# Patient Record
Sex: MALE | Race: WHITE
[De-identification: names, ages, dates, MRNs, and addresses within clinical notes are randomized per-mention and may not be internally consistent; named-entity substitution may affect disease eponyms.]

---

## 2020-01-31 ENCOUNTER — HOSPITAL ENCOUNTER (EMERGENCY)
Dept: HOSPITAL 8 - ED | Age: 30
Discharge: HOME | End: 2020-01-31
Payer: MEDICAID

## 2020-01-31 VITALS — HEIGHT: 68 IN | BODY MASS INDEX: 22.39 KG/M2 | WEIGHT: 147.71 LBS

## 2020-01-31 VITALS — DIASTOLIC BLOOD PRESSURE: 82 MMHG | SYSTOLIC BLOOD PRESSURE: 125 MMHG

## 2020-01-31 DIAGNOSIS — B34.9: Primary | ICD-10-CM

## 2020-01-31 PROCEDURE — 99283 EMERGENCY DEPT VISIT LOW MDM: CPT

## 2020-01-31 PROCEDURE — 71046 X-RAY EXAM CHEST 2 VIEWS: CPT

## 2020-01-31 NOTE — NUR
PATIENT ARRIVES WITH A COUGH THAT BEGAN ON SUNDAY.  HE STATES HE WORKS AT [employer] AND IT'S IMPEDING HIS WORK.

## 2020-01-31 NOTE — NUR
break RN: Patient given discharge instructions and they have confirmed that 
they understand the instructions.  Patient ambulatory with steady gait.

## 2024-05-23 ENCOUNTER — APPOINTMENT (OUTPATIENT)
Dept: RADIOLOGY | Facility: MEDICAL CENTER | Age: 34
DRG: 464 | End: 2024-05-23
Attending: EMERGENCY MEDICINE
Payer: OTHER MISCELLANEOUS

## 2024-05-23 ENCOUNTER — APPOINTMENT (OUTPATIENT)
Dept: RADIOLOGY | Facility: MEDICAL CENTER | Age: 34
DRG: 464 | End: 2024-05-23
Payer: OTHER MISCELLANEOUS

## 2024-05-23 ENCOUNTER — APPOINTMENT (OUTPATIENT)
Dept: RADIOLOGY | Facility: MEDICAL CENTER | Age: 34
DRG: 464 | End: 2024-05-23
Attending: ORTHOPAEDIC SURGERY
Payer: OTHER MISCELLANEOUS

## 2024-05-23 ENCOUNTER — HOSPITAL ENCOUNTER (INPATIENT)
Facility: MEDICAL CENTER | Age: 34
End: 2024-05-23
Attending: EMERGENCY MEDICINE | Admitting: SURGERY
Payer: OTHER MISCELLANEOUS

## 2024-05-23 DIAGNOSIS — S42.101A CLOSED FRACTURE OF RIGHT SCAPULA, UNSPECIFIED PART OF SCAPULA, INITIAL ENCOUNTER: ICD-10-CM

## 2024-05-23 DIAGNOSIS — F11.10 OPIOID ABUSE (HCC): ICD-10-CM

## 2024-05-23 DIAGNOSIS — S82.892A CLOSED FRACTURE OF LEFT ANKLE, INITIAL ENCOUNTER: ICD-10-CM

## 2024-05-23 DIAGNOSIS — S82.891A CLOSED FRACTURE OF RIGHT ANKLE, INITIAL ENCOUNTER: ICD-10-CM

## 2024-05-23 DIAGNOSIS — T14.8XXA MULTIPLE SKIN TEARS: ICD-10-CM

## 2024-05-23 DIAGNOSIS — S32.10XA CLOSED FRACTURE OF SACRUM, UNSPECIFIED PORTION OF SACRUM, INITIAL ENCOUNTER (HCC): ICD-10-CM
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
## 2024-05-23 DIAGNOSIS — T14.90XA TRAUMA: ICD-10-CM

## 2024-05-23 DIAGNOSIS — S62.309A MULTIPLE CLOSED FRACTURES OF SINGLE METACARPAL BONE, INITIAL ENCOUNTER: ICD-10-CM

## 2024-05-23 DIAGNOSIS — E87.6 HYPOKALEMIA: ICD-10-CM

## 2024-05-23 DIAGNOSIS — S92.902A CLOSED FRACTURE OF LEFT FOOT, INITIAL ENCOUNTER: ICD-10-CM

## 2024-05-23 DIAGNOSIS — S41.111A LACERATION OF RIGHT UPPER EXTREMITY, INITIAL ENCOUNTER: ICD-10-CM

## 2024-05-23 DIAGNOSIS — S81.812A LACERATION OF LEFT LOWER EXTREMITY, INITIAL ENCOUNTER: ICD-10-CM

## 2024-05-23 DIAGNOSIS — S22.32XA CLOSED FRACTURE OF ONE RIB OF LEFT SIDE, INITIAL ENCOUNTER: ICD-10-CM

## 2024-05-23 PROBLEM — S42.102A CLOSED FRACTURE OF LEFT SCAPULA: Status: ACTIVE | Noted: 2024-05-23

## 2024-05-23 PROBLEM — S92.901A: Status: ACTIVE | Noted: 2024-05-23

## 2024-05-23 PROBLEM — Z78.9 NO CONTRAINDICATION TO DEEP VEIN THROMBOSIS (DVT) PROPHYLAXIS: Status: ACTIVE | Noted: 2024-05-23

## 2024-05-23 PROBLEM — T07.XXXA MULTIPLE LACERATIONS: Status: ACTIVE | Noted: 2024-05-23

## 2024-05-23 PROBLEM — S22.060A COMPRESSION FRACTURE OF T8 VERTEBRA (HCC): Status: ACTIVE | Noted: 2024-05-23

## 2024-05-23 LAB
ABO + RH BLD: NORMAL
ABO + RH BLD: NORMAL
ABO GROUP BLD: NORMAL
ABO GROUP BLD: NORMAL
ALBUMIN SERPL BCP-MCNC: 4.3 G/DL (ref 3.2–4.9)
ALBUMIN SERPL BCP-MCNC: 4.3 G/DL (ref 3.2–4.9)
ALBUMIN/GLOB SERPL: 1.3 G/DL
ALBUMIN/GLOB SERPL: 1.3 G/DL
ALP SERPL-CCNC: 56 U/L (ref 30–99)
ALP SERPL-CCNC: 56 U/L (ref 30–99)
ALT SERPL-CCNC: 73 U/L (ref 2–50)
ALT SERPL-CCNC: 73 U/L (ref 2–50)
ANION GAP SERPL CALC-SCNC: 17 MMOL/L (ref 7–16)
ANION GAP SERPL CALC-SCNC: 17 MMOL/L (ref 7–16)
APTT PPP: 25.7 SEC (ref 24.7–36)
APTT PPP: 25.7 SEC (ref 24.7–36)
AST SERPL-CCNC: 84 U/L (ref 12–45)
AST SERPL-CCNC: 84 U/L (ref 12–45)
BILIRUB SERPL-MCNC: 0.5 MG/DL (ref 0.1–1.5)
BILIRUB SERPL-MCNC: 0.5 MG/DL (ref 0.1–1.5)
BLD GP AB SCN SERPL QL: NORMAL
BLD GP AB SCN SERPL QL: NORMAL
BUN SERPL-MCNC: 15 MG/DL (ref 8–22)
BUN SERPL-MCNC: 15 MG/DL (ref 8–22)
CALCIUM ALBUM COR SERPL-MCNC: 9 MG/DL (ref 8.5–10.5)
CALCIUM ALBUM COR SERPL-MCNC: 9 MG/DL (ref 8.5–10.5)
CALCIUM SERPL-MCNC: 9.2 MG/DL (ref 8.5–10.5)
CALCIUM SERPL-MCNC: 9.2 MG/DL (ref 8.5–10.5)
CHLORIDE SERPL-SCNC: 100 MMOL/L (ref 96–112)
CHLORIDE SERPL-SCNC: 100 MMOL/L (ref 96–112)
CO2 SERPL-SCNC: 20 MMOL/L (ref 20–33)
CO2 SERPL-SCNC: 20 MMOL/L (ref 20–33)
CREAT SERPL-MCNC: 1.08 MG/DL (ref 0.5–1.4)
CREAT SERPL-MCNC: 1.08 MG/DL (ref 0.5–1.4)
ERYTHROCYTE [DISTWIDTH] IN BLOOD BY AUTOMATED COUNT: 38.8 FL (ref 35.9–50)
ERYTHROCYTE [DISTWIDTH] IN BLOOD BY AUTOMATED COUNT: 38.8 FL (ref 35.9–50)
ETHANOL BLD-MCNC: <10.1 MG/DL
ETHANOL BLD-MCNC: <10.1 MG/DL
GFR SERPLBLD CREATININE-BSD FMLA CKD-EPI: 93 ML/MIN/1.73 M 2
GFR SERPLBLD CREATININE-BSD FMLA CKD-EPI: 93 ML/MIN/1.73 M 2
GLOBULIN SER CALC-MCNC: 3.4 G/DL (ref 1.9–3.5)
GLOBULIN SER CALC-MCNC: 3.4 G/DL (ref 1.9–3.5)
GLUCOSE BLD STRIP.AUTO-MCNC: 121 MG/DL (ref 65–99)
GLUCOSE BLD STRIP.AUTO-MCNC: 121 MG/DL (ref 65–99)
GLUCOSE SERPL-MCNC: 164 MG/DL (ref 65–99)
GLUCOSE SERPL-MCNC: 164 MG/DL (ref 65–99)
HCT VFR BLD AUTO: 48.4 % (ref 42–52)
HCT VFR BLD AUTO: 48.4 % (ref 42–52)
HGB BLD-MCNC: 16.5 G/DL (ref 14–18)
HGB BLD-MCNC: 16.5 G/DL (ref 14–18)
INR PPP: 0.98 (ref 0.87–1.13)
INR PPP: 0.98 (ref 0.87–1.13)
MCH RBC QN AUTO: 28.7 PG (ref 27–33)
MCH RBC QN AUTO: 28.7 PG (ref 27–33)
MCHC RBC AUTO-ENTMCNC: 34.1 G/DL (ref 32.3–36.5)
MCHC RBC AUTO-ENTMCNC: 34.1 G/DL (ref 32.3–36.5)
MCV RBC AUTO: 84.3 FL (ref 81.4–97.8)
MCV RBC AUTO: 84.3 FL (ref 81.4–97.8)
PLATELET # BLD AUTO: 344 K/UL (ref 164–446)
PLATELET # BLD AUTO: 344 K/UL (ref 164–446)
PMV BLD AUTO: 10 FL (ref 9–12.9)
PMV BLD AUTO: 10 FL (ref 9–12.9)
POTASSIUM SERPL-SCNC: 3 MMOL/L (ref 3.6–5.5)
POTASSIUM SERPL-SCNC: 3 MMOL/L (ref 3.6–5.5)
PROT SERPL-MCNC: 7.7 G/DL (ref 6–8.2)
PROT SERPL-MCNC: 7.7 G/DL (ref 6–8.2)
PROTHROMBIN TIME: 13.1 SEC (ref 12–14.6)
PROTHROMBIN TIME: 13.1 SEC (ref 12–14.6)
RBC # BLD AUTO: 5.74 M/UL (ref 4.7–6.1)
RBC # BLD AUTO: 5.74 M/UL (ref 4.7–6.1)
RH BLD: NORMAL
RH BLD: NORMAL
SODIUM SERPL-SCNC: 137 MMOL/L (ref 135–145)
SODIUM SERPL-SCNC: 137 MMOL/L (ref 135–145)
WBC # BLD AUTO: 14.9 K/UL (ref 4.8–10.8)
WBC # BLD AUTO: 14.9 K/UL (ref 4.8–10.8)

## 2024-05-23 PROCEDURE — 86850 RBC ANTIBODY SCREEN: CPT

## 2024-05-23 PROCEDURE — 99223 1ST HOSP IP/OBS HIGH 75: CPT | Performed by: SURGERY

## 2024-05-23 PROCEDURE — 0QSKXZZ REPOSITION LEFT FIBULA, EXTERNAL APPROACH: ICD-10-PCS | Performed by: EMERGENCY MEDICINE

## 2024-05-23 PROCEDURE — 73600 X-RAY EXAM OF ANKLE: CPT | Mod: LT

## 2024-05-23 PROCEDURE — 85027 COMPLETE CBC AUTOMATED: CPT

## 2024-05-23 PROCEDURE — 73620 X-RAY EXAM OF FOOT: CPT | Mod: LT

## 2024-05-23 PROCEDURE — 73700 CT LOWER EXTREMITY W/O DYE: CPT | Mod: LT

## 2024-05-23 PROCEDURE — 304217 HCHG IRRIGATION SYSTEM

## 2024-05-23 PROCEDURE — 700102 HCHG RX REV CODE 250 W/ 637 OVERRIDE(OP)

## 2024-05-23 PROCEDURE — 73130 X-RAY EXAM OF HAND: CPT | Mod: LT

## 2024-05-23 PROCEDURE — 85610 PROTHROMBIN TIME: CPT

## 2024-05-23 PROCEDURE — 73030 X-RAY EXAM OF SHOULDER: CPT | Mod: RT

## 2024-05-23 PROCEDURE — 770001 HCHG ROOM/CARE - MED/SURG/GYN PRIV*

## 2024-05-23 PROCEDURE — 302874 HCHG BANDAGE ACE 2 OR 3""

## 2024-05-23 PROCEDURE — 72128 CT CHEST SPINE W/O DYE: CPT

## 2024-05-23 PROCEDURE — 0QSHXZZ REPOSITION LEFT TIBIA, EXTERNAL APPROACH: ICD-10-PCS | Performed by: EMERGENCY MEDICINE

## 2024-05-23 PROCEDURE — 27810 TREATMENT OF ANKLE FRACTURE: CPT

## 2024-05-23 PROCEDURE — 73610 X-RAY EXAM OF ANKLE: CPT | Mod: RT

## 2024-05-23 PROCEDURE — 72125 CT NECK SPINE W/O DYE: CPT

## 2024-05-23 PROCEDURE — 29125 APPL SHORT ARM SPLINT STATIC: CPT

## 2024-05-23 PROCEDURE — 700111 HCHG RX REV CODE 636 W/ 250 OVERRIDE (IP): Mod: JZ | Performed by: PHYSICIAN ASSISTANT

## 2024-05-23 PROCEDURE — 73700 CT LOWER EXTREMITY W/O DYE: CPT | Mod: RT

## 2024-05-23 PROCEDURE — 70450 CT HEAD/BRAIN W/O DYE: CPT

## 2024-05-23 PROCEDURE — 305948 HCHG GREEN TRAUMA ACT PRE-NOTIFY NO CC

## 2024-05-23 PROCEDURE — 73590 X-RAY EXAM OF LOWER LEG: CPT | Mod: LT

## 2024-05-23 PROCEDURE — 73590 X-RAY EXAM OF LOWER LEG: CPT | Mod: RT

## 2024-05-23 PROCEDURE — 36415 COLL VENOUS BLD VENIPUNCTURE: CPT

## 2024-05-23 PROCEDURE — 82077 ASSAY SPEC XCP UR&BREATH IA: CPT

## 2024-05-23 PROCEDURE — 72131 CT LUMBAR SPINE W/O DYE: CPT

## 2024-05-23 PROCEDURE — 700105 HCHG RX REV CODE 258

## 2024-05-23 PROCEDURE — 71260 CT THORAX DX C+: CPT

## 2024-05-23 PROCEDURE — 96365 THER/PROPH/DIAG IV INF INIT: CPT

## 2024-05-23 PROCEDURE — 700105 HCHG RX REV CODE 258: Performed by: EMERGENCY MEDICINE

## 2024-05-23 PROCEDURE — 80053 COMPREHEN METABOLIC PANEL: CPT

## 2024-05-23 PROCEDURE — A9270 NON-COVERED ITEM OR SERVICE: HCPCS

## 2024-05-23 PROCEDURE — 90471 IMMUNIZATION ADMIN: CPT

## 2024-05-23 PROCEDURE — 90715 TDAP VACCINE 7 YRS/> IM: CPT | Performed by: EMERGENCY MEDICINE

## 2024-05-23 PROCEDURE — 72170 X-RAY EXAM OF PELVIS: CPT

## 2024-05-23 PROCEDURE — 700111 HCHG RX REV CODE 636 W/ 250 OVERRIDE (IP): Performed by: EMERGENCY MEDICINE

## 2024-05-23 PROCEDURE — 700101 HCHG RX REV CODE 250: Mod: UD | Performed by: EMERGENCY MEDICINE

## 2024-05-23 PROCEDURE — 304999 HCHG REPAIR-SIMPLE/INTERMED LEVEL 1

## 2024-05-23 PROCEDURE — 86900 BLOOD TYPING SEROLOGIC ABO: CPT

## 2024-05-23 PROCEDURE — 94799 UNLISTED PULMONARY SVC/PX: CPT

## 2024-05-23 PROCEDURE — 303747 HCHG EXTRA SUTURE

## 2024-05-23 PROCEDURE — 700101 HCHG RX REV CODE 250

## 2024-05-23 PROCEDURE — 700117 HCHG RX CONTRAST REV CODE 255: Performed by: EMERGENCY MEDICINE

## 2024-05-23 PROCEDURE — 3E0234Z INTRODUCTION OF SERUM, TOXOID AND VACCINE INTO MUSCLE, PERCUTANEOUS APPROACH: ICD-10-PCS | Performed by: EMERGENCY MEDICINE

## 2024-05-23 PROCEDURE — 86901 BLOOD TYPING SEROLOGIC RH(D): CPT

## 2024-05-23 PROCEDURE — 71045 X-RAY EXAM CHEST 1 VIEW: CPT

## 2024-05-23 PROCEDURE — 0HQDXZZ REPAIR RIGHT LOWER ARM SKIN, EXTERNAL APPROACH: ICD-10-PCS | Performed by: EMERGENCY MEDICINE

## 2024-05-23 PROCEDURE — 99285 EMERGENCY DEPT VISIT HI MDM: CPT

## 2024-05-23 PROCEDURE — 96375 TX/PRO/DX INJ NEW DRUG ADDON: CPT

## 2024-05-23 PROCEDURE — 82962 GLUCOSE BLOOD TEST: CPT

## 2024-05-23 PROCEDURE — 85730 THROMBOPLASTIN TIME PARTIAL: CPT

## 2024-05-23 RX ORDER — ACETAMINOPHEN 500 MG
1000 TABLET ORAL EVERY 6 HOURS PRN
Status: DISCONTINUED | OUTPATIENT
Start: 2024-05-28 | End: 2024-06-26 | Stop reason: HOSPADM

## 2024-05-23 RX ORDER — ONDANSETRON 2 MG/ML
4 INJECTION INTRAMUSCULAR; INTRAVENOUS EVERY 4 HOURS PRN
Status: DISCONTINUED | OUTPATIENT
Start: 2024-05-23 | End: 2024-06-19

## 2024-05-23 RX ORDER — METAXALONE 800 MG/1
800 TABLET ORAL 3 TIMES DAILY
Status: DISCONTINUED | OUTPATIENT
Start: 2024-05-23 | End: 2024-06-26 | Stop reason: HOSPADM

## 2024-05-23 RX ORDER — DOCUSATE SODIUM 100 MG/1
100 CAPSULE, LIQUID FILLED ORAL 2 TIMES DAILY
Status: DISCONTINUED | OUTPATIENT
Start: 2024-05-23 | End: 2024-06-26 | Stop reason: HOSPADM

## 2024-05-23 RX ORDER — BISACODYL 10 MG
10 SUPPOSITORY, RECTAL RECTAL
Status: DISCONTINUED | OUTPATIENT
Start: 2024-05-23 | End: 2024-06-26 | Stop reason: HOSPADM

## 2024-05-23 RX ORDER — DEXTROSE MONOHYDRATE 25 G/50ML
25 INJECTION, SOLUTION INTRAVENOUS
Status: DISCONTINUED | OUTPATIENT
Start: 2024-05-23 | End: 2024-05-24

## 2024-05-23 RX ORDER — AMOXICILLIN 250 MG
1 CAPSULE ORAL NIGHTLY
Status: DISCONTINUED | OUTPATIENT
Start: 2024-05-23 | End: 2024-06-26 | Stop reason: HOSPADM

## 2024-05-23 RX ORDER — LIDOCAINE 4 G/G
1 PATCH TOPICAL EVERY 24 HOURS
Status: DISCONTINUED | OUTPATIENT
Start: 2024-05-23 | End: 2024-05-26

## 2024-05-23 RX ORDER — BACITRACIN ZINC AND POLYMYXIN B SULFATE 500; 1000 [USP'U]/G; [USP'U]/G
OINTMENT TOPICAL 3 TIMES DAILY
Status: DISPENSED | OUTPATIENT
Start: 2024-05-23 | End: 2024-05-30

## 2024-05-23 RX ORDER — IBUPROFEN 200 MG
800-1000 TABLET ORAL
Status: ON HOLD | COMMUNITY
End: 2024-06-12

## 2024-05-23 RX ORDER — MIDAZOLAM HYDROCHLORIDE 1 MG/ML
INJECTION INTRAMUSCULAR; INTRAVENOUS
Status: COMPLETED | OUTPATIENT
Start: 2024-05-23 | End: 2024-05-23

## 2024-05-23 RX ORDER — ACETAMINOPHEN 500 MG
1000 TABLET ORAL EVERY 6 HOURS
Status: DISPENSED | OUTPATIENT
Start: 2024-05-23 | End: 2024-05-28

## 2024-05-23 RX ORDER — CELECOXIB 200 MG/1
200 CAPSULE ORAL 2 TIMES DAILY PRN
Status: DISCONTINUED | OUTPATIENT
Start: 2024-05-28 | End: 2024-05-31

## 2024-05-23 RX ORDER — AMOXICILLIN 250 MG
1 CAPSULE ORAL
Status: DISCONTINUED | OUTPATIENT
Start: 2024-05-23 | End: 2024-06-26 | Stop reason: HOSPADM

## 2024-05-23 RX ORDER — OXYCODONE HYDROCHLORIDE 5 MG/1
5 TABLET ORAL
Status: DISCONTINUED | OUTPATIENT
Start: 2024-05-23 | End: 2024-05-26

## 2024-05-23 RX ORDER — OXYCODONE HYDROCHLORIDE 10 MG/1
10 TABLET ORAL
Status: DISCONTINUED | OUTPATIENT
Start: 2024-05-23 | End: 2024-05-26

## 2024-05-23 RX ORDER — HYDROMORPHONE HYDROCHLORIDE 1 MG/ML
0.5 INJECTION, SOLUTION INTRAMUSCULAR; INTRAVENOUS; SUBCUTANEOUS ONCE
Status: COMPLETED | OUTPATIENT
Start: 2024-05-23 | End: 2024-05-23

## 2024-05-23 RX ORDER — FAMOTIDINE 20 MG/1
20 TABLET, FILM COATED ORAL 2 TIMES DAILY
Status: DISCONTINUED | OUTPATIENT
Start: 2024-05-23 | End: 2024-05-24

## 2024-05-23 RX ORDER — ONDANSETRON 4 MG/1
4 TABLET, ORALLY DISINTEGRATING ORAL EVERY 4 HOURS PRN
Status: DISCONTINUED | OUTPATIENT
Start: 2024-05-23 | End: 2024-06-26 | Stop reason: HOSPADM

## 2024-05-23 RX ORDER — POTASSIUM CHLORIDE 7.45 MG/ML
10 INJECTION INTRAVENOUS ONCE
Status: COMPLETED | OUTPATIENT
Start: 2024-05-23 | End: 2024-05-23

## 2024-05-23 RX ORDER — ENOXAPARIN SODIUM 100 MG/ML
40 INJECTION SUBCUTANEOUS EVERY 12 HOURS
Status: DISCONTINUED | OUTPATIENT
Start: 2024-05-23 | End: 2024-06-26 | Stop reason: HOSPADM

## 2024-05-23 RX ORDER — SODIUM CHLORIDE, SODIUM LACTATE, POTASSIUM CHLORIDE, CALCIUM CHLORIDE 600; 310; 30; 20 MG/100ML; MG/100ML; MG/100ML; MG/100ML
1000 INJECTION, SOLUTION INTRAVENOUS ONCE
Status: COMPLETED | OUTPATIENT
Start: 2024-05-23 | End: 2024-05-23

## 2024-05-23 RX ORDER — CELECOXIB 200 MG/1
200 CAPSULE ORAL 2 TIMES DAILY
Status: COMPLETED | OUTPATIENT
Start: 2024-05-23 | End: 2024-05-28

## 2024-05-23 RX ORDER — SODIUM CHLORIDE, SODIUM LACTATE, POTASSIUM CHLORIDE, CALCIUM CHLORIDE 600; 310; 30; 20 MG/100ML; MG/100ML; MG/100ML; MG/100ML
INJECTION, SOLUTION INTRAVENOUS CONTINUOUS
Status: DISCONTINUED | OUTPATIENT
Start: 2024-05-23 | End: 2024-05-28

## 2024-05-23 RX ORDER — PROPOFOL 10 MG/ML
INJECTION, EMULSION INTRAVENOUS
Status: COMPLETED | OUTPATIENT
Start: 2024-05-23 | End: 2024-05-23

## 2024-05-23 RX ORDER — POLYETHYLENE GLYCOL 3350 17 G/17G
1 POWDER, FOR SOLUTION ORAL 2 TIMES DAILY
Status: DISCONTINUED | OUTPATIENT
Start: 2024-05-23 | End: 2024-06-26 | Stop reason: HOSPADM

## 2024-05-23 RX ORDER — GABAPENTIN 100 MG/1
100 CAPSULE ORAL EVERY 8 HOURS
Status: DISCONTINUED | OUTPATIENT
Start: 2024-05-23 | End: 2024-05-26

## 2024-05-23 RX ORDER — HYDROMORPHONE HYDROCHLORIDE 1 MG/ML
0.5 INJECTION, SOLUTION INTRAMUSCULAR; INTRAVENOUS; SUBCUTANEOUS
Status: DISCONTINUED | OUTPATIENT
Start: 2024-05-23 | End: 2024-05-26

## 2024-05-23 RX ADMIN — HYDROMORPHONE HYDROCHLORIDE 0.5 MG: 1 INJECTION, SOLUTION INTRAMUSCULAR; INTRAVENOUS; SUBCUTANEOUS at 14:11

## 2024-05-23 RX ADMIN — PROPOFOL 60 MG: 10 INJECTION, EMULSION INTRAVENOUS at 11:19

## 2024-05-23 RX ADMIN — SODIUM CHLORIDE, POTASSIUM CHLORIDE, SODIUM LACTATE AND CALCIUM CHLORIDE 1000 ML: 600; 310; 30; 20 INJECTION, SOLUTION INTRAVENOUS at 14:12

## 2024-05-23 RX ADMIN — GABAPENTIN 100 MG: 100 CAPSULE ORAL at 21:23

## 2024-05-23 RX ADMIN — GABAPENTIN 100 MG: 100 CAPSULE ORAL at 17:36

## 2024-05-23 RX ADMIN — ACETAMINOPHEN 1000 MG: 500 TABLET, FILM COATED ORAL at 23:30

## 2024-05-23 RX ADMIN — CELECOXIB 200 MG: 200 CAPSULE ORAL at 17:36

## 2024-05-23 RX ADMIN — SODIUM CHLORIDE, POTASSIUM CHLORIDE, SODIUM LACTATE AND CALCIUM CHLORIDE: 600; 310; 30; 20 INJECTION, SOLUTION INTRAVENOUS at 15:29

## 2024-05-23 RX ADMIN — PROPOFOL 20 MG: 10 INJECTION, EMULSION INTRAVENOUS at 11:20

## 2024-05-23 RX ADMIN — IOHEXOL 100 ML: 350 INJECTION, SOLUTION INTRAVENOUS at 12:30

## 2024-05-23 RX ADMIN — SENNOSIDES AND DOCUSATE SODIUM 1 TABLET: 50; 8.6 TABLET ORAL at 21:23

## 2024-05-23 RX ADMIN — Medication 1 EACH: at 17:36

## 2024-05-23 RX ADMIN — LIDOCAINE HYDROCHLORIDE 20 ML: 10; .005 INJECTION, SOLUTION EPIDURAL; INFILTRATION; INTRACAUDAL; PERINEURAL at 13:00

## 2024-05-23 RX ADMIN — POTASSIUM CHLORIDE 10 MEQ: 7.46 INJECTION, SOLUTION INTRAVENOUS at 14:28

## 2024-05-23 RX ADMIN — LIDOCAINE 1 PATCH: 4 PATCH TOPICAL at 17:36

## 2024-05-23 RX ADMIN — FAMOTIDINE 20 MG: 20 TABLET, FILM COATED ORAL at 17:36

## 2024-05-23 RX ADMIN — ENOXAPARIN SODIUM 40 MG: 100 INJECTION SUBCUTANEOUS at 17:36

## 2024-05-23 RX ADMIN — CLOSTRIDIUM TETANI TOXOID ANTIGEN (FORMALDEHYDE INACTIVATED), CORYNEBACTERIUM DIPHTHERIAE TOXOID ANTIGEN (FORMALDEHYDE INACTIVATED), BORDETELLA PERTUSSIS TOXOID ANTIGEN (GLUTARALDEHYDE INACTIVATED), BORDETELLA PERTUSSIS FILAMENTOUS HEMAGGLUTININ ANTIGEN (FORMALDEHYDE INACTIVATED), BORDETELLA PERTUSSIS PERTACTIN ANTIGEN, AND BORDETELLA PERTUSSIS FIMBRIAE 2/3 ANTIGEN 0.5 ML: 5; 2; 2.5; 5; 3; 5 INJECTION, SUSPENSION INTRAMUSCULAR at 12:47

## 2024-05-23 RX ADMIN — ACETAMINOPHEN 1000 MG: 500 TABLET, FILM COATED ORAL at 17:36

## 2024-05-23 RX ADMIN — METAXALONE 800 MG: 800 TABLET ORAL at 17:36

## 2024-05-23 RX ADMIN — DOCUSATE SODIUM 100 MG: 100 CAPSULE, LIQUID FILLED ORAL at 17:36

## 2024-05-23 RX ADMIN — MIDAZOLAM HYDROCHLORIDE 2 MG: 1 INJECTION, SOLUTION INTRAMUSCULAR; INTRAVENOUS at 11:42

## 2024-05-23 RX ADMIN — OXYCODONE HYDROCHLORIDE 10 MG: 10 TABLET ORAL at 21:22

## 2024-05-23 RX ADMIN — PROPOFOL 40 MG: 10 INJECTION, EMULSION INTRAVENOUS at 11:22

## 2024-05-23 SDOH — ECONOMIC STABILITY: TRANSPORTATION INSECURITY
IN THE PAST 12 MONTHS, HAS LACK OF RELIABLE TRANSPORTATION KEPT YOU FROM MEDICAL APPOINTMENTS, MEETINGS, WORK OR FROM GETTING THINGS NEEDED FOR DAILY LIVING?: NO

## 2024-05-23 SDOH — ECONOMIC STABILITY: TRANSPORTATION INSECURITY
IN THE PAST 12 MONTHS, HAS THE LACK OF TRANSPORTATION KEPT YOU FROM MEDICAL APPOINTMENTS OR FROM GETTING MEDICATIONS?: NO

## 2024-05-23 ASSESSMENT — SOCIAL DETERMINANTS OF HEALTH (SDOH)
WITHIN THE PAST 12 MONTHS, THE FOOD YOU BOUGHT JUST DIDN'T LAST AND YOU DIDN'T HAVE MONEY TO GET MORE: NEVER TRUE
WITHIN THE LAST YEAR, HAVE TO BEEN RAPED OR FORCED TO HAVE ANY KIND OF SEXUAL ACTIVITY BY YOUR PARTNER OR EX-PARTNER?: NO
IN THE PAST 12 MONTHS, HAS THE ELECTRIC, GAS, OIL, OR WATER COMPANY THREATENED TO SHUT OFF SERVICE IN YOUR HOME?: NO
WITHIN THE PAST 12 MONTHS, YOU WORRIED THAT YOUR FOOD WOULD RUN OUT BEFORE YOU GOT THE MONEY TO BUY MORE: NEVER TRUE
WITHIN THE LAST YEAR, HAVE YOU BEEN HUMILIATED OR EMOTIONALLY ABUSED IN OTHER WAYS BY YOUR PARTNER OR EX-PARTNER?: NO
WITHIN THE LAST YEAR, HAVE YOU BEEN AFRAID OF YOUR PARTNER OR EX-PARTNER?: NO
WITHIN THE LAST YEAR, HAVE YOU BEEN KICKED, HIT, SLAPPED, OR OTHERWISE PHYSICALLY HURT BY YOUR PARTNER OR EX-PARTNER?: NO

## 2024-05-23 ASSESSMENT — LIFESTYLE VARIABLES
EVER FELT BAD OR GUILTY ABOUT YOUR DRINKING: NO
TOTAL SCORE: 0
ALCOHOL_USE: NO
REASON UNABLE TO ASSESS: DOES NOT DRINK
DOES PATIENT WANT TO STOP DRINKING: CANNOT ASSESS
HOW MANY TIMES IN THE PAST YEAR HAVE YOU HAD 5 OR MORE DRINKS IN A DAY: 0
EVER HAD A DRINK FIRST THING IN THE MORNING TO STEADY YOUR NERVES TO GET RID OF A HANGOVER: NO
TOTAL SCORE: 0
ON A TYPICAL DAY WHEN YOU DRINK ALCOHOL HOW MANY DRINKS DO YOU HAVE: 0
AVERAGE NUMBER OF DAYS PER WEEK YOU HAVE A DRINK CONTAINING ALCOHOL: 0
HAVE PEOPLE ANNOYED YOU BY CRITICIZING YOUR DRINKING: NO
CONSUMPTION TOTAL: NEGATIVE
HAVE YOU EVER FELT YOU SHOULD CUT DOWN ON YOUR DRINKING: NO
TOTAL SCORE: 0

## 2024-05-23 ASSESSMENT — COPD QUESTIONNAIRES
DURING THE PAST 4 WEEKS HOW MUCH DID YOU FEEL SHORT OF BREATH: NONE/LITTLE OF THE TIME
HAVE YOU SMOKED AT LEAST 100 CIGARETTES IN YOUR ENTIRE LIFE: YES
COPD SCREENING SCORE: 2
DO YOU EVER COUGH UP ANY MUCUS OR PHLEGM?: NO/ONLY WITH OCCASIONAL COLDS OR INFECTIONS

## 2024-05-23 ASSESSMENT — PATIENT HEALTH QUESTIONNAIRE - PHQ9
2. FEELING DOWN, DEPRESSED, IRRITABLE, OR HOPELESS: NOT AT ALL
SUM OF ALL RESPONSES TO PHQ9 QUESTIONS 1 AND 2: 0
1. LITTLE INTEREST OR PLEASURE IN DOING THINGS: NOT AT ALL

## 2024-05-23 ASSESSMENT — PAIN DESCRIPTION - PAIN TYPE: TYPE: ACUTE PAIN

## 2024-05-23 NOTE — ASSESSMENT & PLAN NOTE
Left forearm laceration repaired in ED with sutures.   Remove in approximately 10 days. (~ 6/3)  Left shin laceration under splint will likely need repair in OR during orthopedic fixation.

## 2024-05-23 NOTE — ED NOTES
Splint placed to LLE, pt rolled. R arm lac wrapped with melissa. Pt by jeana to CT on [place] with RN & tech.
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
## 2024-05-23 NOTE — ED NOTES
Med rec completed per patient, with family (sister) at bedside.    Allergies reviewed with patient. NKDA.    Patient denies using any prescription medications at this time.    Outpatient antibiotics within the last 30 days: NONE.    ANTICOAGULATION: NONE.    Patient's preferred pharmacy: none specified.

## 2024-05-23 NOTE — ED NOTES
Splint applied to injured extremity. CMS assessed before and after splint application. Patient educated on use and care of splint. Patient verbalized understanding.
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
## 2024-05-23 NOTE — ASSESSMENT & PLAN NOTE
Nondisplaced fracture of the posterior aspect of left 11th rib.  Aggressive pulmonary hygiene and multimodal pain management.

## 2024-05-23 NOTE — PROGRESS NOTES
1600  Pt arrived to unit via guryanira, slide to bed. A&O x 4, pain 8/10, VSS on RA, dressing to LUE and LLE in place, with all belongings at bedside. Pt oriented to unit, call light and belongings within reach, educated to call for assistance.
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
## 2024-05-23 NOTE — DISCHARGE PLANNING
Trauma Response    Referral: Trauma green Response    Intervention: SW responded to trauma ***.  Pt was BIB *** after ***.  Pt was *** upon arrival.  Pts name is *** (: ***).  SW obtained the following pt information: ***.  SW was *** to contact pts ***.  ***    Plan: ***

## 2024-05-23 NOTE — ASSESSMENT & PLAN NOTE
Displaced fracture of the scapular body inferior to the inferior glenoid.   Weight bearing status - Weightbearing as tolerated ALE.  J Carlos Pollard MD. Orthopedic Surgeon. [facility].

## 2024-05-23 NOTE — ASSESSMENT & PLAN NOTE
Mild anterior compression deformity of T8 which is of indeterminate age. Please correlate clinically for level of pain. This could be further assessed with MRI.   Non tender on exam.

## 2024-05-23 NOTE — ASSESSMENT & PLAN NOTE
Displaced comminuted fracture of the metadiaphysis of the right first metatarsal bone with intra-articular extension.  Comminuted fractures of the heads of the left second through fourth metatarsal bones.  Will require staged surgical fixation of his multiple left foot - TBD  -Waiting on soft tissue resolution   Weight bearing status - Nonweightbearing LLE.  J Carlos Pollard MD. Orthopedic Surgeon. [facility].

## 2024-05-23 NOTE — ASSESSMENT & PLAN NOTE
Bimalleolar left ankle fractures.   5/25 OR for Open treatment and internal fixation of left bimalleolar ankle fracture. Open treatment and internal fixation of left distal tibiofibular joint disruption.  Weight bearing status - Nonweightbearing LLE.  J Carlos Pollard MD. Orthopedic Surgeon. [facility].

## 2024-05-23 NOTE — RESPIRATORY CARE
Conscious Sedation Respiratory Update       O2 (LPM): 2 (05/23/24 1120)       Events/Summary/Plan: Sedation for Left Ankle Reduction (05/23/24 1120)

## 2024-05-24 ENCOUNTER — APPOINTMENT (OUTPATIENT)
Dept: RADIOLOGY | Facility: MEDICAL CENTER | Age: 34
DRG: 464 | End: 2024-05-24
Attending: ORTHOPAEDIC SURGERY
Payer: OTHER MISCELLANEOUS

## 2024-05-24 ENCOUNTER — APPOINTMENT (OUTPATIENT)
Dept: RADIOLOGY | Facility: MEDICAL CENTER | Age: 34
End: 2024-05-24
Payer: OTHER MISCELLANEOUS

## 2024-05-24 ENCOUNTER — ANESTHESIA EVENT (OUTPATIENT)
Dept: SURGERY | Facility: MEDICAL CENTER | Age: 34
End: 2024-05-24
Payer: OTHER MISCELLANEOUS

## 2024-05-24 ENCOUNTER — ANESTHESIA (OUTPATIENT)
Dept: SURGERY | Facility: MEDICAL CENTER | Age: 34
End: 2024-05-24
Payer: OTHER MISCELLANEOUS

## 2024-05-24 ENCOUNTER — HOSPITAL ENCOUNTER (OUTPATIENT)
Dept: RADIOLOGY | Facility: MEDICAL CENTER | Age: 34
End: 2024-05-24
Attending: ORTHOPAEDIC SURGERY
Payer: COMMERCIAL

## 2024-05-24 LAB
ANION GAP SERPL CALC-SCNC: 10 MMOL/L (ref 7–16)
ANION GAP SERPL CALC-SCNC: 10 MMOL/L (ref 7–16)
BASOPHILS # BLD AUTO: 0.2 % (ref 0–1.8)
BASOPHILS # BLD AUTO: 0.2 % (ref 0–1.8)
BASOPHILS # BLD: 0.01 K/UL (ref 0–0.12)
BASOPHILS # BLD: 0.01 K/UL (ref 0–0.12)
BUN SERPL-MCNC: 17 MG/DL (ref 8–22)
BUN SERPL-MCNC: 17 MG/DL (ref 8–22)
CALCIUM SERPL-MCNC: 8.3 MG/DL (ref 8.5–10.5)
CALCIUM SERPL-MCNC: 8.3 MG/DL (ref 8.5–10.5)
CHLORIDE SERPL-SCNC: 102 MMOL/L (ref 96–112)
CHLORIDE SERPL-SCNC: 102 MMOL/L (ref 96–112)
CO2 SERPL-SCNC: 22 MMOL/L (ref 20–33)
CO2 SERPL-SCNC: 22 MMOL/L (ref 20–33)
CREAT SERPL-MCNC: 0.94 MG/DL (ref 0.5–1.4)
CREAT SERPL-MCNC: 0.94 MG/DL (ref 0.5–1.4)
EOSINOPHIL # BLD AUTO: 0.05 K/UL (ref 0–0.51)
EOSINOPHIL # BLD AUTO: 0.05 K/UL (ref 0–0.51)
EOSINOPHIL NFR BLD: 0.9 % (ref 0–6.9)
EOSINOPHIL NFR BLD: 0.9 % (ref 0–6.9)
ERYTHROCYTE [DISTWIDTH] IN BLOOD BY AUTOMATED COUNT: 38.6 FL (ref 35.9–50)
ERYTHROCYTE [DISTWIDTH] IN BLOOD BY AUTOMATED COUNT: 38.6 FL (ref 35.9–50)
GFR SERPLBLD CREATININE-BSD FMLA CKD-EPI: 110 ML/MIN/1.73 M 2
GFR SERPLBLD CREATININE-BSD FMLA CKD-EPI: 110 ML/MIN/1.73 M 2
GLUCOSE BLD STRIP.AUTO-MCNC: 100 MG/DL (ref 65–99)
GLUCOSE BLD STRIP.AUTO-MCNC: 100 MG/DL (ref 65–99)
GLUCOSE BLD STRIP.AUTO-MCNC: 128 MG/DL (ref 65–99)
GLUCOSE BLD STRIP.AUTO-MCNC: 128 MG/DL (ref 65–99)
GLUCOSE BLD STRIP.AUTO-MCNC: 97 MG/DL (ref 65–99)
GLUCOSE BLD STRIP.AUTO-MCNC: 97 MG/DL (ref 65–99)
GLUCOSE SERPL-MCNC: 109 MG/DL (ref 65–99)
GLUCOSE SERPL-MCNC: 109 MG/DL (ref 65–99)
HCT VFR BLD AUTO: 34.3 % (ref 42–52)
HCT VFR BLD AUTO: 34.3 % (ref 42–52)
HGB BLD-MCNC: 12.2 G/DL (ref 14–18)
HGB BLD-MCNC: 12.2 G/DL (ref 14–18)
IMM GRANULOCYTES # BLD AUTO: 0.01 K/UL (ref 0–0.11)
IMM GRANULOCYTES # BLD AUTO: 0.01 K/UL (ref 0–0.11)
IMM GRANULOCYTES NFR BLD AUTO: 0.2 % (ref 0–0.9)
IMM GRANULOCYTES NFR BLD AUTO: 0.2 % (ref 0–0.9)
LYMPHOCYTES # BLD AUTO: 1.19 K/UL (ref 1–4.8)
LYMPHOCYTES # BLD AUTO: 1.19 K/UL (ref 1–4.8)
LYMPHOCYTES NFR BLD: 21.6 % (ref 22–41)
LYMPHOCYTES NFR BLD: 21.6 % (ref 22–41)
MCH RBC QN AUTO: 29.3 PG (ref 27–33)
MCH RBC QN AUTO: 29.3 PG (ref 27–33)
MCHC RBC AUTO-ENTMCNC: 35.6 G/DL (ref 32.3–36.5)
MCHC RBC AUTO-ENTMCNC: 35.6 G/DL (ref 32.3–36.5)
MCV RBC AUTO: 82.3 FL (ref 81.4–97.8)
MCV RBC AUTO: 82.3 FL (ref 81.4–97.8)
MONOCYTES # BLD AUTO: 0.46 K/UL (ref 0–0.85)
MONOCYTES # BLD AUTO: 0.46 K/UL (ref 0–0.85)
MONOCYTES NFR BLD AUTO: 8.3 % (ref 0–13.4)
MONOCYTES NFR BLD AUTO: 8.3 % (ref 0–13.4)
NEUTROPHILS # BLD AUTO: 3.8 K/UL (ref 1.82–7.42)
NEUTROPHILS # BLD AUTO: 3.8 K/UL (ref 1.82–7.42)
NEUTROPHILS NFR BLD: 68.8 % (ref 44–72)
NEUTROPHILS NFR BLD: 68.8 % (ref 44–72)
NRBC # BLD AUTO: 0 K/UL
NRBC # BLD AUTO: 0 K/UL
NRBC BLD-RTO: 0 /100 WBC (ref 0–0.2)
NRBC BLD-RTO: 0 /100 WBC (ref 0–0.2)
PLATELET # BLD AUTO: 156 K/UL (ref 164–446)
PLATELET # BLD AUTO: 156 K/UL (ref 164–446)
PMV BLD AUTO: 9.8 FL (ref 9–12.9)
PMV BLD AUTO: 9.8 FL (ref 9–12.9)
POTASSIUM SERPL-SCNC: 4.2 MMOL/L (ref 3.6–5.5)
POTASSIUM SERPL-SCNC: 4.2 MMOL/L (ref 3.6–5.5)
RBC # BLD AUTO: 4.17 M/UL (ref 4.7–6.1)
RBC # BLD AUTO: 4.17 M/UL (ref 4.7–6.1)
SODIUM SERPL-SCNC: 134 MMOL/L (ref 135–145)
SODIUM SERPL-SCNC: 134 MMOL/L (ref 135–145)
WBC # BLD AUTO: 5.5 K/UL (ref 4.8–10.8)
WBC # BLD AUTO: 5.5 K/UL (ref 4.8–10.8)

## 2024-05-24 PROCEDURE — 73600 X-RAY EXAM OF ANKLE: CPT | Mod: LT

## 2024-05-24 PROCEDURE — C1713 ANCHOR/SCREW BN/BN,TIS/BN: HCPCS | Performed by: ORTHOPAEDIC SURGERY

## 2024-05-24 PROCEDURE — 71045 X-RAY EXAM CHEST 1 VIEW: CPT

## 2024-05-24 PROCEDURE — 0QSH04Z REPOSITION LEFT TIBIA WITH INTERNAL FIXATION DEVICE, OPEN APPROACH: ICD-10-PCS | Performed by: ORTHOPAEDIC SURGERY

## 2024-05-24 PROCEDURE — 700111 HCHG RX REV CODE 636 W/ 250 OVERRIDE (IP): Performed by: ANESTHESIOLOGY

## 2024-05-24 PROCEDURE — 0JBP0ZZ EXCISION OF LEFT LOWER LEG SUBCUTANEOUS TISSUE AND FASCIA, OPEN APPROACH: ICD-10-PCS | Performed by: ORTHOPAEDIC SURGERY

## 2024-05-24 PROCEDURE — 99232 SBSQ HOSP IP/OBS MODERATE 35: CPT | Performed by: NURSE PRACTITIONER

## 2024-05-24 PROCEDURE — 160035 HCHG PACU - 1ST 60 MINS PHASE I: Performed by: ORTHOPAEDIC SURGERY

## 2024-05-24 PROCEDURE — 160002 HCHG RECOVERY MINUTES (STAT): Performed by: ORTHOPAEDIC SURGERY

## 2024-05-24 PROCEDURE — 36415 COLL VENOUS BLD VENIPUNCTURE: CPT

## 2024-05-24 PROCEDURE — 700111 HCHG RX REV CODE 636 W/ 250 OVERRIDE (IP): Mod: JZ | Performed by: PHYSICIAN ASSISTANT

## 2024-05-24 PROCEDURE — 0QSK04Z REPOSITION LEFT FIBULA WITH INTERNAL FIXATION DEVICE, OPEN APPROACH: ICD-10-PCS | Performed by: ORTHOPAEDIC SURGERY

## 2024-05-24 PROCEDURE — 160029 HCHG SURGERY MINUTES - 1ST 30 MINS LEVEL 4: Performed by: ORTHOPAEDIC SURGERY

## 2024-05-24 PROCEDURE — 160036 HCHG PACU - EA ADDL 30 MINS PHASE I: Performed by: ORTHOPAEDIC SURGERY

## 2024-05-24 PROCEDURE — 80048 BASIC METABOLIC PNL TOTAL CA: CPT

## 2024-05-24 PROCEDURE — 700101 HCHG RX REV CODE 250

## 2024-05-24 PROCEDURE — 700111 HCHG RX REV CODE 636 W/ 250 OVERRIDE (IP): Performed by: ORTHOPAEDIC SURGERY

## 2024-05-24 PROCEDURE — 700111 HCHG RX REV CODE 636 W/ 250 OVERRIDE (IP)

## 2024-05-24 PROCEDURE — 160009 HCHG ANES TIME/MIN: Performed by: ORTHOPAEDIC SURGERY

## 2024-05-24 PROCEDURE — 160048 HCHG OR STATISTICAL LEVEL 1-5: Performed by: ORTHOPAEDIC SURGERY

## 2024-05-24 PROCEDURE — 700105 HCHG RX REV CODE 258: Performed by: ORTHOPAEDIC SURGERY

## 2024-05-24 PROCEDURE — 502240 HCHG MISC OR SUPPLY RC 0272: Performed by: ORTHOPAEDIC SURGERY

## 2024-05-24 PROCEDURE — 85025 COMPLETE CBC W/AUTO DIFF WBC: CPT

## 2024-05-24 PROCEDURE — A9270 NON-COVERED ITEM OR SERVICE: HCPCS

## 2024-05-24 PROCEDURE — 94669 MECHANICAL CHEST WALL OSCILL: CPT

## 2024-05-24 PROCEDURE — 770001 HCHG ROOM/CARE - MED/SURG/GYN PRIV*

## 2024-05-24 PROCEDURE — 82962 GLUCOSE BLOOD TEST: CPT | Mod: 91

## 2024-05-24 PROCEDURE — 3E0T3BZ INTRODUCTION OF ANESTHETIC AGENT INTO PERIPHERAL NERVES AND PLEXI, PERCUTANEOUS APPROACH: ICD-10-PCS | Performed by: ANESTHESIOLOGY

## 2024-05-24 PROCEDURE — 160041 HCHG SURGERY MINUTES - EA ADDL 1 MIN LEVEL 4: Performed by: ORTHOPAEDIC SURGERY

## 2024-05-24 PROCEDURE — 700102 HCHG RX REV CODE 250 W/ 637 OVERRIDE(OP)

## 2024-05-24 PROCEDURE — 700105 HCHG RX REV CODE 258

## 2024-05-24 PROCEDURE — A9270 NON-COVERED ITEM OR SERVICE: HCPCS | Performed by: ANESTHESIOLOGY

## 2024-05-24 PROCEDURE — 51798 US URINE CAPACITY MEASURE: CPT

## 2024-05-24 PROCEDURE — 700102 HCHG RX REV CODE 250 W/ 637 OVERRIDE(OP): Performed by: ANESTHESIOLOGY

## 2024-05-24 PROCEDURE — 64445 NJX AA&/STRD SCIATIC NRV IMG: CPT | Performed by: ORTHOPAEDIC SURGERY

## 2024-05-24 PROCEDURE — 64447 NJX AA&/STRD FEMORAL NRV IMG: CPT | Performed by: ORTHOPAEDIC SURGERY

## 2024-05-24 DEVICE — SCREW BONE VARIAX T10 FULL THREAD L14 MM OD3.5 MM FOOT ANKLE LOCK STARDRIVE NONSTERILE: Type: IMPLANTABLE DEVICE | Site: FIBULA | Status: FUNCTIONAL

## 2024-05-24 DEVICE — PLATE BONE 1/3 TUBULAR L83 MM FOOT ANKLE 7 HOLE NONSTERILE: Type: IMPLANTABLE DEVICE | Site: FIBULA | Status: FUNCTIONAL

## 2024-05-24 DEVICE — SCREW BONE ASNIS III TITANIUM PARTIAL THREAD REVERSE CUT FLUTE 50 MM 4 MM CANNULATED HEAD: Type: IMPLANTABLE DEVICE | Site: FIBULA | Status: FUNCTIONAL

## 2024-05-24 DEVICE — IMPLANTABLE DEVICE: Type: IMPLANTABLE DEVICE | Site: FIBULA | Status: FUNCTIONAL

## 2024-05-24 DEVICE — SCREW BONE VARIAX T10 FULL THREAD L14 MM OD3.5 MM FOOT ANKLE STARDRIVE NONSTERILE: Type: IMPLANTABLE DEVICE | Site: FIBULA | Status: FUNCTIONAL

## 2024-05-24 DEVICE — SCREW BONE VARIAX T10 FULL THREAD L18 MM OD3.5 MM FOOT ANKLE LOCK STARDRIVE NONSTERILE: Type: IMPLANTABLE DEVICE | Site: FIBULA | Status: FUNCTIONAL

## 2024-05-24 DEVICE — SCREW BONE VARIAX T10 FULL THREAD L16 MM OD3.5 MM FOOT ANKLE LOCK STARDRIVE NONSTERILE: Type: IMPLANTABLE DEVICE | Site: FIBULA | Status: FUNCTIONAL

## 2024-05-24 RX ORDER — ALBUTEROL SULFATE 5 MG/ML
2.5 SOLUTION RESPIRATORY (INHALATION)
Status: DISCONTINUED | OUTPATIENT
Start: 2024-05-24 | End: 2024-05-24 | Stop reason: HOSPADM

## 2024-05-24 RX ORDER — OXYCODONE HCL 5 MG/5 ML
10 SOLUTION, ORAL ORAL
Status: COMPLETED | OUTPATIENT
Start: 2024-05-24 | End: 2024-05-24

## 2024-05-24 RX ORDER — BUPIVACAINE HYDROCHLORIDE 2.5 MG/ML
INJECTION, SOLUTION EPIDURAL; INFILTRATION; INTRACAUDAL
Status: COMPLETED | OUTPATIENT
Start: 2024-05-24 | End: 2024-05-24

## 2024-05-24 RX ORDER — CEFAZOLIN SODIUM 1 G/3ML
INJECTION, POWDER, FOR SOLUTION INTRAMUSCULAR; INTRAVENOUS PRN
Status: DISCONTINUED | OUTPATIENT
Start: 2024-05-24 | End: 2024-05-24 | Stop reason: SURG

## 2024-05-24 RX ORDER — HYDROMORPHONE HYDROCHLORIDE 1 MG/ML
0.4 INJECTION, SOLUTION INTRAMUSCULAR; INTRAVENOUS; SUBCUTANEOUS
Status: DISCONTINUED | OUTPATIENT
Start: 2024-05-24 | End: 2024-05-24 | Stop reason: HOSPADM

## 2024-05-24 RX ORDER — DIPHENHYDRAMINE HYDROCHLORIDE 50 MG/ML
12.5 INJECTION INTRAMUSCULAR; INTRAVENOUS
Status: DISCONTINUED | OUTPATIENT
Start: 2024-05-24 | End: 2024-05-24 | Stop reason: HOSPADM

## 2024-05-24 RX ORDER — ONDANSETRON 2 MG/ML
4 INJECTION INTRAMUSCULAR; INTRAVENOUS
Status: DISCONTINUED | OUTPATIENT
Start: 2024-05-24 | End: 2024-05-24 | Stop reason: HOSPADM

## 2024-05-24 RX ORDER — HYDROMORPHONE HYDROCHLORIDE 1 MG/ML
0.1 INJECTION, SOLUTION INTRAMUSCULAR; INTRAVENOUS; SUBCUTANEOUS
Status: DISCONTINUED | OUTPATIENT
Start: 2024-05-24 | End: 2024-05-24 | Stop reason: HOSPADM

## 2024-05-24 RX ORDER — HYDROMORPHONE HYDROCHLORIDE 1 MG/ML
0.2 INJECTION, SOLUTION INTRAMUSCULAR; INTRAVENOUS; SUBCUTANEOUS
Status: DISCONTINUED | OUTPATIENT
Start: 2024-05-24 | End: 2024-05-24 | Stop reason: HOSPADM

## 2024-05-24 RX ORDER — MIDAZOLAM HYDROCHLORIDE 1 MG/ML
1 INJECTION INTRAMUSCULAR; INTRAVENOUS
Status: DISCONTINUED | OUTPATIENT
Start: 2024-05-24 | End: 2024-05-24 | Stop reason: HOSPADM

## 2024-05-24 RX ORDER — ONDANSETRON 2 MG/ML
INJECTION INTRAMUSCULAR; INTRAVENOUS PRN
Status: DISCONTINUED | OUTPATIENT
Start: 2024-05-24 | End: 2024-05-24 | Stop reason: SURG

## 2024-05-24 RX ORDER — EPHEDRINE SULFATE 50 MG/ML
5 INJECTION, SOLUTION INTRAVENOUS
Status: DISCONTINUED | OUTPATIENT
Start: 2024-05-24 | End: 2024-05-24 | Stop reason: HOSPADM

## 2024-05-24 RX ORDER — HALOPERIDOL 5 MG/ML
1 INJECTION INTRAMUSCULAR
Status: DISCONTINUED | OUTPATIENT
Start: 2024-05-24 | End: 2024-05-24 | Stop reason: HOSPADM

## 2024-05-24 RX ORDER — LABETALOL HYDROCHLORIDE 5 MG/ML
5 INJECTION, SOLUTION INTRAVENOUS
Status: DISCONTINUED | OUTPATIENT
Start: 2024-05-24 | End: 2024-05-24 | Stop reason: HOSPADM

## 2024-05-24 RX ORDER — OXYCODONE HCL 5 MG/5 ML
5 SOLUTION, ORAL ORAL
Status: COMPLETED | OUTPATIENT
Start: 2024-05-24 | End: 2024-05-24

## 2024-05-24 RX ORDER — METOPROLOL TARTRATE 1 MG/ML
1 INJECTION, SOLUTION INTRAVENOUS
Status: DISCONTINUED | OUTPATIENT
Start: 2024-05-24 | End: 2024-05-24 | Stop reason: HOSPADM

## 2024-05-24 RX ORDER — MEPERIDINE HYDROCHLORIDE 25 MG/ML
12.5 INJECTION INTRAMUSCULAR; INTRAVENOUS; SUBCUTANEOUS
Status: DISCONTINUED | OUTPATIENT
Start: 2024-05-24 | End: 2024-05-24 | Stop reason: HOSPADM

## 2024-05-24 RX ORDER — DEXAMETHASONE SODIUM PHOSPHATE 10 MG/ML
INJECTION, SOLUTION INTRAMUSCULAR; INTRAVENOUS
Status: COMPLETED | OUTPATIENT
Start: 2024-05-24 | End: 2024-05-24

## 2024-05-24 RX ORDER — KETOROLAC TROMETHAMINE 15 MG/ML
INJECTION, SOLUTION INTRAMUSCULAR; INTRAVENOUS PRN
Status: DISCONTINUED | OUTPATIENT
Start: 2024-05-24 | End: 2024-05-24 | Stop reason: SURG

## 2024-05-24 RX ORDER — SODIUM CHLORIDE, SODIUM LACTATE, POTASSIUM CHLORIDE, CALCIUM CHLORIDE 600; 310; 30; 20 MG/100ML; MG/100ML; MG/100ML; MG/100ML
INJECTION, SOLUTION INTRAVENOUS CONTINUOUS
Status: DISCONTINUED | OUTPATIENT
Start: 2024-05-24 | End: 2024-05-24 | Stop reason: HOSPADM

## 2024-05-24 RX ORDER — ACETAMINOPHEN 500 MG
1000 TABLET ORAL EVERY 4 HOURS PRN
Status: DISCONTINUED | OUTPATIENT
Start: 2024-05-24 | End: 2024-05-24 | Stop reason: HOSPADM

## 2024-05-24 RX ORDER — MEPERIDINE HYDROCHLORIDE 25 MG/ML
INJECTION INTRAMUSCULAR; INTRAVENOUS; SUBCUTANEOUS PRN
Status: DISCONTINUED | OUTPATIENT
Start: 2024-05-24 | End: 2024-05-24 | Stop reason: SURG

## 2024-05-24 RX ORDER — DEXAMETHASONE SODIUM PHOSPHATE 4 MG/ML
INJECTION, SOLUTION INTRA-ARTICULAR; INTRALESIONAL; INTRAMUSCULAR; INTRAVENOUS; SOFT TISSUE PRN
Status: DISCONTINUED | OUTPATIENT
Start: 2024-05-24 | End: 2024-05-24 | Stop reason: SURG

## 2024-05-24 RX ORDER — MIDAZOLAM HYDROCHLORIDE 1 MG/ML
INJECTION INTRAMUSCULAR; INTRAVENOUS PRN
Status: DISCONTINUED | OUTPATIENT
Start: 2024-05-24 | End: 2024-05-24 | Stop reason: SURG

## 2024-05-24 RX ADMIN — OXYCODONE HYDROCHLORIDE 10 MG: 10 TABLET ORAL at 07:48

## 2024-05-24 RX ADMIN — FENTANYL CITRATE 50 MCG: 50 INJECTION, SOLUTION INTRAMUSCULAR; INTRAVENOUS at 16:34

## 2024-05-24 RX ADMIN — DEXAMETHASONE SODIUM PHOSPHATE 4 MG: 10 INJECTION, SOLUTION INTRAMUSCULAR; INTRAVENOUS at 14:34

## 2024-05-24 RX ADMIN — ACETAMINOPHEN 1000 MG: 500 TABLET, FILM COATED ORAL at 18:21

## 2024-05-24 RX ADMIN — HYDROMORPHONE HYDROCHLORIDE 0.5 MG: 1 INJECTION, SOLUTION INTRAMUSCULAR; INTRAVENOUS; SUBCUTANEOUS at 03:44

## 2024-05-24 RX ADMIN — DEXAMETHASONE SODIUM PHOSPHATE 4 MG: 4 INJECTION INTRA-ARTICULAR; INTRALESIONAL; INTRAMUSCULAR; INTRAVENOUS; SOFT TISSUE at 15:21

## 2024-05-24 RX ADMIN — POLYETHYLENE GLYCOL 3350 1 PACKET: 17 POWDER, FOR SOLUTION ORAL at 05:36

## 2024-05-24 RX ADMIN — CEFAZOLIN 2 G: 2 INJECTION, POWDER, FOR SOLUTION INTRAMUSCULAR; INTRAVENOUS at 22:16

## 2024-05-24 RX ADMIN — DEXAMETHASONE SODIUM PHOSPHATE 4 MG: 10 INJECTION, SOLUTION INTRAMUSCULAR; INTRAVENOUS at 14:25

## 2024-05-24 RX ADMIN — BUPIVACAINE HYDROCHLORIDE 15 ML: 2.5 INJECTION, SOLUTION EPIDURAL; INFILTRATION; INTRACAUDAL at 14:34

## 2024-05-24 RX ADMIN — KETOROLAC TROMETHAMINE 15 MG: 15 INJECTION, SOLUTION INTRAMUSCULAR; INTRAVENOUS at 15:44

## 2024-05-24 RX ADMIN — HYDROMORPHONE HYDROCHLORIDE 0.4 MG: 1 INJECTION, SOLUTION INTRAMUSCULAR; INTRAVENOUS; SUBCUTANEOUS at 16:44

## 2024-05-24 RX ADMIN — SENNOSIDES AND DOCUSATE SODIUM 1 TABLET: 50; 8.6 TABLET ORAL at 21:20

## 2024-05-24 RX ADMIN — Medication 1 EACH: at 05:36

## 2024-05-24 RX ADMIN — OXYCODONE HYDROCHLORIDE 10 MG: 10 TABLET ORAL at 21:20

## 2024-05-24 RX ADMIN — METAXALONE 800 MG: 800 TABLET ORAL at 11:20

## 2024-05-24 RX ADMIN — SODIUM CHLORIDE, POTASSIUM CHLORIDE, SODIUM LACTATE AND CALCIUM CHLORIDE: 600; 310; 30; 20 INJECTION, SOLUTION INTRAVENOUS at 18:20

## 2024-05-24 RX ADMIN — METAXALONE 800 MG: 800 TABLET ORAL at 18:21

## 2024-05-24 RX ADMIN — METAXALONE 800 MG: 800 TABLET ORAL at 05:36

## 2024-05-24 RX ADMIN — MEPERIDINE HYDROCHLORIDE 25 MG: 25 INJECTION INTRAMUSCULAR; INTRAVENOUS; SUBCUTANEOUS at 16:03

## 2024-05-24 RX ADMIN — OXYCODONE HYDROCHLORIDE 10 MG: 10 TABLET ORAL at 18:22

## 2024-05-24 RX ADMIN — BUPIVACAINE HYDROCHLORIDE 25 ML: 2.5 INJECTION, SOLUTION EPIDURAL; INFILTRATION; INTRACAUDAL at 14:25

## 2024-05-24 RX ADMIN — FENTANYL CITRATE 50 MCG: 50 INJECTION, SOLUTION INTRAMUSCULAR; INTRAVENOUS at 16:31

## 2024-05-24 RX ADMIN — ACETAMINOPHEN 1000 MG: 500 TABLET, FILM COATED ORAL at 11:20

## 2024-05-24 RX ADMIN — DOCUSATE SODIUM 100 MG: 100 CAPSULE, LIQUID FILLED ORAL at 05:36

## 2024-05-24 RX ADMIN — CEFAZOLIN 2 G: 1 INJECTION, POWDER, FOR SOLUTION INTRAMUSCULAR; INTRAVENOUS at 14:56

## 2024-05-24 RX ADMIN — FENTANYL CITRATE 100 MCG: 50 INJECTION, SOLUTION INTRAMUSCULAR; INTRAVENOUS at 15:16

## 2024-05-24 RX ADMIN — OXYCODONE HYDROCHLORIDE 10 MG: 5 SOLUTION ORAL at 16:31

## 2024-05-24 RX ADMIN — CELECOXIB 200 MG: 200 CAPSULE ORAL at 18:21

## 2024-05-24 RX ADMIN — PROPOFOL 100 MG: 10 INJECTION, EMULSION INTRAVENOUS at 14:44

## 2024-05-24 RX ADMIN — GABAPENTIN 100 MG: 100 CAPSULE ORAL at 05:36

## 2024-05-24 RX ADMIN — ONDANSETRON 4 MG: 2 INJECTION INTRAMUSCULAR; INTRAVENOUS at 15:22

## 2024-05-24 RX ADMIN — MIDAZOLAM HYDROCHLORIDE 1 MG: 2 INJECTION, SOLUTION INTRAMUSCULAR; INTRAVENOUS at 17:05

## 2024-05-24 RX ADMIN — HYDROMORPHONE HYDROCHLORIDE 0.4 MG: 1 INJECTION, SOLUTION INTRAMUSCULAR; INTRAVENOUS; SUBCUTANEOUS at 16:39

## 2024-05-24 RX ADMIN — FENTANYL CITRATE 50 MCG: 50 INJECTION, SOLUTION INTRAMUSCULAR; INTRAVENOUS at 14:52

## 2024-05-24 RX ADMIN — Medication 1 EACH: at 11:20

## 2024-05-24 RX ADMIN — GABAPENTIN 100 MG: 100 CAPSULE ORAL at 21:20

## 2024-05-24 RX ADMIN — FENTANYL CITRATE 100 MCG: 50 INJECTION, SOLUTION INTRAMUSCULAR; INTRAVENOUS at 15:03

## 2024-05-24 RX ADMIN — FAMOTIDINE 20 MG: 20 TABLET, FILM COATED ORAL at 05:36

## 2024-05-24 RX ADMIN — SENNOSIDES AND DOCUSATE SODIUM 1 TABLET: 50; 8.6 TABLET ORAL at 21:00

## 2024-05-24 RX ADMIN — HYDROMORPHONE HYDROCHLORIDE 0.4 MG: 1 INJECTION, SOLUTION INTRAMUSCULAR; INTRAVENOUS; SUBCUTANEOUS at 16:55

## 2024-05-24 RX ADMIN — CELECOXIB 200 MG: 200 CAPSULE ORAL at 07:49

## 2024-05-24 RX ADMIN — ACETAMINOPHEN 1000 MG: 500 TABLET, FILM COATED ORAL at 05:36

## 2024-05-24 RX ADMIN — HYDROMORPHONE HYDROCHLORIDE 0.4 MG: 1 INJECTION, SOLUTION INTRAMUSCULAR; INTRAVENOUS; SUBCUTANEOUS at 16:49

## 2024-05-24 RX ADMIN — DOCUSATE SODIUM 100 MG: 100 CAPSULE, LIQUID FILLED ORAL at 18:22

## 2024-05-24 RX ADMIN — FENTANYL CITRATE 50 MCG: 50 INJECTION, SOLUTION INTRAMUSCULAR; INTRAVENOUS at 15:41

## 2024-05-24 RX ADMIN — MIDAZOLAM HYDROCHLORIDE 2 MG: 1 INJECTION, SOLUTION INTRAMUSCULAR; INTRAVENOUS at 14:25

## 2024-05-24 RX ADMIN — SODIUM CHLORIDE, POTASSIUM CHLORIDE, SODIUM LACTATE AND CALCIUM CHLORIDE: 600; 310; 30; 20 INJECTION, SOLUTION INTRAVENOUS at 01:27

## 2024-05-24 RX ADMIN — HYDROMORPHONE HYDROCHLORIDE 0.2 MG: 1 INJECTION, SOLUTION INTRAMUSCULAR; INTRAVENOUS; SUBCUTANEOUS at 17:05

## 2024-05-24 RX ADMIN — FENTANYL CITRATE 100 MCG: 50 INJECTION, SOLUTION INTRAMUSCULAR; INTRAVENOUS at 15:33

## 2024-05-24 RX ADMIN — FENTANYL CITRATE 100 MCG: 50 INJECTION, SOLUTION INTRAMUSCULAR; INTRAVENOUS at 14:59

## 2024-05-24 RX ADMIN — ENOXAPARIN SODIUM 40 MG: 100 INJECTION SUBCUTANEOUS at 06:33

## 2024-05-24 RX ADMIN — PROPOFOL 50 MG: 10 INJECTION, EMULSION INTRAVENOUS at 14:46

## 2024-05-24 RX ADMIN — OXYCODONE HYDROCHLORIDE 10 MG: 10 TABLET ORAL at 01:25

## 2024-05-24 ASSESSMENT — PAIN DESCRIPTION - PAIN TYPE
TYPE: SURGICAL PAIN
TYPE: ACUTE PAIN
TYPE: ACUTE PAIN;SURGICAL PAIN
TYPE: ACUTE PAIN
TYPE: ACUTE PAIN;SURGICAL PAIN
TYPE: SURGICAL PAIN
TYPE: ACUTE PAIN
TYPE: SURGICAL PAIN

## 2024-05-24 ASSESSMENT — ENCOUNTER SYMPTOMS
MYALGIAS: 1
PSYCHIATRIC NEGATIVE: 1
NEUROLOGICAL NEGATIVE: 1
RESPIRATORY NEGATIVE: 1

## 2024-05-24 ASSESSMENT — COGNITIVE AND FUNCTIONAL STATUS - GENERAL
MOBILITY SCORE: 6
TURNING FROM BACK TO SIDE WHILE IN FLAT BAD: TOTAL
CLIMB 3 TO 5 STEPS WITH RAILING: TOTAL
SUGGESTED CMS G CODE MODIFIER MOBILITY: CN
DRESSING REGULAR LOWER BODY CLOTHING: TOTAL
STANDING UP FROM CHAIR USING ARMS: TOTAL
PERSONAL GROOMING: TOTAL
MOVING FROM LYING ON BACK TO SITTING ON SIDE OF FLAT BED: TOTAL
HELP NEEDED FOR BATHING: TOTAL
MOVING TO AND FROM BED TO CHAIR: TOTAL
WALKING IN HOSPITAL ROOM: TOTAL
SUGGESTED CMS G CODE MODIFIER DAILY ACTIVITY: CN
TOILETING: TOTAL
DRESSING REGULAR UPPER BODY CLOTHING: TOTAL
EATING MEALS: TOTAL
DAILY ACTIVITIY SCORE: 6

## 2024-05-24 ASSESSMENT — FIBROSIS 4 INDEX: FIB4 SCORE: 0.91

## 2024-05-24 ASSESSMENT — PAIN SCALES - GENERAL: PAIN_LEVEL: 0

## 2024-05-24 NOTE — OR NURSING
0721 Dr. Beckford, anesthesiologist is aware that patient K is 3.0 and is ok to go for surgery today at scheduled time.

## 2024-05-24 NOTE — PROGRESS NOTES
4 Eyes Skin Assessment Completed by JUAN F Pruitt and JUAN F Dumont.    Head WDL  Ears WDL  Nose WDL  Mouth WDL  Neck WDL  Breast/Chest WDL  Shoulder Blades LANA, pt refuses turns at this time d/t pain  Spine LANA, pt refuses turns at this time d/t pain  (R) Arm/Elbow/Hand Abrasion to shoulder, Laceration to forearm with sutures in place  (L) Arm/Elbow/Hand splint to LUE  Abdomen WDL  Groin WDL  Scrotum/Coccyx/Buttocks LANA, pt refuses turns at this time d/t pain  (R) Leg Abrasion, laceration  (L) Leg Scab, Bruising, and Abrasion  (R) Heel/Foot/Toe LANA, splint in place  (L) Heel/Foot/Toe scattered abrasions          Devices In Places Blood Pressure Cuff      Interventions In Place Pillows, Heels Loaded W/Pillows, and Pressure Redistribution Mattress    Possible Skin Injury Yes    Pictures Uploaded Into Epic Yes  Wound Consult Placed N/A  RN Wound Prevention Protocol Ordered No

## 2024-05-24 NOTE — ANESTHESIA PROCEDURE NOTES
Peripheral Block    Date/Time: 5/24/2024 2:20 PM    Performed by: Javier Beckford M.D.  Authorized by: Javier Beckford M.D.    Start Time:  5/24/2024 2:20 PM  End Time:  5/24/2024 2:35 PM  Reason for Block: at surgeon's request and post-op pain management ONLY    patient identified, IV checked, site marked, risks and benefits discussed, surgical consent, monitors and equipment checked, pre-op evaluation and timeout performed    Patient Position:  Supine  Prep: ChloraPrep    Monitoring:  Heart rate, continuous pulse ox and cardiac monitor  Block Region:  Lower Extremity  Lower Extremity - Block Type:  Selective FEMORAL nerve block at the Adductor Canal and SCIATIC nerve block, lateral approach    Laterality:  Left  Procedures: ultrasound guided  Image captured, interpreted and electronically stored.  Local Infiltration:  Lidocaine  Strength:  1 %  Dose:  3 ml  Block Type:  Single-shot  Needle Length:  100mm  Needle Gauge:  21 G  Needle Localization:  Ultrasound guidance  Ultrasound picture in chart  Injection Assessment:  Negative aspiration for heme, no paresthesia on injection, incremental injection and local visualized surrounding nerve on ultrasound   With adductor canal block, blood was aspirated thru the needle. Needle withdrawn and no blood aspirated and injection continued.

## 2024-05-24 NOTE — CARE PLAN
The patient is Stable - Low risk of patient condition declining or worsening    Shift Goals  Clinical Goals: Pain control, NPO  Patient Goals: Pain control, comfort  Family Goals: N/A    Progress made toward(s) clinical / shift goals:    Problem: Knowledge Deficit - Standard  Goal: Patient and family/care givers will demonstrate understanding of plan of care, disease process/condition, diagnostic tests and medications  Outcome: Progressing     Problem: Fall Risk  Goal: Patient will remain free from falls  Outcome: Progressing     Problem: Pain - Standard  Goal: Alleviation of pain or a reduction in pain to the patient’s comfort goal  Outcome: Progressing       Patient is not progressing towards the following goals:

## 2024-05-24 NOTE — ANESTHESIA PREPROCEDURE EVALUATION
Case: [id number] Date/Time: 05/24/24 1141    Procedures:       ORIF, ANKLE (Left)      ORIF, FOOT (Left)    Location: [place] [id number] / SURGERY [facility]    Surgeons: J Carlos Pollard M.D.            Relevant Problems   No relevant active problems       Physical Exam    Airway   Mallampati: II  TM distance: >3 FB  Neck ROM: full       Cardiovascular - normal exam  Rhythm: regular  Rate: normal  (-) murmur     Dental - normal exam           Pulmonary - normal exam  Breath sounds clear to auscultation     Abdominal    Neurological - normal exam                   Anesthesia Plan    ASA 2       Plan - general and peripheral nerve block     Peripheral nerve block will be post-op pain control  Airway plan will be LMA          Induction: intravenous    Postoperative Plan: Postoperative administration of opioids is intended.    Pertinent diagnostic labs and testing reviewed    Informed Consent:    Anesthetic plan and risks discussed with patient.    Use of blood products discussed with: patient whom consented to blood products.
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
## 2024-05-24 NOTE — ANESTHESIA PROCEDURE NOTES
Airway    Date/Time: 5/24/2024 2:46 PM    Performed by: Javier Beckford M.D.  Authorized by: Javier Beckford M.D.    Location:  OR  Urgency:  Elective  Indications for Airway Management:  Anesthesia      Spontaneous Ventilation: absent    Sedation Level:  Deep  Preoxygenated: Yes    Mask Difficulty Assessment:  1 - vent by mask  Final Airway Type:  Supraglottic airway  Final Supraglottic Airway:  Standard LMA    SGA Size:  5  Number of Attempts at Approach:  1

## 2024-05-24 NOTE — ASSESSMENT & PLAN NOTE
Acute closed nondisplaced transverse fracture of the distal metadiaphysis of the right fibula. Comminuted fracture of the sustentaculum nuha. Impaction fracture of the lateral aspect of the talar dome with intra-articular fracture fragments. Avulsion fracture of the volar plate of the distal right tibial epiphysis.  Non-operative management.  Weight bearing status - Nonweightbearing RLE. Fracture boot   J Carlos Pollard MD. Orthopedic Surgeon. [facility].

## 2024-05-24 NOTE — ANESTHESIA TIME REPORT
Anesthesia Start and Stop Event Times       Date Time Event    5/24/2024 1402 Ready for Procedure     1439 Anesthesia Start     1616 Anesthesia Stop          Responsible Staff  05/24/24      Name Role Begin End    Javier Beckford M.D. Anesth 1439 1616          Overtime Reason:  no overtime (within assigned shift)    Comments:

## 2024-05-24 NOTE — THERAPY
Physical Therapy Contact Note    Patient Name: Michele Ninety-One  Age:  33 y.o., Sex:  male  Medical Record #: [id number]  Today's Date: 5/24/2024    PT consult received and chart reviewed. Per ortho, pt is pending surgical fixation of BLE injuries. Will hold and follow post-op as able/appropriate.
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
## 2024-05-24 NOTE — ANESTHESIA POSTPROCEDURE EVALUATION
Patient: Hayden Manaznares II    Procedure Summary       Date: 05/24/24 Room / Location: [place] [id number] / SURGERY [facility]    Anesthesia Start: 1439 Anesthesia Stop: 1616    Procedures:       ORIF, ANKLE (Left: Ankle)      REPAIR, LACERATION (Left: Leg Lower) Diagnosis: (left bimalleolar ankle fracture)    Surgeons: J Carlos Pollard M.D. Responsible Provider: Javier Beckford M.D.    Anesthesia Type: general, peripheral nerve block ASA Status: 2            Final Anesthesia Type: general, peripheral nerve block  Last vitals  BP   Blood Pressure: 136/87    Temp   36.2 °C (97.2 °F)    Pulse   79   Resp   16    SpO2   96 %      Anesthesia Post Evaluation    Patient location during evaluation: PACU  Patient participation: complete - patient participated  Level of consciousness: awake and alert  Pain score: 0    Airway patency: patent  Anesthetic complications: no  Cardiovascular status: hemodynamically stable  Respiratory status: acceptable  Hydration status: euvolemic    PONV: none          No notable events documented.     Nurse Pain Score: 9 (NPRS)

## 2024-05-25 ENCOUNTER — APPOINTMENT (OUTPATIENT)
Dept: RADIOLOGY | Facility: MEDICAL CENTER | Age: 34
End: 2024-05-25
Payer: OTHER MISCELLANEOUS

## 2024-05-25 PROBLEM — S89.91XA RIGHT KNEE INJURY, INITIAL ENCOUNTER: Status: ACTIVE | Noted: 2024-05-25

## 2024-05-25 PROBLEM — S82.141A CLOSED FRACTURE OF RIGHT TIBIAL PLATEAU: Status: ACTIVE | Noted: 2024-05-25

## 2024-05-25 LAB
ANION GAP SERPL CALC-SCNC: 11 MMOL/L (ref 7–16)
ANION GAP SERPL CALC-SCNC: 11 MMOL/L (ref 7–16)
BASOPHILS # BLD AUTO: 0 % (ref 0–1.8)
BASOPHILS # BLD AUTO: 0 % (ref 0–1.8)
BASOPHILS # BLD: 0 K/UL (ref 0–0.12)
BASOPHILS # BLD: 0 K/UL (ref 0–0.12)
BUN SERPL-MCNC: 15 MG/DL (ref 8–22)
BUN SERPL-MCNC: 15 MG/DL (ref 8–22)
CALCIUM SERPL-MCNC: 8.4 MG/DL (ref 8.5–10.5)
CALCIUM SERPL-MCNC: 8.4 MG/DL (ref 8.5–10.5)
CHLORIDE SERPL-SCNC: 105 MMOL/L (ref 96–112)
CHLORIDE SERPL-SCNC: 105 MMOL/L (ref 96–112)
CO2 SERPL-SCNC: 21 MMOL/L (ref 20–33)
CO2 SERPL-SCNC: 21 MMOL/L (ref 20–33)
CREAT SERPL-MCNC: 1.02 MG/DL (ref 0.5–1.4)
CREAT SERPL-MCNC: 1.02 MG/DL (ref 0.5–1.4)
EOSINOPHIL # BLD AUTO: 0 K/UL (ref 0–0.51)
EOSINOPHIL # BLD AUTO: 0 K/UL (ref 0–0.51)
EOSINOPHIL NFR BLD: 0 % (ref 0–6.9)
EOSINOPHIL NFR BLD: 0 % (ref 0–6.9)
ERYTHROCYTE [DISTWIDTH] IN BLOOD BY AUTOMATED COUNT: 38 FL (ref 35.9–50)
ERYTHROCYTE [DISTWIDTH] IN BLOOD BY AUTOMATED COUNT: 38 FL (ref 35.9–50)
GFR SERPLBLD CREATININE-BSD FMLA CKD-EPI: 100 ML/MIN/1.73 M 2
GFR SERPLBLD CREATININE-BSD FMLA CKD-EPI: 100 ML/MIN/1.73 M 2
GLUCOSE SERPL-MCNC: 125 MG/DL (ref 65–99)
GLUCOSE SERPL-MCNC: 125 MG/DL (ref 65–99)
HCT VFR BLD AUTO: 30.7 % (ref 42–52)
HCT VFR BLD AUTO: 30.7 % (ref 42–52)
HGB BLD-MCNC: 10.9 G/DL (ref 14–18)
HGB BLD-MCNC: 10.9 G/DL (ref 14–18)
IMM GRANULOCYTES # BLD AUTO: 0.04 K/UL (ref 0–0.11)
IMM GRANULOCYTES # BLD AUTO: 0.04 K/UL (ref 0–0.11)
IMM GRANULOCYTES NFR BLD AUTO: 0.5 % (ref 0–0.9)
IMM GRANULOCYTES NFR BLD AUTO: 0.5 % (ref 0–0.9)
LYMPHOCYTES # BLD AUTO: 0.76 K/UL (ref 1–4.8)
LYMPHOCYTES # BLD AUTO: 0.76 K/UL (ref 1–4.8)
LYMPHOCYTES NFR BLD: 10 % (ref 22–41)
LYMPHOCYTES NFR BLD: 10 % (ref 22–41)
MCH RBC QN AUTO: 29.1 PG (ref 27–33)
MCH RBC QN AUTO: 29.1 PG (ref 27–33)
MCHC RBC AUTO-ENTMCNC: 35.5 G/DL (ref 32.3–36.5)
MCHC RBC AUTO-ENTMCNC: 35.5 G/DL (ref 32.3–36.5)
MCV RBC AUTO: 82.1 FL (ref 81.4–97.8)
MCV RBC AUTO: 82.1 FL (ref 81.4–97.8)
MONOCYTES # BLD AUTO: 0.61 K/UL (ref 0–0.85)
MONOCYTES # BLD AUTO: 0.61 K/UL (ref 0–0.85)
MONOCYTES NFR BLD AUTO: 8 % (ref 0–13.4)
MONOCYTES NFR BLD AUTO: 8 % (ref 0–13.4)
NEUTROPHILS # BLD AUTO: 6.22 K/UL (ref 1.82–7.42)
NEUTROPHILS # BLD AUTO: 6.22 K/UL (ref 1.82–7.42)
NEUTROPHILS NFR BLD: 81.5 % (ref 44–72)
NEUTROPHILS NFR BLD: 81.5 % (ref 44–72)
NRBC # BLD AUTO: 0 K/UL
NRBC # BLD AUTO: 0 K/UL
NRBC BLD-RTO: 0 /100 WBC (ref 0–0.2)
NRBC BLD-RTO: 0 /100 WBC (ref 0–0.2)
PLATELET # BLD AUTO: 135 K/UL (ref 164–446)
PLATELET # BLD AUTO: 135 K/UL (ref 164–446)
PMV BLD AUTO: 10.5 FL (ref 9–12.9)
PMV BLD AUTO: 10.5 FL (ref 9–12.9)
POTASSIUM SERPL-SCNC: 4.5 MMOL/L (ref 3.6–5.5)
POTASSIUM SERPL-SCNC: 4.5 MMOL/L (ref 3.6–5.5)
RBC # BLD AUTO: 3.74 M/UL (ref 4.7–6.1)
RBC # BLD AUTO: 3.74 M/UL (ref 4.7–6.1)
SODIUM SERPL-SCNC: 137 MMOL/L (ref 135–145)
SODIUM SERPL-SCNC: 137 MMOL/L (ref 135–145)
WBC # BLD AUTO: 7.6 K/UL (ref 4.8–10.8)
WBC # BLD AUTO: 7.6 K/UL (ref 4.8–10.8)

## 2024-05-25 PROCEDURE — 700111 HCHG RX REV CODE 636 W/ 250 OVERRIDE (IP): Mod: JZ | Performed by: PHYSICIAN ASSISTANT

## 2024-05-25 PROCEDURE — 700111 HCHG RX REV CODE 636 W/ 250 OVERRIDE (IP): Performed by: ORTHOPAEDIC SURGERY

## 2024-05-25 PROCEDURE — 71045 X-RAY EXAM CHEST 1 VIEW: CPT

## 2024-05-25 PROCEDURE — 36415 COLL VENOUS BLD VENIPUNCTURE: CPT

## 2024-05-25 PROCEDURE — A9270 NON-COVERED ITEM OR SERVICE: HCPCS

## 2024-05-25 PROCEDURE — 99233 SBSQ HOSP IP/OBS HIGH 50: CPT | Performed by: NURSE PRACTITIONER

## 2024-05-25 PROCEDURE — 80048 BASIC METABOLIC PNL TOTAL CA: CPT

## 2024-05-25 PROCEDURE — 700102 HCHG RX REV CODE 250 W/ 637 OVERRIDE(OP)

## 2024-05-25 PROCEDURE — 94669 MECHANICAL CHEST WALL OSCILL: CPT

## 2024-05-25 PROCEDURE — 85025 COMPLETE CBC W/AUTO DIFF WBC: CPT

## 2024-05-25 PROCEDURE — 700105 HCHG RX REV CODE 258: Performed by: ORTHOPAEDIC SURGERY

## 2024-05-25 PROCEDURE — 770001 HCHG ROOM/CARE - MED/SURG/GYN PRIV*

## 2024-05-25 PROCEDURE — 700111 HCHG RX REV CODE 636 W/ 250 OVERRIDE (IP)

## 2024-05-25 PROCEDURE — 700101 HCHG RX REV CODE 250

## 2024-05-25 RX ORDER — FAMOTIDINE 20 MG/1
20 TABLET, FILM COATED ORAL 2 TIMES DAILY
Status: DISCONTINUED | OUTPATIENT
Start: 2024-05-25 | End: 2024-05-29

## 2024-05-25 RX ADMIN — ACETAMINOPHEN 1000 MG: 500 TABLET, FILM COATED ORAL at 00:19

## 2024-05-25 RX ADMIN — Medication 1 EACH: at 12:24

## 2024-05-25 RX ADMIN — DOCUSATE SODIUM 100 MG: 100 CAPSULE, LIQUID FILLED ORAL at 04:20

## 2024-05-25 RX ADMIN — HYDROMORPHONE HYDROCHLORIDE 0.5 MG: 1 INJECTION, SOLUTION INTRAMUSCULAR; INTRAVENOUS; SUBCUTANEOUS at 22:59

## 2024-05-25 RX ADMIN — OXYCODONE HYDROCHLORIDE 10 MG: 10 TABLET ORAL at 18:39

## 2024-05-25 RX ADMIN — SENNOSIDES AND DOCUSATE SODIUM 1 TABLET: 50; 8.6 TABLET ORAL at 21:42

## 2024-05-25 RX ADMIN — CEFAZOLIN 2 G: 2 INJECTION, POWDER, FOR SOLUTION INTRAMUSCULAR; INTRAVENOUS at 04:52

## 2024-05-25 RX ADMIN — OXYCODONE 5 MG: 5 TABLET ORAL at 05:57

## 2024-05-25 RX ADMIN — CELECOXIB 200 MG: 200 CAPSULE ORAL at 04:18

## 2024-05-25 RX ADMIN — OXYCODONE HYDROCHLORIDE 10 MG: 10 TABLET ORAL at 10:32

## 2024-05-25 RX ADMIN — METAXALONE 800 MG: 800 TABLET ORAL at 16:17

## 2024-05-25 RX ADMIN — DOCUSATE SODIUM 100 MG: 100 CAPSULE, LIQUID FILLED ORAL at 16:17

## 2024-05-25 RX ADMIN — Medication 1 EACH: at 16:17

## 2024-05-25 RX ADMIN — METAXALONE 800 MG: 800 TABLET ORAL at 04:19

## 2024-05-25 RX ADMIN — OXYCODONE HYDROCHLORIDE 10 MG: 10 TABLET ORAL at 21:40

## 2024-05-25 RX ADMIN — OXYCODONE HYDROCHLORIDE 10 MG: 10 TABLET ORAL at 13:51

## 2024-05-25 RX ADMIN — ENOXAPARIN SODIUM 40 MG: 100 INJECTION SUBCUTANEOUS at 04:19

## 2024-05-25 RX ADMIN — LIDOCAINE 1 PATCH: 4 PATCH TOPICAL at 16:17

## 2024-05-25 RX ADMIN — HYDROMORPHONE HYDROCHLORIDE 0.5 MG: 1 INJECTION, SOLUTION INTRAMUSCULAR; INTRAVENOUS; SUBCUTANEOUS at 06:57

## 2024-05-25 RX ADMIN — HYDROMORPHONE HYDROCHLORIDE 0.5 MG: 1 INJECTION, SOLUTION INTRAMUSCULAR; INTRAVENOUS; SUBCUTANEOUS at 15:09

## 2024-05-25 RX ADMIN — LIDOCAINE HYDROCHLORIDE 15 ML: 20 SOLUTION OROPHARYNGEAL at 22:16

## 2024-05-25 RX ADMIN — GABAPENTIN 100 MG: 100 CAPSULE ORAL at 04:19

## 2024-05-25 RX ADMIN — METAXALONE 800 MG: 800 TABLET ORAL at 12:24

## 2024-05-25 RX ADMIN — FAMOTIDINE 20 MG: 20 TABLET, FILM COATED ORAL at 22:16

## 2024-05-25 RX ADMIN — HYDROMORPHONE HYDROCHLORIDE 0.5 MG: 1 INJECTION, SOLUTION INTRAMUSCULAR; INTRAVENOUS; SUBCUTANEOUS at 19:44

## 2024-05-25 RX ADMIN — ENOXAPARIN SODIUM 40 MG: 100 INJECTION SUBCUTANEOUS at 16:17

## 2024-05-25 RX ADMIN — GABAPENTIN 100 MG: 100 CAPSULE ORAL at 12:24

## 2024-05-25 RX ADMIN — POLYETHYLENE GLYCOL 3350 1 PACKET: 17 POWDER, FOR SOLUTION ORAL at 04:18

## 2024-05-25 RX ADMIN — ACETAMINOPHEN 1000 MG: 500 TABLET, FILM COATED ORAL at 12:24

## 2024-05-25 RX ADMIN — ACETAMINOPHEN 1000 MG: 500 TABLET, FILM COATED ORAL at 06:00

## 2024-05-25 RX ADMIN — OXYCODONE HYDROCHLORIDE 10 MG: 10 TABLET ORAL at 01:20

## 2024-05-25 RX ADMIN — Medication 1 EACH: at 04:20

## 2024-05-25 RX ADMIN — POLYETHYLENE GLYCOL 3350 1 PACKET: 17 POWDER, FOR SOLUTION ORAL at 16:17

## 2024-05-25 RX ADMIN — GABAPENTIN 100 MG: 100 CAPSULE ORAL at 21:37

## 2024-05-25 RX ADMIN — CELECOXIB 200 MG: 200 CAPSULE ORAL at 16:17

## 2024-05-25 ASSESSMENT — ENCOUNTER SYMPTOMS
NECK PAIN: 0
BACK PAIN: 0
MYALGIAS: 1
RESPIRATORY NEGATIVE: 1
PSYCHIATRIC NEGATIVE: 1
BLURRED VISION: 0
GASTROINTESTINAL NEGATIVE: 1
NEUROLOGICAL NEGATIVE: 1

## 2024-05-25 ASSESSMENT — PAIN DESCRIPTION - PAIN TYPE
TYPE: ACUTE PAIN;SURGICAL PAIN

## 2024-05-25 NOTE — ASSESSMENT & PLAN NOTE
Fracture of the intercondylar eminence of the tibia extending through the posterior and lateral aspect of the medial tibial plateau.  Non-operative management.  Weight bearing status - Nonweightbearing RLE. Hinged knee brace locked at 20 degrees to the right knee.  J Carlos Pollard MD. Orthopedic Surgeon. [facility].

## 2024-05-25 NOTE — CARE PLAN
The patient is Stable - Low risk of patient condition declining or worsening    Shift Goals  Clinical Goals: pain control, OR today  Patient Goals: pain control  Family Goals: N/A    Progress made toward(s) clinical / shift goals:    Problem: Fall Risk  Goal: Patient will remain free from falls  Outcome: Progressing  Note: Bed alarm on, call light and side table in reach, treaded socks on, no DME at bedside, hourly rounding in place.       Problem: Pain - Standard  Goal: Alleviation of pain or a reduction in pain to the patient’s comfort goal  Outcome: Progressing  Note: Pt states pain is relieved with current pain medication regimen. Pt medicated per MAR with + results. Pt provided ice packs and pillows for comfort.

## 2024-05-25 NOTE — CARE PLAN
Problem: Knowledge Deficit - Standard  Goal: Patient and family/care givers will demonstrate understanding of plan of care, disease process/condition, diagnostic tests and medications  Outcome: Progressing     Problem: Skin Integrity  Goal: Skin integrity is maintained or improved  Outcome: Progressing     Problem: Fall Risk  Goal: Patient will remain free from falls  Outcome: Progressing     Problem: Pain - Standard  Goal: Alleviation of pain or a reduction in pain to the patient’s comfort goal  Outcome: Progressing    The patient is Stable - Low risk of patient condition declining or worsening    Shift Goals  Clinical Goals: Pain management, safety  Patient Goals: Pain control, comfort  Family Goals: Update patient's status    Progress made toward(s) clinical / shift goals: Reduction of pain. Verbalized understanding towards the side effects of the medication provided. Placed bed in a lowest possible position, placed bed side table and call bell within reach, side rails up x2. Kept safe and comfortably.

## 2024-05-25 NOTE — PROGRESS NOTES
1810  Pt arrived to unit via bed. A&O x 4, pain 6/10, VSS on 2L O2, dressing to LLE in place, with all belongings at bedside. Pt oriented to unit, call light and belongings within reach, educated to call for assistance.
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
## 2024-05-25 NOTE — CARE PLAN
The patient is Stable - Low risk of patient condition declining or worsening    Shift Goals  Clinical Goals: pain control, mobility  Patient Goals: pain control  Family Goals: Updated patient's status    Progress made toward(s) clinical / shift goals:    Problem: Fall Risk  Goal: Patient will remain free from falls  Outcome: Progressing  Note: Bed alarm on, call light and side table in reach, treaded socks on, no DME at bedside, hourly rounding in place.       Problem: Pain - Standard  Goal: Alleviation of pain or a reduction in pain to the patient’s comfort goal  Outcome: Progressing  Note: Pt states pain is relieved with current pain medication regimen. Pt medicated per MAR with + results. Pt provided ice packs and pillows for comfort.

## 2024-05-25 NOTE — OR NURSING
1609: Pt arrived via bed with anesthesia and RN. Report received. See flowsheet for VS. Dressing CDI. LANA left pedal pulse. Orders reviewed and initiated.   1804: Report called to JUAN F Dumont. All questions answered. VSS. Dressing CDI. No patient distress. Alert and oriented x4. Pt transported to room in stable condition on 2L NC with nose ring at bedside. Family updated.

## 2024-05-25 NOTE — CARE PLAN
Problem: Hyperinflation  Goal: Prevent or improve atelectasis  Description: Target End Date:  3 to 4 days    1. Instruct incentive spirometry usage  2.  Perform hyperinflation therapy as indicated  Outcome: Progressing     PEP QID  IS: 2400

## 2024-05-26 LAB
ANION GAP SERPL CALC-SCNC: 12 MMOL/L (ref 7–16)
ANION GAP SERPL CALC-SCNC: 12 MMOL/L (ref 7–16)
BASOPHILS # BLD AUTO: 0.3 % (ref 0–1.8)
BASOPHILS # BLD AUTO: 0.3 % (ref 0–1.8)
BASOPHILS # BLD: 0.02 K/UL (ref 0–0.12)
BASOPHILS # BLD: 0.02 K/UL (ref 0–0.12)
BUN SERPL-MCNC: 14 MG/DL (ref 8–22)
BUN SERPL-MCNC: 14 MG/DL (ref 8–22)
CALCIUM SERPL-MCNC: 8 MG/DL (ref 8.5–10.5)
CALCIUM SERPL-MCNC: 8 MG/DL (ref 8.5–10.5)
CHLORIDE SERPL-SCNC: 107 MMOL/L (ref 96–112)
CHLORIDE SERPL-SCNC: 107 MMOL/L (ref 96–112)
CO2 SERPL-SCNC: 21 MMOL/L (ref 20–33)
CO2 SERPL-SCNC: 21 MMOL/L (ref 20–33)
CREAT SERPL-MCNC: 0.99 MG/DL (ref 0.5–1.4)
CREAT SERPL-MCNC: 0.99 MG/DL (ref 0.5–1.4)
EOSINOPHIL # BLD AUTO: 0.03 K/UL (ref 0–0.51)
EOSINOPHIL # BLD AUTO: 0.03 K/UL (ref 0–0.51)
EOSINOPHIL NFR BLD: 0.5 % (ref 0–6.9)
EOSINOPHIL NFR BLD: 0.5 % (ref 0–6.9)
ERYTHROCYTE [DISTWIDTH] IN BLOOD BY AUTOMATED COUNT: 40.3 FL (ref 35.9–50)
ERYTHROCYTE [DISTWIDTH] IN BLOOD BY AUTOMATED COUNT: 40.3 FL (ref 35.9–50)
GFR SERPLBLD CREATININE-BSD FMLA CKD-EPI: 103 ML/MIN/1.73 M 2
GFR SERPLBLD CREATININE-BSD FMLA CKD-EPI: 103 ML/MIN/1.73 M 2
GLUCOSE SERPL-MCNC: 83 MG/DL (ref 65–99)
GLUCOSE SERPL-MCNC: 83 MG/DL (ref 65–99)
HCT VFR BLD AUTO: 27.5 % (ref 42–52)
HCT VFR BLD AUTO: 27.5 % (ref 42–52)
HGB BLD-MCNC: 9.7 G/DL (ref 14–18)
HGB BLD-MCNC: 9.7 G/DL (ref 14–18)
IMM GRANULOCYTES # BLD AUTO: 0.02 K/UL (ref 0–0.11)
IMM GRANULOCYTES # BLD AUTO: 0.02 K/UL (ref 0–0.11)
IMM GRANULOCYTES NFR BLD AUTO: 0.3 % (ref 0–0.9)
IMM GRANULOCYTES NFR BLD AUTO: 0.3 % (ref 0–0.9)
LYMPHOCYTES # BLD AUTO: 1.54 K/UL (ref 1–4.8)
LYMPHOCYTES # BLD AUTO: 1.54 K/UL (ref 1–4.8)
LYMPHOCYTES NFR BLD: 23.2 % (ref 22–41)
LYMPHOCYTES NFR BLD: 23.2 % (ref 22–41)
MCH RBC QN AUTO: 29.4 PG (ref 27–33)
MCH RBC QN AUTO: 29.4 PG (ref 27–33)
MCHC RBC AUTO-ENTMCNC: 35.3 G/DL (ref 32.3–36.5)
MCHC RBC AUTO-ENTMCNC: 35.3 G/DL (ref 32.3–36.5)
MCV RBC AUTO: 83.3 FL (ref 81.4–97.8)
MCV RBC AUTO: 83.3 FL (ref 81.4–97.8)
MONOCYTES # BLD AUTO: 0.53 K/UL (ref 0–0.85)
MONOCYTES # BLD AUTO: 0.53 K/UL (ref 0–0.85)
MONOCYTES NFR BLD AUTO: 8 % (ref 0–13.4)
MONOCYTES NFR BLD AUTO: 8 % (ref 0–13.4)
NEUTROPHILS # BLD AUTO: 4.5 K/UL (ref 1.82–7.42)
NEUTROPHILS # BLD AUTO: 4.5 K/UL (ref 1.82–7.42)
NEUTROPHILS NFR BLD: 67.7 % (ref 44–72)
NEUTROPHILS NFR BLD: 67.7 % (ref 44–72)
NRBC # BLD AUTO: 0 K/UL
NRBC # BLD AUTO: 0 K/UL
NRBC BLD-RTO: 0 /100 WBC (ref 0–0.2)
NRBC BLD-RTO: 0 /100 WBC (ref 0–0.2)
PLATELET # BLD AUTO: 154 K/UL (ref 164–446)
PLATELET # BLD AUTO: 154 K/UL (ref 164–446)
PMV BLD AUTO: 10.8 FL (ref 9–12.9)
PMV BLD AUTO: 10.8 FL (ref 9–12.9)
POTASSIUM SERPL-SCNC: 4.3 MMOL/L (ref 3.6–5.5)
POTASSIUM SERPL-SCNC: 4.3 MMOL/L (ref 3.6–5.5)
RBC # BLD AUTO: 3.3 M/UL (ref 4.7–6.1)
RBC # BLD AUTO: 3.3 M/UL (ref 4.7–6.1)
SODIUM SERPL-SCNC: 140 MMOL/L (ref 135–145)
SODIUM SERPL-SCNC: 140 MMOL/L (ref 135–145)
WBC # BLD AUTO: 6.6 K/UL (ref 4.8–10.8)
WBC # BLD AUTO: 6.6 K/UL (ref 4.8–10.8)

## 2024-05-26 PROCEDURE — 700111 HCHG RX REV CODE 636 W/ 250 OVERRIDE (IP): Performed by: PHYSICIAN ASSISTANT

## 2024-05-26 PROCEDURE — A9270 NON-COVERED ITEM OR SERVICE: HCPCS

## 2024-05-26 PROCEDURE — 700105 HCHG RX REV CODE 258

## 2024-05-26 PROCEDURE — 700111 HCHG RX REV CODE 636 W/ 250 OVERRIDE (IP): Mod: JZ | Performed by: PHYSICIAN ASSISTANT

## 2024-05-26 PROCEDURE — 36415 COLL VENOUS BLD VENIPUNCTURE: CPT

## 2024-05-26 PROCEDURE — A9270 NON-COVERED ITEM OR SERVICE: HCPCS | Performed by: PHYSICIAN ASSISTANT

## 2024-05-26 PROCEDURE — 770001 HCHG ROOM/CARE - MED/SURG/GYN PRIV*

## 2024-05-26 PROCEDURE — 700102 HCHG RX REV CODE 250 W/ 637 OVERRIDE(OP): Performed by: PHYSICIAN ASSISTANT

## 2024-05-26 PROCEDURE — 99232 SBSQ HOSP IP/OBS MODERATE 35: CPT | Performed by: PHYSICIAN ASSISTANT

## 2024-05-26 PROCEDURE — 85025 COMPLETE CBC W/AUTO DIFF WBC: CPT

## 2024-05-26 PROCEDURE — 700102 HCHG RX REV CODE 250 W/ 637 OVERRIDE(OP)

## 2024-05-26 PROCEDURE — 80048 BASIC METABOLIC PNL TOTAL CA: CPT

## 2024-05-26 PROCEDURE — 700111 HCHG RX REV CODE 636 W/ 250 OVERRIDE (IP)

## 2024-05-26 PROCEDURE — 700101 HCHG RX REV CODE 250

## 2024-05-26 PROCEDURE — 94669 MECHANICAL CHEST WALL OSCILL: CPT

## 2024-05-26 RX ORDER — LIDOCAINE 4 G/G
1-3 PATCH TOPICAL EVERY 24 HOURS
Status: DISCONTINUED | OUTPATIENT
Start: 2024-05-26 | End: 2024-06-26 | Stop reason: HOSPADM

## 2024-05-26 RX ORDER — OXYCODONE HYDROCHLORIDE 10 MG/1
10 TABLET ORAL
Status: DISCONTINUED | OUTPATIENT
Start: 2024-05-26 | End: 2024-05-28

## 2024-05-26 RX ORDER — OXYCODONE HYDROCHLORIDE 15 MG/1
15 TABLET ORAL
Status: DISCONTINUED | OUTPATIENT
Start: 2024-05-26 | End: 2024-05-28

## 2024-05-26 RX ORDER — GABAPENTIN 100 MG/1
200 CAPSULE ORAL EVERY 8 HOURS
Status: DISCONTINUED | OUTPATIENT
Start: 2024-05-26 | End: 2024-05-31

## 2024-05-26 RX ORDER — HYDROMORPHONE HYDROCHLORIDE 1 MG/ML
0.25 INJECTION, SOLUTION INTRAMUSCULAR; INTRAVENOUS; SUBCUTANEOUS EVERY 4 HOURS PRN
Status: DISCONTINUED | OUTPATIENT
Start: 2024-05-26 | End: 2024-05-28

## 2024-05-26 RX ADMIN — OXYCODONE HYDROCHLORIDE 15 MG: 15 TABLET ORAL at 15:05

## 2024-05-26 RX ADMIN — OXYCODONE HYDROCHLORIDE 10 MG: 10 TABLET ORAL at 04:26

## 2024-05-26 RX ADMIN — ACETAMINOPHEN 1000 MG: 500 TABLET, FILM COATED ORAL at 04:27

## 2024-05-26 RX ADMIN — HYDROMORPHONE HYDROCHLORIDE 0.5 MG: 1 INJECTION, SOLUTION INTRAMUSCULAR; INTRAVENOUS; SUBCUTANEOUS at 05:34

## 2024-05-26 RX ADMIN — HYDROMORPHONE HYDROCHLORIDE 0.5 MG: 1 INJECTION, SOLUTION INTRAMUSCULAR; INTRAVENOUS; SUBCUTANEOUS at 08:36

## 2024-05-26 RX ADMIN — SODIUM CHLORIDE, POTASSIUM CHLORIDE, SODIUM LACTATE AND CALCIUM CHLORIDE: 600; 310; 30; 20 INJECTION, SOLUTION INTRAVENOUS at 05:36

## 2024-05-26 RX ADMIN — HYDROMORPHONE HYDROCHLORIDE 0.25 MG: 1 INJECTION, SOLUTION INTRAMUSCULAR; INTRAVENOUS; SUBCUTANEOUS at 20:44

## 2024-05-26 RX ADMIN — HYDROMORPHONE HYDROCHLORIDE 0.5 MG: 1 INJECTION, SOLUTION INTRAMUSCULAR; INTRAVENOUS; SUBCUTANEOUS at 02:16

## 2024-05-26 RX ADMIN — GABAPENTIN 100 MG: 100 CAPSULE ORAL at 04:27

## 2024-05-26 RX ADMIN — SODIUM CHLORIDE, POTASSIUM CHLORIDE, SODIUM LACTATE AND CALCIUM CHLORIDE: 600; 310; 30; 20 INJECTION, SOLUTION INTRAVENOUS at 16:15

## 2024-05-26 RX ADMIN — POLYETHYLENE GLYCOL 3350 1 PACKET: 17 POWDER, FOR SOLUTION ORAL at 16:17

## 2024-05-26 RX ADMIN — METAXALONE 800 MG: 800 TABLET ORAL at 04:27

## 2024-05-26 RX ADMIN — ACETAMINOPHEN 1000 MG: 500 TABLET, FILM COATED ORAL at 12:06

## 2024-05-26 RX ADMIN — CELECOXIB 200 MG: 200 CAPSULE ORAL at 16:18

## 2024-05-26 RX ADMIN — POLYETHYLENE GLYCOL 3350 1 PACKET: 17 POWDER, FOR SOLUTION ORAL at 04:27

## 2024-05-26 RX ADMIN — CELECOXIB 200 MG: 200 CAPSULE ORAL at 04:26

## 2024-05-26 RX ADMIN — DOCUSATE SODIUM 100 MG: 100 CAPSULE, LIQUID FILLED ORAL at 04:27

## 2024-05-26 RX ADMIN — BISACODYL 10 MG: 10 SUPPOSITORY RECTAL at 14:09

## 2024-05-26 RX ADMIN — FAMOTIDINE 20 MG: 20 TABLET, FILM COATED ORAL at 04:27

## 2024-05-26 RX ADMIN — GABAPENTIN 200 MG: 100 CAPSULE ORAL at 20:45

## 2024-05-26 RX ADMIN — ACETAMINOPHEN 1000 MG: 500 TABLET, FILM COATED ORAL at 00:00

## 2024-05-26 RX ADMIN — Medication 1 EACH: at 16:17

## 2024-05-26 RX ADMIN — ACETAMINOPHEN 1000 MG: 500 TABLET, FILM COATED ORAL at 16:18

## 2024-05-26 RX ADMIN — OXYCODONE HYDROCHLORIDE 10 MG: 10 TABLET ORAL at 01:07

## 2024-05-26 RX ADMIN — OXYCODONE HYDROCHLORIDE 10 MG: 10 TABLET ORAL at 07:42

## 2024-05-26 RX ADMIN — GABAPENTIN 200 MG: 100 CAPSULE ORAL at 14:09

## 2024-05-26 RX ADMIN — HYDROMORPHONE HYDROCHLORIDE 0.25 MG: 1 INJECTION, SOLUTION INTRAMUSCULAR; INTRAVENOUS; SUBCUTANEOUS at 16:17

## 2024-05-26 RX ADMIN — FAMOTIDINE 20 MG: 20 TABLET, FILM COATED ORAL at 16:18

## 2024-05-26 RX ADMIN — MAGNESIUM HYDROXIDE 30 ML: 1200 LIQUID ORAL at 08:35

## 2024-05-26 RX ADMIN — OXYCODONE HYDROCHLORIDE 15 MG: 15 TABLET ORAL at 19:32

## 2024-05-26 RX ADMIN — MAGNESIUM HYDROXIDE 30 ML: 1200 LIQUID ORAL at 16:17

## 2024-05-26 RX ADMIN — OXYCODONE HYDROCHLORIDE 15 MG: 15 TABLET ORAL at 22:39

## 2024-05-26 RX ADMIN — OXYCODONE HYDROCHLORIDE 15 MG: 15 TABLET ORAL at 12:06

## 2024-05-26 RX ADMIN — METAXALONE 800 MG: 800 TABLET ORAL at 16:25

## 2024-05-26 RX ADMIN — METAXALONE 800 MG: 800 TABLET ORAL at 12:06

## 2024-05-26 RX ADMIN — ENOXAPARIN SODIUM 40 MG: 100 INJECTION SUBCUTANEOUS at 16:17

## 2024-05-26 RX ADMIN — Medication 1 EACH: at 04:27

## 2024-05-26 RX ADMIN — ENOXAPARIN SODIUM 40 MG: 100 INJECTION SUBCUTANEOUS at 04:27

## 2024-05-26 RX ADMIN — DOCUSATE SODIUM 100 MG: 100 CAPSULE, LIQUID FILLED ORAL at 16:17

## 2024-05-26 RX ADMIN — Medication 1 EACH: at 12:19

## 2024-05-26 ASSESSMENT — ENCOUNTER SYMPTOMS
PSYCHIATRIC NEGATIVE: 1
RESPIRATORY NEGATIVE: 1
NEUROLOGICAL NEGATIVE: 1
MYALGIAS: 1

## 2024-05-26 ASSESSMENT — PAIN DESCRIPTION - PAIN TYPE
TYPE: ACUTE PAIN;SURGICAL PAIN
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
## 2024-05-26 NOTE — PROGRESS NOTES
"    Orthopedic PA Progress Note    Interval changes:  Patient doing well postop  LLE splint and dressings are CDI  NWB BLE  - hinged knee brace locked at 20 degrees to right knee, right foot in boot, left foot in splint  - PFWB LUE  Pending left foot surgery date TBD    ROS - Patient denies any new issues.  Denies any numbness or tingling. Pain well controlled.    /75   Pulse (!) 103   Temp 37.1 °C (98.8 °F) (Temporal)   Resp 17   Ht 1.753 m (5' 9.02\")   Wt 77.1 kg (170 lb)   SpO2 95%     Patient seen and examined  No acute distress  Breathing non labored  RRR  LLE: Splint and dressings CDI. Flexes and extends all toes. Sensation intact . Cap refill <2s.  RLE: knee brace and boot in place. Patient clearly fires tibialis anterior, EHL, and gastrocnemius/soleus. Sensation is intact to light touch throughout superficial peroneal, deep peroneal, tibial, saphenous, and sural nerve distributions. Strong and palpable 2+ dorsalis pedis and posterior tibial pulses with capillary refill less than 2 seconds.  Recent Labs     05/23/24  1058 05/24/24  1023 05/25/24  0658   WBC 14.9* 5.5 7.6   RBC 5.74 4.17* 3.74*   HEMOGLOBIN 16.5 12.2* 10.9*   HEMATOCRIT 48.4 34.3* 30.7*   MCV 84.3 82.3 82.1   MCH 28.7 29.3 29.1   MCHC 34.1 35.6 35.5   RDW 38.8 38.6 38.0   PLATELETCT 344 156* 135*   MPV 10.0 9.8 10.5       Active Hospital Problems    Diagnosis     Closed fracture of right tibial plateau [S82.141A]      Priority: High    Fracture of rib of left side [S22.32XA]      Priority: Medium    Multiple fractures of left foot, closed, initial encounter [S92.902A]      Priority: Medium    Closed left ankle fracture [S82.892A]      Priority: Medium    Sacral fracture, closed (HCC) [S32.10XA]      Priority: Medium    Multiple lacerations [T07.XXXA]      Priority: Medium    Multiple closed fractures of metacarpal bones [S62.309A]      Priority: Medium    Closed fracture of right scapula [S42.101A]      Priority: Medium    Closed " fracture of right ankle [S82.891A]      Priority: Medium    Trauma [T14.90XA]      Priority: Low    No contraindication to deep vein thrombosis (DVT) prophylaxis [Z78.9]      Priority: Low    Compression fracture of T8 vertebra (HCC) [S22.060A]      Priority: Low       Assessment/Plan:  Patient doing well postop  LLE splint and dressings are CDI  NWB BLE  - hinged knee brace locked at 20 degrees to right knee, right foot in boot, left foot in splint  - PFWB LUE  Pending left foot surgery date TBD    POD#1 S/p  1.  Open treatment and internal fixation of left bimalleolar ankle fracture.  2.  Open treatment and internal fixation of left distal tibiofibular joint   disruption.  3.  Excisional debridement of left leg laceration measuring 5x1.5 cm including   skin and subcutaneous tissue.  4.  Layered repair of intermediate complexity laceration measuring 5 cm, left   leg  Wt bearing status - NWB BLE  - hinged knee brace locked at 20 degrees to right knee, right foot in boot, left foot in splint  Wound care/Drains - Dressings to be left in place  Future Procedures - TBD  Lovenox: Start 5/24  Sutures/Staples out- 14-21 days post operatively. Removal will completed by ortho GENIA's unless transferred.  DVT Prophylaxis outpatient: ASA 81 mg PO BID x4 weeks  PT/OT-initiated  Antibiotics:  Perioperative completed  DVT Prophylaxis- TEDS/SCDs/Foot pumps  Campos-not needed per ortho  Case Coordination for Discharge Planning - Disposition per therapy recs.

## 2024-05-26 NOTE — PROGRESS NOTES
Assumed care of patient. A&Ox4, denies Sob but endorses 10/10 pain uncontrolled with current pain medication regimen. MD aware and awaiting orders. All needs met at this time; will continue to monitor.

## 2024-05-26 NOTE — CARE PLAN
The patient is Stable - Low risk of patient condition declining or worsening    Shift Goals  Clinical Goals: Pain management, Bowel management, safety  Patient Goals: Pain control, bowel protocol, comfort  Family Goals: Not present    Progress made toward(s) clinical / shift goals:  yes  Problem: Skin Integrity  Goal: Skin integrity is maintained or improved  Outcome: Progressing     Problem: Fall Risk  Goal: Patient will remain free from falls  Outcome: Progressing     Problem: Pain - Standard  Goal: Alleviation of pain or a reduction in pain to the patient’s comfort goal  Outcome: Progressing       Patient is not progressing towards the following goals:

## 2024-05-26 NOTE — PROGRESS NOTES
Got report from previous nurse. Agreed previous nurse's assessment.  Administered morning meds per MAR.
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
## 2024-05-27 LAB
ANION GAP SERPL CALC-SCNC: 13 MMOL/L (ref 7–16)
ANION GAP SERPL CALC-SCNC: 13 MMOL/L (ref 7–16)
BASOPHILS # BLD AUTO: 0.3 % (ref 0–1.8)
BASOPHILS # BLD AUTO: 0.3 % (ref 0–1.8)
BASOPHILS # BLD: 0.02 K/UL (ref 0–0.12)
BASOPHILS # BLD: 0.02 K/UL (ref 0–0.12)
BUN SERPL-MCNC: 14 MG/DL (ref 8–22)
BUN SERPL-MCNC: 14 MG/DL (ref 8–22)
CALCIUM SERPL-MCNC: 8.3 MG/DL (ref 8.5–10.5)
CALCIUM SERPL-MCNC: 8.3 MG/DL (ref 8.5–10.5)
CHLORIDE SERPL-SCNC: 107 MMOL/L (ref 96–112)
CHLORIDE SERPL-SCNC: 107 MMOL/L (ref 96–112)
CO2 SERPL-SCNC: 19 MMOL/L (ref 20–33)
CO2 SERPL-SCNC: 19 MMOL/L (ref 20–33)
CREAT SERPL-MCNC: 1.01 MG/DL (ref 0.5–1.4)
CREAT SERPL-MCNC: 1.01 MG/DL (ref 0.5–1.4)
EOSINOPHIL # BLD AUTO: 0.15 K/UL (ref 0–0.51)
EOSINOPHIL # BLD AUTO: 0.15 K/UL (ref 0–0.51)
EOSINOPHIL NFR BLD: 2.4 % (ref 0–6.9)
EOSINOPHIL NFR BLD: 2.4 % (ref 0–6.9)
ERYTHROCYTE [DISTWIDTH] IN BLOOD BY AUTOMATED COUNT: 37.3 FL (ref 35.9–50)
ERYTHROCYTE [DISTWIDTH] IN BLOOD BY AUTOMATED COUNT: 37.3 FL (ref 35.9–50)
GFR SERPLBLD CREATININE-BSD FMLA CKD-EPI: 101 ML/MIN/1.73 M 2
GFR SERPLBLD CREATININE-BSD FMLA CKD-EPI: 101 ML/MIN/1.73 M 2
GLUCOSE SERPL-MCNC: 86 MG/DL (ref 65–99)
GLUCOSE SERPL-MCNC: 86 MG/DL (ref 65–99)
HCT VFR BLD AUTO: 29.2 % (ref 42–52)
HCT VFR BLD AUTO: 29.2 % (ref 42–52)
HGB BLD-MCNC: 10.7 G/DL (ref 14–18)
HGB BLD-MCNC: 10.7 G/DL (ref 14–18)
HGB RETIC QN AUTO: 31.2 PG/CELL (ref 29–35)
HGB RETIC QN AUTO: 31.2 PG/CELL (ref 29–35)
IMM GRANULOCYTES # BLD AUTO: 0.06 K/UL (ref 0–0.11)
IMM GRANULOCYTES # BLD AUTO: 0.06 K/UL (ref 0–0.11)
IMM GRANULOCYTES NFR BLD AUTO: 1 % (ref 0–0.9)
IMM GRANULOCYTES NFR BLD AUTO: 1 % (ref 0–0.9)
IMM RETICS NFR: 27.4 % (ref 2.6–16.1)
IMM RETICS NFR: 27.4 % (ref 2.6–16.1)
IRON SATN MFR SERPL: 24 % (ref 15–55)
IRON SATN MFR SERPL: 24 % (ref 15–55)
IRON SERPL-MCNC: 52 UG/DL (ref 50–180)
IRON SERPL-MCNC: 52 UG/DL (ref 50–180)
LYMPHOCYTES # BLD AUTO: 1.55 K/UL (ref 1–4.8)
LYMPHOCYTES # BLD AUTO: 1.55 K/UL (ref 1–4.8)
LYMPHOCYTES NFR BLD: 24.6 % (ref 22–41)
LYMPHOCYTES NFR BLD: 24.6 % (ref 22–41)
MCH RBC QN AUTO: 29.6 PG (ref 27–33)
MCH RBC QN AUTO: 29.6 PG (ref 27–33)
MCHC RBC AUTO-ENTMCNC: 36.6 G/DL (ref 32.3–36.5)
MCHC RBC AUTO-ENTMCNC: 36.6 G/DL (ref 32.3–36.5)
MCV RBC AUTO: 80.9 FL (ref 81.4–97.8)
MCV RBC AUTO: 80.9 FL (ref 81.4–97.8)
MONOCYTES # BLD AUTO: 0.55 K/UL (ref 0–0.85)
MONOCYTES # BLD AUTO: 0.55 K/UL (ref 0–0.85)
MONOCYTES NFR BLD AUTO: 8.7 % (ref 0–13.4)
MONOCYTES NFR BLD AUTO: 8.7 % (ref 0–13.4)
NEUTROPHILS # BLD AUTO: 3.97 K/UL (ref 1.82–7.42)
NEUTROPHILS # BLD AUTO: 3.97 K/UL (ref 1.82–7.42)
NEUTROPHILS NFR BLD: 63 % (ref 44–72)
NEUTROPHILS NFR BLD: 63 % (ref 44–72)
NRBC # BLD AUTO: 0 K/UL
NRBC # BLD AUTO: 0 K/UL
NRBC BLD-RTO: 0 /100 WBC (ref 0–0.2)
NRBC BLD-RTO: 0 /100 WBC (ref 0–0.2)
PLATELET # BLD AUTO: 181 K/UL (ref 164–446)
PLATELET # BLD AUTO: 181 K/UL (ref 164–446)
PMV BLD AUTO: 10.5 FL (ref 9–12.9)
PMV BLD AUTO: 10.5 FL (ref 9–12.9)
POTASSIUM SERPL-SCNC: 4.2 MMOL/L (ref 3.6–5.5)
POTASSIUM SERPL-SCNC: 4.2 MMOL/L (ref 3.6–5.5)
RBC # BLD AUTO: 3.61 M/UL (ref 4.7–6.1)
RBC # BLD AUTO: 3.61 M/UL (ref 4.7–6.1)
RETICS # AUTO: 0.1 M/UL (ref 0.04–0.12)
RETICS # AUTO: 0.1 M/UL (ref 0.04–0.12)
RETICS/RBC NFR: 2.7 % (ref 0.8–2.6)
RETICS/RBC NFR: 2.7 % (ref 0.8–2.6)
SODIUM SERPL-SCNC: 139 MMOL/L (ref 135–145)
SODIUM SERPL-SCNC: 139 MMOL/L (ref 135–145)
TIBC SERPL-MCNC: 214 UG/DL (ref 250–450)
TIBC SERPL-MCNC: 214 UG/DL (ref 250–450)
UIBC SERPL-MCNC: 162 UG/DL (ref 110–370)
UIBC SERPL-MCNC: 162 UG/DL (ref 110–370)
WBC # BLD AUTO: 6.3 K/UL (ref 4.8–10.8)
WBC # BLD AUTO: 6.3 K/UL (ref 4.8–10.8)

## 2024-05-27 PROCEDURE — 99232 SBSQ HOSP IP/OBS MODERATE 35: CPT | Performed by: PHYSICIAN ASSISTANT

## 2024-05-27 PROCEDURE — 97535 SELF CARE MNGMENT TRAINING: CPT

## 2024-05-27 PROCEDURE — 770001 HCHG ROOM/CARE - MED/SURG/GYN PRIV*

## 2024-05-27 PROCEDURE — 97163 PT EVAL HIGH COMPLEX 45 MIN: CPT

## 2024-05-27 PROCEDURE — 97166 OT EVAL MOD COMPLEX 45 MIN: CPT

## 2024-05-27 PROCEDURE — 36415 COLL VENOUS BLD VENIPUNCTURE: CPT

## 2024-05-27 PROCEDURE — 51798 US URINE CAPACITY MEASURE: CPT

## 2024-05-27 PROCEDURE — A9270 NON-COVERED ITEM OR SERVICE: HCPCS

## 2024-05-27 PROCEDURE — 80048 BASIC METABOLIC PNL TOTAL CA: CPT

## 2024-05-27 PROCEDURE — 700111 HCHG RX REV CODE 636 W/ 250 OVERRIDE (IP): Mod: JZ | Performed by: PHYSICIAN ASSISTANT

## 2024-05-27 PROCEDURE — 700102 HCHG RX REV CODE 250 W/ 637 OVERRIDE(OP)

## 2024-05-27 PROCEDURE — 85046 RETICYTE/HGB CONCENTRATE: CPT

## 2024-05-27 PROCEDURE — A9270 NON-COVERED ITEM OR SERVICE: HCPCS | Performed by: PHYSICIAN ASSISTANT

## 2024-05-27 PROCEDURE — 700101 HCHG RX REV CODE 250

## 2024-05-27 PROCEDURE — 700105 HCHG RX REV CODE 258: Performed by: PHYSICIAN ASSISTANT

## 2024-05-27 PROCEDURE — 83540 ASSAY OF IRON: CPT

## 2024-05-27 PROCEDURE — 700102 HCHG RX REV CODE 250 W/ 637 OVERRIDE(OP): Performed by: PHYSICIAN ASSISTANT

## 2024-05-27 PROCEDURE — 85025 COMPLETE CBC W/AUTO DIFF WBC: CPT

## 2024-05-27 PROCEDURE — 700105 HCHG RX REV CODE 258

## 2024-05-27 PROCEDURE — 83550 IRON BINDING TEST: CPT

## 2024-05-27 RX ADMIN — HYDROMORPHONE HYDROCHLORIDE 0.25 MG: 1 INJECTION, SOLUTION INTRAMUSCULAR; INTRAVENOUS; SUBCUTANEOUS at 14:19

## 2024-05-27 RX ADMIN — ACETAMINOPHEN 1000 MG: 500 TABLET, FILM COATED ORAL at 13:09

## 2024-05-27 RX ADMIN — SENNOSIDES AND DOCUSATE SODIUM 1 TABLET: 50; 8.6 TABLET ORAL at 22:04

## 2024-05-27 RX ADMIN — ENOXAPARIN SODIUM 40 MG: 100 INJECTION SUBCUTANEOUS at 17:32

## 2024-05-27 RX ADMIN — Medication 1 EACH: at 17:33

## 2024-05-27 RX ADMIN — HYDROMORPHONE HYDROCHLORIDE 0.25 MG: 1 INJECTION, SOLUTION INTRAMUSCULAR; INTRAVENOUS; SUBCUTANEOUS at 00:51

## 2024-05-27 RX ADMIN — HYDROMORPHONE HYDROCHLORIDE 0.25 MG: 1 INJECTION, SOLUTION INTRAMUSCULAR; INTRAVENOUS; SUBCUTANEOUS at 05:03

## 2024-05-27 RX ADMIN — CELECOXIB 200 MG: 200 CAPSULE ORAL at 05:12

## 2024-05-27 RX ADMIN — OXYCODONE HYDROCHLORIDE 15 MG: 15 TABLET ORAL at 13:09

## 2024-05-27 RX ADMIN — HYDROMORPHONE HYDROCHLORIDE 0.25 MG: 1 INJECTION, SOLUTION INTRAMUSCULAR; INTRAVENOUS; SUBCUTANEOUS at 23:14

## 2024-05-27 RX ADMIN — METAXALONE 800 MG: 800 TABLET ORAL at 05:12

## 2024-05-27 RX ADMIN — Medication 1 EACH: at 05:12

## 2024-05-27 RX ADMIN — SODIUM CHLORIDE, POTASSIUM CHLORIDE, SODIUM LACTATE AND CALCIUM CHLORIDE: 600; 310; 30; 20 INJECTION, SOLUTION INTRAVENOUS at 02:37

## 2024-05-27 RX ADMIN — CELECOXIB 200 MG: 200 CAPSULE ORAL at 17:32

## 2024-05-27 RX ADMIN — GABAPENTIN 200 MG: 100 CAPSULE ORAL at 13:09

## 2024-05-27 RX ADMIN — OXYCODONE HYDROCHLORIDE 15 MG: 15 TABLET ORAL at 22:04

## 2024-05-27 RX ADMIN — OXYCODONE HYDROCHLORIDE 15 MG: 15 TABLET ORAL at 17:32

## 2024-05-27 RX ADMIN — OXYCODONE HYDROCHLORIDE 15 MG: 15 TABLET ORAL at 02:31

## 2024-05-27 RX ADMIN — METAXALONE 800 MG: 800 TABLET ORAL at 13:09

## 2024-05-27 RX ADMIN — GABAPENTIN 200 MG: 100 CAPSULE ORAL at 22:04

## 2024-05-27 RX ADMIN — ACETAMINOPHEN 1000 MG: 500 TABLET, FILM COATED ORAL at 23:14

## 2024-05-27 RX ADMIN — FAMOTIDINE 20 MG: 20 TABLET, FILM COATED ORAL at 17:32

## 2024-05-27 RX ADMIN — ENOXAPARIN SODIUM 40 MG: 100 INJECTION SUBCUTANEOUS at 05:11

## 2024-05-27 RX ADMIN — HYDROMORPHONE HYDROCHLORIDE 0.25 MG: 1 INJECTION, SOLUTION INTRAMUSCULAR; INTRAVENOUS; SUBCUTANEOUS at 08:29

## 2024-05-27 RX ADMIN — GABAPENTIN 200 MG: 100 CAPSULE ORAL at 05:12

## 2024-05-27 RX ADMIN — FAMOTIDINE 20 MG: 20 TABLET, FILM COATED ORAL at 05:12

## 2024-05-27 RX ADMIN — METAXALONE 800 MG: 800 TABLET ORAL at 17:32

## 2024-05-27 RX ADMIN — ACETAMINOPHEN 1000 MG: 500 TABLET, FILM COATED ORAL at 05:12

## 2024-05-27 RX ADMIN — ACETAMINOPHEN 1000 MG: 500 TABLET, FILM COATED ORAL at 17:31

## 2024-05-27 RX ADMIN — SODIUM CHLORIDE 250 MG: 9 INJECTION, SOLUTION INTRAVENOUS at 17:33

## 2024-05-27 RX ADMIN — Medication 1 EACH: at 13:09

## 2024-05-27 ASSESSMENT — COGNITIVE AND FUNCTIONAL STATUS - GENERAL
MOVING FROM LYING ON BACK TO SITTING ON SIDE OF FLAT BED: A LITTLE
TOILETING: A LOT
PERSONAL GROOMING: A LITTLE
SUGGESTED CMS G CODE MODIFIER DAILY ACTIVITY: CK
MOBILITY SCORE: 12
HELP NEEDED FOR BATHING: A LOT
EATING MEALS: A LITTLE
DRESSING REGULAR UPPER BODY CLOTHING: A LITTLE
MOVING TO AND FROM BED TO CHAIR: A LITTLE
DRESSING REGULAR LOWER BODY CLOTHING: A LOT
TURNING FROM BACK TO SIDE WHILE IN FLAT BAD: A LITTLE
SUGGESTED CMS G CODE MODIFIER MOBILITY: CL
CLIMB 3 TO 5 STEPS WITH RAILING: TOTAL
STANDING UP FROM CHAIR USING ARMS: TOTAL
WALKING IN HOSPITAL ROOM: TOTAL
DAILY ACTIVITIY SCORE: 15

## 2024-05-27 ASSESSMENT — ENCOUNTER SYMPTOMS
PSYCHIATRIC NEGATIVE: 1
VOMITING: 0
CONSTIPATION: 0
NEUROLOGICAL NEGATIVE: 1
MYALGIAS: 1
ABDOMINAL PAIN: 0
RESPIRATORY NEGATIVE: 1
ROS GI COMMENTS: BM 5/26
NAUSEA: 0

## 2024-05-27 ASSESSMENT — PAIN DESCRIPTION - PAIN TYPE
TYPE: ACUTE PAIN;SURGICAL PAIN
TYPE: SURGICAL PAIN;ACUTE PAIN
TYPE: ACUTE PAIN;SURGICAL PAIN
TYPE: ACUTE PAIN;SURGICAL PAIN
TYPE: SURGICAL PAIN;ACUTE PAIN
TYPE: ACUTE PAIN;SURGICAL PAIN
TYPE: ACUTE PAIN;SURGICAL PAIN

## 2024-05-27 ASSESSMENT — ACTIVITIES OF DAILY LIVING (ADL): TOILETING: INDEPENDENT

## 2024-05-27 ASSESSMENT — GAIT ASSESSMENTS: GAIT LEVEL OF ASSIST: UNABLE TO PARTICIPATE
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
## 2024-05-27 NOTE — CARE PLAN
Problem: Knowledge Deficit - Standard  Goal: Patient and family/care givers will demonstrate understanding of plan of care, disease process/condition, diagnostic tests and medications  Outcome: Progressing   Pt educated on mobility, POC and pending poss surgery. Pt verbalizes understanding and agrees with plan.  Problem: Fall Risk  Goal: Patient will remain free from falls  Outcome: Progressing   Fall precautions in place. Pt educated to call prior to getting out of bed. Call light and personal belongings within reach.   Problem: Pain - Standard  Goal: Alleviation of pain or a reduction in pain to the patient’s comfort goal  Outcome: Progressing   The patient is Stable - Low risk of patient condition declining or worsening    Shift Goals  Clinical Goals: pain control. safety  Patient Goals: pain control, comfort  Family Goals: NA    Progress made toward(s) clinical / shift goals:  Pt free from falls. Pt verbalizes understanding of plan of care.    Patient is not progressing towards the following goals:

## 2024-05-27 NOTE — CARE PLAN
The patient is Stable - Low risk of patient condition declining or worsening    Shift Goals  Clinical Goals: pain control. safety  Patient Goals: pain control, comfort  Family Goals: NA    Progress made toward(s) clinical / shift goals:  yes  Problem: Skin Integrity  Goal: Skin integrity is maintained or improved  Outcome: Progressing     Problem: Fall Risk  Goal: Patient will remain free from falls  Outcome: Progressing     Problem: Pain - Standard  Goal: Alleviation of pain or a reduction in pain to the patient’s comfort goal  Outcome: Progressing       Patient is not progressing towards the following goals:

## 2024-05-27 NOTE — PROGRESS NOTES
Patient A&OX4. Patient stated pain. Meds administered per MAR as well as pian med.  Campos in place.  Water and call light within reach.
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
## 2024-05-27 NOTE — PROGRESS NOTES
Verbal order received by Komal HARRISON, to remove delaney catheter. NAP removed with Taisha ROGEL. Patient tolerated well.
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
## 2024-05-28 PROBLEM — Z75.8 DISCHARGE PLANNING ISSUES: Status: ACTIVE | Noted: 2024-05-28

## 2024-05-28 LAB
ANION GAP SERPL CALC-SCNC: 13 MMOL/L (ref 7–16)
ANION GAP SERPL CALC-SCNC: 13 MMOL/L (ref 7–16)
BASOPHILS # BLD AUTO: 0.3 % (ref 0–1.8)
BASOPHILS # BLD AUTO: 0.3 % (ref 0–1.8)
BASOPHILS # BLD: 0.02 K/UL (ref 0–0.12)
BASOPHILS # BLD: 0.02 K/UL (ref 0–0.12)
BUN SERPL-MCNC: 16 MG/DL (ref 8–22)
BUN SERPL-MCNC: 16 MG/DL (ref 8–22)
CALCIUM SERPL-MCNC: 8.6 MG/DL (ref 8.5–10.5)
CALCIUM SERPL-MCNC: 8.6 MG/DL (ref 8.5–10.5)
CHLORIDE SERPL-SCNC: 108 MMOL/L (ref 96–112)
CHLORIDE SERPL-SCNC: 108 MMOL/L (ref 96–112)
CO2 SERPL-SCNC: 19 MMOL/L (ref 20–33)
CO2 SERPL-SCNC: 19 MMOL/L (ref 20–33)
CREAT SERPL-MCNC: 1.02 MG/DL (ref 0.5–1.4)
CREAT SERPL-MCNC: 1.02 MG/DL (ref 0.5–1.4)
EOSINOPHIL # BLD AUTO: 0.16 K/UL (ref 0–0.51)
EOSINOPHIL # BLD AUTO: 0.16 K/UL (ref 0–0.51)
EOSINOPHIL NFR BLD: 2.6 % (ref 0–6.9)
EOSINOPHIL NFR BLD: 2.6 % (ref 0–6.9)
ERYTHROCYTE [DISTWIDTH] IN BLOOD BY AUTOMATED COUNT: 37.4 FL (ref 35.9–50)
ERYTHROCYTE [DISTWIDTH] IN BLOOD BY AUTOMATED COUNT: 37.4 FL (ref 35.9–50)
GFR SERPLBLD CREATININE-BSD FMLA CKD-EPI: 100 ML/MIN/1.73 M 2
GFR SERPLBLD CREATININE-BSD FMLA CKD-EPI: 100 ML/MIN/1.73 M 2
GLUCOSE SERPL-MCNC: 102 MG/DL (ref 65–99)
GLUCOSE SERPL-MCNC: 102 MG/DL (ref 65–99)
HCT VFR BLD AUTO: 27.9 % (ref 42–52)
HCT VFR BLD AUTO: 27.9 % (ref 42–52)
HGB BLD-MCNC: 9.9 G/DL (ref 14–18)
HGB BLD-MCNC: 9.9 G/DL (ref 14–18)
IMM GRANULOCYTES # BLD AUTO: 0.08 K/UL (ref 0–0.11)
IMM GRANULOCYTES # BLD AUTO: 0.08 K/UL (ref 0–0.11)
IMM GRANULOCYTES NFR BLD AUTO: 1.3 % (ref 0–0.9)
IMM GRANULOCYTES NFR BLD AUTO: 1.3 % (ref 0–0.9)
LYMPHOCYTES # BLD AUTO: 1.35 K/UL (ref 1–4.8)
LYMPHOCYTES # BLD AUTO: 1.35 K/UL (ref 1–4.8)
LYMPHOCYTES NFR BLD: 22.3 % (ref 22–41)
LYMPHOCYTES NFR BLD: 22.3 % (ref 22–41)
MCH RBC QN AUTO: 28.9 PG (ref 27–33)
MCH RBC QN AUTO: 28.9 PG (ref 27–33)
MCHC RBC AUTO-ENTMCNC: 35.5 G/DL (ref 32.3–36.5)
MCHC RBC AUTO-ENTMCNC: 35.5 G/DL (ref 32.3–36.5)
MCV RBC AUTO: 81.6 FL (ref 81.4–97.8)
MCV RBC AUTO: 81.6 FL (ref 81.4–97.8)
MONOCYTES # BLD AUTO: 0.52 K/UL (ref 0–0.85)
MONOCYTES # BLD AUTO: 0.52 K/UL (ref 0–0.85)
MONOCYTES NFR BLD AUTO: 8.6 % (ref 0–13.4)
MONOCYTES NFR BLD AUTO: 8.6 % (ref 0–13.4)
NEUTROPHILS # BLD AUTO: 3.92 K/UL (ref 1.82–7.42)
NEUTROPHILS # BLD AUTO: 3.92 K/UL (ref 1.82–7.42)
NEUTROPHILS NFR BLD: 64.9 % (ref 44–72)
NEUTROPHILS NFR BLD: 64.9 % (ref 44–72)
NRBC # BLD AUTO: 0.02 K/UL
NRBC # BLD AUTO: 0.02 K/UL
NRBC BLD-RTO: 0.3 /100 WBC (ref 0–0.2)
NRBC BLD-RTO: 0.3 /100 WBC (ref 0–0.2)
PLATELET # BLD AUTO: 213 K/UL (ref 164–446)
PLATELET # BLD AUTO: 213 K/UL (ref 164–446)
PMV BLD AUTO: 10.2 FL (ref 9–12.9)
PMV BLD AUTO: 10.2 FL (ref 9–12.9)
POTASSIUM SERPL-SCNC: 3.9 MMOL/L (ref 3.6–5.5)
POTASSIUM SERPL-SCNC: 3.9 MMOL/L (ref 3.6–5.5)
RBC # BLD AUTO: 3.42 M/UL (ref 4.7–6.1)
RBC # BLD AUTO: 3.42 M/UL (ref 4.7–6.1)
SODIUM SERPL-SCNC: 140 MMOL/L (ref 135–145)
SODIUM SERPL-SCNC: 140 MMOL/L (ref 135–145)
WBC # BLD AUTO: 6.1 K/UL (ref 4.8–10.8)
WBC # BLD AUTO: 6.1 K/UL (ref 4.8–10.8)

## 2024-05-28 PROCEDURE — 700111 HCHG RX REV CODE 636 W/ 250 OVERRIDE (IP): Performed by: PHYSICIAN ASSISTANT

## 2024-05-28 PROCEDURE — 36415 COLL VENOUS BLD VENIPUNCTURE: CPT

## 2024-05-28 PROCEDURE — 700102 HCHG RX REV CODE 250 W/ 637 OVERRIDE(OP)

## 2024-05-28 PROCEDURE — A9270 NON-COVERED ITEM OR SERVICE: HCPCS

## 2024-05-28 PROCEDURE — 700102 HCHG RX REV CODE 250 W/ 637 OVERRIDE(OP): Performed by: PHYSICIAN ASSISTANT

## 2024-05-28 PROCEDURE — 700111 HCHG RX REV CODE 636 W/ 250 OVERRIDE (IP): Mod: JZ | Performed by: PHYSICIAN ASSISTANT

## 2024-05-28 PROCEDURE — 700101 HCHG RX REV CODE 250

## 2024-05-28 PROCEDURE — 770001 HCHG ROOM/CARE - MED/SURG/GYN PRIV*

## 2024-05-28 PROCEDURE — A9270 NON-COVERED ITEM OR SERVICE: HCPCS | Performed by: PHYSICIAN ASSISTANT

## 2024-05-28 PROCEDURE — 700105 HCHG RX REV CODE 258: Performed by: PHYSICIAN ASSISTANT

## 2024-05-28 PROCEDURE — 85025 COMPLETE CBC W/AUTO DIFF WBC: CPT

## 2024-05-28 PROCEDURE — 99999 PR NO CHARGE: CPT | Performed by: SURGERY

## 2024-05-28 PROCEDURE — 80048 BASIC METABOLIC PNL TOTAL CA: CPT

## 2024-05-28 PROCEDURE — 99232 SBSQ HOSP IP/OBS MODERATE 35: CPT

## 2024-05-28 PROCEDURE — 97530 THERAPEUTIC ACTIVITIES: CPT | Mod: CQ

## 2024-05-28 RX ORDER — HYDROMORPHONE HYDROCHLORIDE 1 MG/ML
0.25 INJECTION, SOLUTION INTRAMUSCULAR; INTRAVENOUS; SUBCUTANEOUS EVERY 6 HOURS PRN
Status: DISCONTINUED | OUTPATIENT
Start: 2024-05-28 | End: 2024-06-08

## 2024-05-28 RX ORDER — OXYCODONE HYDROCHLORIDE 15 MG/1
15 TABLET ORAL
Status: DISCONTINUED | OUTPATIENT
Start: 2024-05-28 | End: 2024-06-08

## 2024-05-28 RX ORDER — OXYCODONE HYDROCHLORIDE 10 MG/1
10 TABLET ORAL
Status: DISCONTINUED | OUTPATIENT
Start: 2024-05-28 | End: 2024-06-08

## 2024-05-28 RX ADMIN — GABAPENTIN 200 MG: 100 CAPSULE ORAL at 14:13

## 2024-05-28 RX ADMIN — METAXALONE 800 MG: 800 TABLET ORAL at 05:40

## 2024-05-28 RX ADMIN — ACETAMINOPHEN 1000 MG: 500 TABLET, FILM COATED ORAL at 04:28

## 2024-05-28 RX ADMIN — HYDROMORPHONE HYDROCHLORIDE 0.25 MG: 1 INJECTION, SOLUTION INTRAMUSCULAR; INTRAVENOUS; SUBCUTANEOUS at 05:40

## 2024-05-28 RX ADMIN — OXYCODONE HYDROCHLORIDE 15 MG: 15 TABLET ORAL at 04:28

## 2024-05-28 RX ADMIN — FAMOTIDINE 20 MG: 20 TABLET, FILM COATED ORAL at 04:28

## 2024-05-28 RX ADMIN — DOCUSATE SODIUM 100 MG: 100 CAPSULE, LIQUID FILLED ORAL at 16:47

## 2024-05-28 RX ADMIN — METAXALONE 800 MG: 800 TABLET ORAL at 14:13

## 2024-05-28 RX ADMIN — METAXALONE 800 MG: 800 TABLET ORAL at 16:47

## 2024-05-28 RX ADMIN — Medication 1 EACH: at 14:16

## 2024-05-28 RX ADMIN — POLYETHYLENE GLYCOL 3350 1 PACKET: 17 POWDER, FOR SOLUTION ORAL at 16:47

## 2024-05-28 RX ADMIN — SENNOSIDES AND DOCUSATE SODIUM 1 TABLET: 50; 8.6 TABLET ORAL at 21:44

## 2024-05-28 RX ADMIN — GABAPENTIN 200 MG: 100 CAPSULE ORAL at 21:43

## 2024-05-28 RX ADMIN — CELECOXIB 200 MG: 200 CAPSULE ORAL at 04:28

## 2024-05-28 RX ADMIN — MAGNESIUM HYDROXIDE 30 ML: 1200 LIQUID ORAL at 16:47

## 2024-05-28 RX ADMIN — ENOXAPARIN SODIUM 40 MG: 100 INJECTION SUBCUTANEOUS at 04:27

## 2024-05-28 RX ADMIN — SODIUM CHLORIDE 250 MG: 9 INJECTION, SOLUTION INTRAVENOUS at 05:45

## 2024-05-28 RX ADMIN — GABAPENTIN 200 MG: 100 CAPSULE ORAL at 04:28

## 2024-05-28 RX ADMIN — OXYCODONE HYDROCHLORIDE 15 MG: 15 TABLET ORAL at 16:51

## 2024-05-28 RX ADMIN — Medication 1 EACH: at 16:47

## 2024-05-28 RX ADMIN — OXYCODONE HYDROCHLORIDE 15 MG: 15 TABLET ORAL at 08:36

## 2024-05-28 RX ADMIN — Medication 1 EACH: at 04:28

## 2024-05-28 RX ADMIN — OXYCODONE HYDROCHLORIDE 15 MG: 15 TABLET ORAL at 21:43

## 2024-05-28 RX ADMIN — FAMOTIDINE 20 MG: 20 TABLET, FILM COATED ORAL at 16:47

## 2024-05-28 RX ADMIN — ENOXAPARIN SODIUM 40 MG: 100 INJECTION SUBCUTANEOUS at 16:47

## 2024-05-28 ASSESSMENT — ENCOUNTER SYMPTOMS
ROS GI COMMENTS: BM 5/27
VOMITING: 0
MYALGIAS: 1
NAUSEA: 0
ABDOMINAL PAIN: 0

## 2024-05-28 ASSESSMENT — GAIT ASSESSMENTS: GAIT LEVEL OF ASSIST: UNABLE TO PARTICIPATE

## 2024-05-28 ASSESSMENT — COGNITIVE AND FUNCTIONAL STATUS - GENERAL
CLIMB 3 TO 5 STEPS WITH RAILING: TOTAL
WALKING IN HOSPITAL ROOM: TOTAL
SUGGESTED CMS G CODE MODIFIER MOBILITY: CL
MOBILITY SCORE: 12
STANDING UP FROM CHAIR USING ARMS: TOTAL
MOVING TO AND FROM BED TO CHAIR: A LITTLE
MOVING FROM LYING ON BACK TO SITTING ON SIDE OF FLAT BED: A LITTLE
TURNING FROM BACK TO SIDE WHILE IN FLAT BAD: A LITTLE

## 2024-05-28 ASSESSMENT — PAIN DESCRIPTION - PAIN TYPE
TYPE: SURGICAL PAIN;ACUTE PAIN
TYPE: ACUTE PAIN;SURGICAL PAIN

## 2024-05-28 NOTE — CARE PLAN
The patient is Stable - Low risk of patient condition declining or worsening    Shift Goals  Clinical Goals: pain control, safety  Patient Goals: pain control, rest  Family Goals: not present    Problem: Fall Risk  Goal: Patient will remain free from falls  Outcome: Progressing     Problem: Pain - Standard  Goal: Alleviation of pain or a reduction in pain to the patient’s comfort goal  Outcome: Progressing     Progress made toward(s) clinical / shift goals:  Pt was given pain meds per MAR and rested to help alleviate pain. Pt calls appropriately for assistance and fall risk sign in place to decrease risk of falls.

## 2024-05-28 NOTE — THERAPY
"Physical Therapy   Daily Treatment     Patient Name: Hayden Manzanares II  Age:  33 y.o., Sex:  male  Medical Record #: [id number]  Today's Date: 5/28/2024     Precautions  Precautions: Fall Risk;Non Weight Bearing Right Lower Extremity;Non Weight Bearing Left Lower Extremity;Platform Weight Bearing Left Upper Extremity;Weight Bearing As Tolerated Right Upper Extremity;Immobilizer Right Lower Extremity  Comments: R HKB at 20 deg, R CAM boot    Assessment    Pt greeted and seen for PT treatment. Pt did not need assist for bed mobility from flat HOB. Pt was then able to scoot laterally into a WC w/ SBA, Lupis for WC set up. Pt was able to self propel in hallway 250ft w/ SPV. Pt reported that family is able to provide assist at their home. Pt currently limited by orthopedic weight bearing precautions which negatively impacts functional mobility. Pt will continue to benefit from skilled PT to address deficits.       Plan    Treatment Plan Status: Continue Current Treatment Plan  Type of Treatment: Bed Mobility, Equipment, Family / Caregiver Training, Neuro Re-Education / Balance, Self Care / Home Evaluation, Therapeutic Activities, Therapeutic Exercise  Treatment Frequency: 4 Times per Week  Treatment Duration: Until Therapy Goals Met    DC Equipment Recommendations:  (18\" wheelchair with elevating leg rests and removable arm rests, 36\" slide board)  Discharge Recommendations:  (Currently would recommend placement for further rehab, may progress to home with parents if they are able to provide level of assist pt needs)       05/28/24 1204   Cognition    Comments pleasant and cooperative   Balance   Sitting Balance (Static) Fair   Sitting Balance (Dynamic) Fair   Weight Shift Sitting Fair   Skilled Intervention Verbal Cuing;Compensatory Strategies;Sequencing;Tactile Cuing   Comments lateral transfer to chair   Bed Mobility    Supine to Sit Supervised   Scooting Supervised   Skilled Intervention Verbal Cuing   Comments flat HOB, " pt able to sit up and scoot self to EOB w/ R UE   Gait Analysis   Gait Level Of Assist Unable to Participate   Functional Mobility   Sit to Stand Unable to Participate   Bed, Chair, Wheelchair Transfer Standby Assist   Transfer Method Sit Pivot   Mobility bed>WC   Wheelchair Assist Minimal Assist   Distance Wheelchair (Feet or Distance) 250  (w/ SPV)   Skilled Intervention Verbal Cuing;Tactile Cuing;Sequencing;Compensatory Strategies   Comments Pt needed assist for WC set up prior to transfer. Maintained WB with lateral scooting transfer into chair. Able to self propel 250 w/ FWW and B UE, SPV.   Short Term Goals    Short Term Goal # 1 Pt will perform bed mobility from a flat bed with supervision to progress to home in 6 visits.   Goal Outcome # 1 Goal met   Short Term Goal # 2 Pt will transfer via slide board to the wheelchair with supervision to progress independence in 6 visits.   Goal Outcome # 2 Progressing as expected   Short Term Goal # 3 Pt will propel manual wheelchair 100ft with supervision for short household distances in 6 visits.   Goal Outcome # 3 Goal met   Supervising Physical Therapist (PTA Treatments Only)   Supervising Physical Therapist Macy Bee

## 2024-05-28 NOTE — PROGRESS NOTES
Bedside report received, Assume care.     A&O x 4, Able to make needs known.  Continuous [place] monitoring in place.      Bed in low position and locked, pt resting comfortably now, call light with in reach, all needs met at this time. Interventions will be executed per plan of care.
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
## 2024-05-28 NOTE — ASSESSMENT & PLAN NOTE
Date of admission: 5/23/2024.  5/28 Rehab referral 5/28 SNF referral. Spoke with patient. He prefers to go home with his parents.  Cleared for discharge: No.  Rehab consults while waiting for soft tissue swelling to resolve.   Discharge delayed: No.  Discharge date: tbd.

## 2024-05-29 PROBLEM — Z00.8 EVALUATION BY PSYCHIATRIC SERVICE REQUIRED: Status: ACTIVE | Noted: 2024-05-29

## 2024-05-29 LAB
ANION GAP SERPL CALC-SCNC: 12 MMOL/L (ref 7–16)
ANION GAP SERPL CALC-SCNC: 12 MMOL/L (ref 7–16)
BASOPHILS # BLD AUTO: 0.5 % (ref 0–1.8)
BASOPHILS # BLD AUTO: 0.5 % (ref 0–1.8)
BASOPHILS # BLD: 0.05 K/UL (ref 0–0.12)
BASOPHILS # BLD: 0.05 K/UL (ref 0–0.12)
BUN SERPL-MCNC: 14 MG/DL (ref 8–22)
BUN SERPL-MCNC: 14 MG/DL (ref 8–22)
CALCIUM SERPL-MCNC: 8.6 MG/DL (ref 8.5–10.5)
CALCIUM SERPL-MCNC: 8.6 MG/DL (ref 8.5–10.5)
CHLORIDE SERPL-SCNC: 107 MMOL/L (ref 96–112)
CHLORIDE SERPL-SCNC: 107 MMOL/L (ref 96–112)
CO2 SERPL-SCNC: 18 MMOL/L (ref 20–33)
CO2 SERPL-SCNC: 18 MMOL/L (ref 20–33)
CREAT SERPL-MCNC: 0.9 MG/DL (ref 0.5–1.4)
CREAT SERPL-MCNC: 0.9 MG/DL (ref 0.5–1.4)
EOSINOPHIL # BLD AUTO: 0.27 K/UL (ref 0–0.51)
EOSINOPHIL # BLD AUTO: 0.27 K/UL (ref 0–0.51)
EOSINOPHIL NFR BLD: 2.8 % (ref 0–6.9)
EOSINOPHIL NFR BLD: 2.8 % (ref 0–6.9)
ERYTHROCYTE [DISTWIDTH] IN BLOOD BY AUTOMATED COUNT: 41.7 FL (ref 35.9–50)
ERYTHROCYTE [DISTWIDTH] IN BLOOD BY AUTOMATED COUNT: 41.7 FL (ref 35.9–50)
GFR SERPLBLD CREATININE-BSD FMLA CKD-EPI: 116 ML/MIN/1.73 M 2
GFR SERPLBLD CREATININE-BSD FMLA CKD-EPI: 116 ML/MIN/1.73 M 2
GLUCOSE SERPL-MCNC: 96 MG/DL (ref 65–99)
GLUCOSE SERPL-MCNC: 96 MG/DL (ref 65–99)
HCT VFR BLD AUTO: 31.7 % (ref 42–52)
HCT VFR BLD AUTO: 31.7 % (ref 42–52)
HGB BLD-MCNC: 10.5 G/DL (ref 14–18)
HGB BLD-MCNC: 10.5 G/DL (ref 14–18)
IMM GRANULOCYTES # BLD AUTO: 0.34 K/UL (ref 0–0.11)
IMM GRANULOCYTES # BLD AUTO: 0.34 K/UL (ref 0–0.11)
IMM GRANULOCYTES NFR BLD AUTO: 3.5 % (ref 0–0.9)
IMM GRANULOCYTES NFR BLD AUTO: 3.5 % (ref 0–0.9)
LYMPHOCYTES # BLD AUTO: 2.06 K/UL (ref 1–4.8)
LYMPHOCYTES # BLD AUTO: 2.06 K/UL (ref 1–4.8)
LYMPHOCYTES NFR BLD: 21.3 % (ref 22–41)
LYMPHOCYTES NFR BLD: 21.3 % (ref 22–41)
MCH RBC QN AUTO: 29 PG (ref 27–33)
MCH RBC QN AUTO: 29 PG (ref 27–33)
MCHC RBC AUTO-ENTMCNC: 33.1 G/DL (ref 32.3–36.5)
MCHC RBC AUTO-ENTMCNC: 33.1 G/DL (ref 32.3–36.5)
MCV RBC AUTO: 87.6 FL (ref 81.4–97.8)
MCV RBC AUTO: 87.6 FL (ref 81.4–97.8)
MONOCYTES # BLD AUTO: 1.07 K/UL (ref 0–0.85)
MONOCYTES # BLD AUTO: 1.07 K/UL (ref 0–0.85)
MONOCYTES NFR BLD AUTO: 11.1 % (ref 0–13.4)
MONOCYTES NFR BLD AUTO: 11.1 % (ref 0–13.4)
NEUTROPHILS # BLD AUTO: 5.86 K/UL (ref 1.82–7.42)
NEUTROPHILS # BLD AUTO: 5.86 K/UL (ref 1.82–7.42)
NEUTROPHILS NFR BLD: 60.8 % (ref 44–72)
NEUTROPHILS NFR BLD: 60.8 % (ref 44–72)
NRBC # BLD AUTO: 0 K/UL
NRBC # BLD AUTO: 0 K/UL
NRBC BLD-RTO: 0 /100 WBC (ref 0–0.2)
NRBC BLD-RTO: 0 /100 WBC (ref 0–0.2)
PLATELET # BLD AUTO: 188 K/UL (ref 164–446)
PLATELET # BLD AUTO: 188 K/UL (ref 164–446)
PMV BLD AUTO: 10.6 FL (ref 9–12.9)
PMV BLD AUTO: 10.6 FL (ref 9–12.9)
POTASSIUM SERPL-SCNC: 4.1 MMOL/L (ref 3.6–5.5)
POTASSIUM SERPL-SCNC: 4.1 MMOL/L (ref 3.6–5.5)
RBC # BLD AUTO: 3.62 M/UL (ref 4.7–6.1)
RBC # BLD AUTO: 3.62 M/UL (ref 4.7–6.1)
SODIUM SERPL-SCNC: 137 MMOL/L (ref 135–145)
SODIUM SERPL-SCNC: 137 MMOL/L (ref 135–145)
WBC # BLD AUTO: 9.7 K/UL (ref 4.8–10.8)
WBC # BLD AUTO: 9.7 K/UL (ref 4.8–10.8)

## 2024-05-29 PROCEDURE — A9270 NON-COVERED ITEM OR SERVICE: HCPCS

## 2024-05-29 PROCEDURE — 80048 BASIC METABOLIC PNL TOTAL CA: CPT

## 2024-05-29 PROCEDURE — A9270 NON-COVERED ITEM OR SERVICE: HCPCS | Performed by: PHYSICIAN ASSISTANT

## 2024-05-29 PROCEDURE — 99232 SBSQ HOSP IP/OBS MODERATE 35: CPT | Performed by: NURSE PRACTITIONER

## 2024-05-29 PROCEDURE — 36415 COLL VENOUS BLD VENIPUNCTURE: CPT

## 2024-05-29 PROCEDURE — 700111 HCHG RX REV CODE 636 W/ 250 OVERRIDE (IP): Mod: JZ | Performed by: PHYSICIAN ASSISTANT

## 2024-05-29 PROCEDURE — 700102 HCHG RX REV CODE 250 W/ 637 OVERRIDE(OP)

## 2024-05-29 PROCEDURE — 85025 COMPLETE CBC W/AUTO DIFF WBC: CPT

## 2024-05-29 PROCEDURE — 770001 HCHG ROOM/CARE - MED/SURG/GYN PRIV*

## 2024-05-29 PROCEDURE — 700102 HCHG RX REV CODE 250 W/ 637 OVERRIDE(OP): Performed by: PHYSICIAN ASSISTANT

## 2024-05-29 PROCEDURE — 700101 HCHG RX REV CODE 250

## 2024-05-29 RX ADMIN — GABAPENTIN 200 MG: 100 CAPSULE ORAL at 13:08

## 2024-05-29 RX ADMIN — METAXALONE 800 MG: 800 TABLET ORAL at 13:08

## 2024-05-29 RX ADMIN — METAXALONE 800 MG: 800 TABLET ORAL at 05:28

## 2024-05-29 RX ADMIN — OXYCODONE HYDROCHLORIDE 15 MG: 15 TABLET ORAL at 21:14

## 2024-05-29 RX ADMIN — Medication 1 EACH: at 16:20

## 2024-05-29 RX ADMIN — OXYCODONE HYDROCHLORIDE 15 MG: 15 TABLET ORAL at 13:09

## 2024-05-29 RX ADMIN — DOCUSATE SODIUM 100 MG: 100 CAPSULE, LIQUID FILLED ORAL at 16:19

## 2024-05-29 RX ADMIN — Medication 1 EACH: at 05:28

## 2024-05-29 RX ADMIN — GABAPENTIN 200 MG: 100 CAPSULE ORAL at 05:28

## 2024-05-29 RX ADMIN — GABAPENTIN 200 MG: 100 CAPSULE ORAL at 21:14

## 2024-05-29 RX ADMIN — OXYCODONE HYDROCHLORIDE 15 MG: 15 TABLET ORAL at 10:10

## 2024-05-29 RX ADMIN — Medication 1 EACH: at 13:09

## 2024-05-29 RX ADMIN — SENNOSIDES AND DOCUSATE SODIUM 1 TABLET: 50; 8.6 TABLET ORAL at 21:14

## 2024-05-29 RX ADMIN — FAMOTIDINE 20 MG: 20 TABLET, FILM COATED ORAL at 05:28

## 2024-05-29 RX ADMIN — OXYCODONE HYDROCHLORIDE 15 MG: 15 TABLET ORAL at 03:26

## 2024-05-29 RX ADMIN — METAXALONE 800 MG: 800 TABLET ORAL at 16:19

## 2024-05-29 RX ADMIN — OXYCODONE HYDROCHLORIDE 15 MG: 15 TABLET ORAL at 16:19

## 2024-05-29 RX ADMIN — DOCUSATE SODIUM 100 MG: 100 CAPSULE, LIQUID FILLED ORAL at 05:28

## 2024-05-29 RX ADMIN — OXYCODONE HYDROCHLORIDE 15 MG: 15 TABLET ORAL at 06:33

## 2024-05-29 RX ADMIN — ENOXAPARIN SODIUM 40 MG: 100 INJECTION SUBCUTANEOUS at 16:20

## 2024-05-29 RX ADMIN — ENOXAPARIN SODIUM 40 MG: 100 INJECTION SUBCUTANEOUS at 05:28

## 2024-05-29 ASSESSMENT — ENCOUNTER SYMPTOMS
RESPIRATORY NEGATIVE: 1
MYALGIAS: 1
PSYCHIATRIC NEGATIVE: 1
NEUROLOGICAL NEGATIVE: 1

## 2024-05-29 ASSESSMENT — PAIN DESCRIPTION - PAIN TYPE
TYPE: ACUTE PAIN;SURGICAL PAIN

## 2024-05-29 NOTE — CARE PLAN
The patient is Stable - Low risk of patient condition declining or worsening    Shift Goals  Clinical Goals: pain control, safety, plan of care  Patient Goals: pain control, rest  Family Goals: not present    Problem: Fall Risk  Goal: Patient will remain free from falls  Outcome: Progressing     Problem: Pain - Standard  Goal: Alleviation of pain or a reduction in pain to the patient’s comfort goal  Outcome: Progressing     Progress made toward(s) clinical / shift goals:  Pt received pain meds per MAR and rested to help alleviate pain. Pt calls appropriately for assistance and fall risk sign in place to decrease risk of falls.
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
## 2024-05-29 NOTE — CARE PLAN
The patient is Watcher - Medium risk of patient condition declining or worsening    Shift Goals  Clinical Goals: pain control  Patient Goals: pain control, rest  Family Goals: not present    Progress made toward(s) clinical / shift goals:    Problem: Knowledge Deficit - Standard  Goal: Patient and family/care givers will demonstrate understanding of plan of care, disease process/condition, diagnostic tests and medications  Outcome: Progressing   POC discussed-pt verbalized understanding.  Problem: Pain - Standard  Goal: Alleviation of pain or a reduction in pain to the patient’s comfort goal  Outcome: Progressing  Oxy given PRN with +results. Educated pt on importance of pain control- pt verbalized understanding.      Patient is not progressing towards the following goals:

## 2024-05-30 ENCOUNTER — APPOINTMENT (OUTPATIENT)
Dept: RADIOLOGY | Facility: MEDICAL CENTER | Age: 34
DRG: 464 | End: 2024-05-30
Attending: ORTHOPAEDIC SURGERY
Payer: OTHER MISCELLANEOUS

## 2024-05-30 ENCOUNTER — ANESTHESIA (OUTPATIENT)
Dept: SURGERY | Facility: MEDICAL CENTER | Age: 34
End: 2024-05-30
Payer: OTHER MISCELLANEOUS

## 2024-05-30 ENCOUNTER — ANESTHESIA EVENT (OUTPATIENT)
Dept: SURGERY | Facility: MEDICAL CENTER | Age: 34
End: 2024-05-30
Payer: OTHER MISCELLANEOUS

## 2024-05-30 PROCEDURE — A9270 NON-COVERED ITEM OR SERVICE: HCPCS | Performed by: PHYSICIAN ASSISTANT

## 2024-05-30 PROCEDURE — 160002 HCHG RECOVERY MINUTES (STAT): Performed by: ORTHOPAEDIC SURGERY

## 2024-05-30 PROCEDURE — 160026 HCHG SURGERY MINUTES - 1ST 30 MINS LEVEL 1: Performed by: ORTHOPAEDIC SURGERY

## 2024-05-30 PROCEDURE — 700111 HCHG RX REV CODE 636 W/ 250 OVERRIDE (IP): Performed by: ANESTHESIOLOGY

## 2024-05-30 PROCEDURE — 700111 HCHG RX REV CODE 636 W/ 250 OVERRIDE (IP): Mod: JZ | Performed by: PHYSICIAN ASSISTANT

## 2024-05-30 PROCEDURE — 700102 HCHG RX REV CODE 250 W/ 637 OVERRIDE(OP)

## 2024-05-30 PROCEDURE — 160048 HCHG OR STATISTICAL LEVEL 1-5: Performed by: ORTHOPAEDIC SURGERY

## 2024-05-30 PROCEDURE — A9270 NON-COVERED ITEM OR SERVICE: HCPCS

## 2024-05-30 PROCEDURE — 160035 HCHG PACU - 1ST 60 MINS PHASE I: Performed by: ORTHOPAEDIC SURGERY

## 2024-05-30 PROCEDURE — 160037 HCHG SURGERY MINUTES - EA ADDL 1 MIN LEVEL 1: Performed by: ORTHOPAEDIC SURGERY

## 2024-05-30 PROCEDURE — 160036 HCHG PACU - EA ADDL 30 MINS PHASE I: Performed by: ORTHOPAEDIC SURGERY

## 2024-05-30 PROCEDURE — 700101 HCHG RX REV CODE 250

## 2024-05-30 PROCEDURE — 73120 X-RAY EXAM OF HAND: CPT | Mod: LT

## 2024-05-30 PROCEDURE — 0PSQXZZ REPOSITION LEFT METACARPAL, EXTERNAL APPROACH: ICD-10-PCS | Performed by: ORTHOPAEDIC SURGERY

## 2024-05-30 PROCEDURE — 160009 HCHG ANES TIME/MIN: Performed by: ORTHOPAEDIC SURGERY

## 2024-05-30 PROCEDURE — 770001 HCHG ROOM/CARE - MED/SURG/GYN PRIV*

## 2024-05-30 PROCEDURE — 700102 HCHG RX REV CODE 250 W/ 637 OVERRIDE(OP): Performed by: PHYSICIAN ASSISTANT

## 2024-05-30 PROCEDURE — 99232 SBSQ HOSP IP/OBS MODERATE 35: CPT | Performed by: NURSE PRACTITIONER

## 2024-05-30 RX ORDER — HALOPERIDOL 5 MG/ML
1 INJECTION INTRAMUSCULAR
Status: DISCONTINUED | OUTPATIENT
Start: 2024-05-30 | End: 2024-05-30 | Stop reason: HOSPADM

## 2024-05-30 RX ORDER — CEFAZOLIN SODIUM 1 G/3ML
INJECTION, POWDER, FOR SOLUTION INTRAMUSCULAR; INTRAVENOUS PRN
Status: DISCONTINUED | OUTPATIENT
Start: 2024-05-30 | End: 2024-05-30 | Stop reason: HOSPADM

## 2024-05-30 RX ORDER — DEXAMETHASONE SODIUM PHOSPHATE 4 MG/ML
INJECTION, SOLUTION INTRA-ARTICULAR; INTRALESIONAL; INTRAMUSCULAR; INTRAVENOUS; SOFT TISSUE PRN
Status: DISCONTINUED | OUTPATIENT
Start: 2024-05-30 | End: 2024-05-30 | Stop reason: SURG

## 2024-05-30 RX ORDER — HYDROMORPHONE HYDROCHLORIDE 1 MG/ML
0.2 INJECTION, SOLUTION INTRAMUSCULAR; INTRAVENOUS; SUBCUTANEOUS
Status: DISCONTINUED | OUTPATIENT
Start: 2024-05-30 | End: 2024-05-30 | Stop reason: HOSPADM

## 2024-05-30 RX ORDER — ONDANSETRON 2 MG/ML
INJECTION INTRAMUSCULAR; INTRAVENOUS PRN
Status: DISCONTINUED | OUTPATIENT
Start: 2024-05-30 | End: 2024-05-30 | Stop reason: SURG

## 2024-05-30 RX ORDER — DIPHENHYDRAMINE HYDROCHLORIDE 50 MG/ML
12.5 INJECTION INTRAMUSCULAR; INTRAVENOUS
Status: DISCONTINUED | OUTPATIENT
Start: 2024-05-30 | End: 2024-05-30 | Stop reason: HOSPADM

## 2024-05-30 RX ORDER — ONDANSETRON 2 MG/ML
4 INJECTION INTRAMUSCULAR; INTRAVENOUS
Status: DISCONTINUED | OUTPATIENT
Start: 2024-05-30 | End: 2024-05-30 | Stop reason: HOSPADM

## 2024-05-30 RX ORDER — HYDROMORPHONE HYDROCHLORIDE 1 MG/ML
0.1 INJECTION, SOLUTION INTRAMUSCULAR; INTRAVENOUS; SUBCUTANEOUS
Status: DISCONTINUED | OUTPATIENT
Start: 2024-05-30 | End: 2024-05-30 | Stop reason: HOSPADM

## 2024-05-30 RX ORDER — SODIUM CHLORIDE, SODIUM LACTATE, POTASSIUM CHLORIDE, CALCIUM CHLORIDE 600; 310; 30; 20 MG/100ML; MG/100ML; MG/100ML; MG/100ML
INJECTION, SOLUTION INTRAVENOUS CONTINUOUS
Status: DISCONTINUED | OUTPATIENT
Start: 2024-05-30 | End: 2024-05-30 | Stop reason: HOSPADM

## 2024-05-30 RX ORDER — HYDROMORPHONE HYDROCHLORIDE 1 MG/ML
0.4 INJECTION, SOLUTION INTRAMUSCULAR; INTRAVENOUS; SUBCUTANEOUS
Status: DISCONTINUED | OUTPATIENT
Start: 2024-05-30 | End: 2024-05-30 | Stop reason: HOSPADM

## 2024-05-30 RX ORDER — SODIUM CHLORIDE, SODIUM LACTATE, POTASSIUM CHLORIDE, CALCIUM CHLORIDE 600; 310; 30; 20 MG/100ML; MG/100ML; MG/100ML; MG/100ML
INJECTION, SOLUTION INTRAVENOUS
Status: DISCONTINUED | OUTPATIENT
Start: 2024-05-30 | End: 2024-05-30 | Stop reason: SURG

## 2024-05-30 RX ADMIN — SENNOSIDES AND DOCUSATE SODIUM 1 TABLET: 50; 8.6 TABLET ORAL at 21:20

## 2024-05-30 RX ADMIN — OXYCODONE HYDROCHLORIDE 15 MG: 15 TABLET ORAL at 12:22

## 2024-05-30 RX ADMIN — PROPOFOL 200 MG: 10 INJECTION, EMULSION INTRAVENOUS at 13:39

## 2024-05-30 RX ADMIN — METAXALONE 800 MG: 800 TABLET ORAL at 16:54

## 2024-05-30 RX ADMIN — METAXALONE 800 MG: 800 TABLET ORAL at 04:19

## 2024-05-30 RX ADMIN — ONDANSETRON 4 MG: 2 INJECTION INTRAMUSCULAR; INTRAVENOUS at 14:02

## 2024-05-30 RX ADMIN — OXYCODONE HYDROCHLORIDE 15 MG: 15 TABLET ORAL at 04:21

## 2024-05-30 RX ADMIN — ENOXAPARIN SODIUM 40 MG: 100 INJECTION SUBCUTANEOUS at 16:54

## 2024-05-30 RX ADMIN — CEFAZOLIN 2 G: 1 INJECTION, POWDER, FOR SOLUTION INTRAMUSCULAR; INTRAVENOUS at 13:32

## 2024-05-30 RX ADMIN — GABAPENTIN 200 MG: 100 CAPSULE ORAL at 04:19

## 2024-05-30 RX ADMIN — Medication 1 EACH: at 04:20

## 2024-05-30 RX ADMIN — ENOXAPARIN SODIUM 40 MG: 100 INJECTION SUBCUTANEOUS at 04:19

## 2024-05-30 RX ADMIN — DOCUSATE SODIUM 100 MG: 100 CAPSULE, LIQUID FILLED ORAL at 16:54

## 2024-05-30 RX ADMIN — SODIUM CHLORIDE, POTASSIUM CHLORIDE, SODIUM LACTATE AND CALCIUM CHLORIDE: 600; 310; 30; 20 INJECTION, SOLUTION INTRAVENOUS at 13:39

## 2024-05-30 RX ADMIN — HYDROMORPHONE HYDROCHLORIDE 0.4 MG: 1 INJECTION, SOLUTION INTRAMUSCULAR; INTRAVENOUS; SUBCUTANEOUS at 14:47

## 2024-05-30 RX ADMIN — HYDROMORPHONE HYDROCHLORIDE 0.4 MG: 1 INJECTION, SOLUTION INTRAMUSCULAR; INTRAVENOUS; SUBCUTANEOUS at 14:38

## 2024-05-30 RX ADMIN — HYDROMORPHONE HYDROCHLORIDE 0.2 MG: 1 INJECTION, SOLUTION INTRAMUSCULAR; INTRAVENOUS; SUBCUTANEOUS at 15:01

## 2024-05-30 RX ADMIN — FENTANYL CITRATE 150 MCG: 50 INJECTION, SOLUTION INTRAMUSCULAR; INTRAVENOUS at 13:53

## 2024-05-30 RX ADMIN — DOCUSATE SODIUM 100 MG: 100 CAPSULE, LIQUID FILLED ORAL at 04:19

## 2024-05-30 RX ADMIN — DEXAMETHASONE SODIUM PHOSPHATE 4 MG: 4 INJECTION INTRA-ARTICULAR; INTRALESIONAL; INTRAMUSCULAR; INTRAVENOUS; SOFT TISSUE at 14:02

## 2024-05-30 RX ADMIN — OXYCODONE HYDROCHLORIDE 15 MG: 15 TABLET ORAL at 16:54

## 2024-05-30 RX ADMIN — GABAPENTIN 200 MG: 100 CAPSULE ORAL at 21:20

## 2024-05-30 ASSESSMENT — PAIN DESCRIPTION - PAIN TYPE
TYPE: ACUTE PAIN;SURGICAL PAIN
TYPE: SURGICAL PAIN
TYPE: ACUTE PAIN;SURGICAL PAIN
TYPE: SURGICAL PAIN
TYPE: ACUTE PAIN;SURGICAL PAIN
TYPE: SURGICAL PAIN
TYPE: SURGICAL PAIN
TYPE: ACUTE PAIN;SURGICAL PAIN
TYPE: SURGICAL PAIN

## 2024-05-30 ASSESSMENT — ENCOUNTER SYMPTOMS
NEUROLOGICAL NEGATIVE: 1
ROS GI COMMENTS: BM 5/29
PSYCHIATRIC NEGATIVE: 1
RESPIRATORY NEGATIVE: 1
MYALGIAS: 1

## 2024-05-30 ASSESSMENT — PAIN SCALES - GENERAL: PAIN_LEVEL: 1

## 2024-05-30 NOTE — ANESTHESIA PROCEDURE NOTES
Airway    Date/Time: 5/30/2024 1:49 PM    Performed by: Michele Hussein M.D.  Authorized by: Michele Hussein M.D.    Location:  OR  Urgency:  Elective  Indications for Airway Management:  Anesthesia      Spontaneous Ventilation: absent    Sedation Level:  Deep  Preoxygenated: Yes    Final Airway Type:  Supraglottic airway  Final Supraglottic Airway:  Standard LMA    SGA Size:  4  Number of Attempts at Approach:  1
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
## 2024-05-30 NOTE — CARE PLAN
The patient is Watcher - Medium risk of patient condition declining or worsening    Shift Goals  Clinical Goals: surgery  Patient Goals: rest, surgery  Family Goals: not present    Progress made toward(s) clinical / shift goals:    Problem: Knowledge Deficit - Standard  Goal: Patient and family/care givers will demonstrate understanding of plan of care, disease process/condition, diagnostic tests and medications  Outcome: Progressing   POC discussed-pt verbalized understanding.  Problem: Pain - Standard  Goal: Alleviation of pain or a reduction in pain to the patient’s comfort goal  Outcome: Progressing  Oxy given PRN with +results. Educated pt on importance of pain control- pt verbalized understanding.      Patient is not progressing towards the following goals:

## 2024-05-30 NOTE — THERAPY
Physical Therapy Contact Note    Patient Name: Hayden Manzanares II  Age:  33 y.o., Sex:  male  Medical Record #: [id number]  Today's Date: 5/30/2024    Pt in OR, will re-attempt as appropriate.
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
## 2024-05-30 NOTE — ANESTHESIA PREPROCEDURE EVALUATION
Case: [id number] Date/Time: 05/30/24 1145    Procedure: ORIF, FOOT (Left)    Location: [facility] OR 09 / SURGERY [facility]    Surgeons: J Carlos Pollard M.D.            Relevant Problems   No relevant active problems       Physical Exam    Airway   Mallampati: II  TM distance: >3 FB  Neck ROM: full       Cardiovascular - normal exam  Rhythm: regular  Rate: normal  (-) murmur     Dental - normal exam           Pulmonary - normal exam  Breath sounds clear to auscultation     Abdominal    Neurological - normal exam                   Anesthesia Plan    ASA 1       Plan - general       Airway plan will be LMA          Induction: intravenous and inhalational    Postoperative Plan: Postoperative administration of opioids is intended.    Pertinent diagnostic labs and testing reviewed    Informed Consent:    Anesthetic plan and risks discussed with patient.    Use of blood products discussed with: patient whom consented to blood products.

## 2024-05-30 NOTE — ANESTHESIA TIME REPORT
Anesthesia Start and Stop Event Times       Date Time Event    5/30/2024 1122 Ready for Procedure     1332 Anesthesia Start     1421 Anesthesia Stop          Responsible Staff  05/30/24      Name Role Begin End    Michele Hussein M.D. Anesth 1332 1421          Overtime Reason:  no overtime (within assigned shift)    Comments:

## 2024-05-30 NOTE — OR SURGEON
Immediate Post OP Note    PreOp Diagnosis: Right 1st-4th metatarsal fractures, Left 4th/5th metacarpal neck fractures      PostOp Diagnosis: same      Procedure(s):  Closed Reduction left 4th and 5th metacarpal fractures - Wound Class: Clean    Surgeon(s):  J Carlos Pollard M.D.    Anesthesiologist/Type of Anesthesia:  Anesthesiologist: Michele Hussein M.D./General    Surgical Staff:  Circulator: Tanisha Davila R.N.  Scrub Person: Andre Mathews  Radiology Technologist: Liyah Thomson    Specimens removed if any:  * No specimens in log *    Estimated Blood Loss: n/a    Findings: extensive hemorrhagic fractures blisters right foot    Complications: none known    PLAN:  --readmit postop  --NWB BLE  --NWB L hand, okay to WB through left forearm with platform walker  --will await resolution of right foot blisters prior to definitive surgical fixation          5/30/2024 2:14 PM J Carlos Pollard M.D.

## 2024-05-31 ENCOUNTER — APPOINTMENT (OUTPATIENT)
Dept: RADIOLOGY | Facility: MEDICAL CENTER | Age: 34
DRG: 464 | End: 2024-05-31
Attending: SURGERY
Payer: OTHER MISCELLANEOUS

## 2024-05-31 VITALS
OXYGEN SATURATION: 94 % | HEART RATE: 85 BPM | TEMPERATURE: 98.6 F | SYSTOLIC BLOOD PRESSURE: 114 MMHG | RESPIRATION RATE: 16 BRPM | DIASTOLIC BLOOD PRESSURE: 75 MMHG | BODY MASS INDEX: 25.18 KG/M2 | HEIGHT: 69 IN | WEIGHT: 170 LBS

## 2024-05-31 LAB
ANION GAP SERPL CALC-SCNC: 17 MMOL/L (ref 7–16)
ANION GAP SERPL CALC-SCNC: 17 MMOL/L (ref 7–16)
BASOPHILS # BLD AUTO: 0.4 % (ref 0–1.8)
BASOPHILS # BLD AUTO: 0.4 % (ref 0–1.8)
BASOPHILS # BLD: 0.04 K/UL (ref 0–0.12)
BASOPHILS # BLD: 0.04 K/UL (ref 0–0.12)
BUN SERPL-MCNC: 18 MG/DL (ref 8–22)
BUN SERPL-MCNC: 18 MG/DL (ref 8–22)
CALCIUM SERPL-MCNC: 8.9 MG/DL (ref 8.5–10.5)
CALCIUM SERPL-MCNC: 8.9 MG/DL (ref 8.5–10.5)
CHLORIDE SERPL-SCNC: 102 MMOL/L (ref 96–112)
CHLORIDE SERPL-SCNC: 102 MMOL/L (ref 96–112)
CO2 SERPL-SCNC: 20 MMOL/L (ref 20–33)
CO2 SERPL-SCNC: 20 MMOL/L (ref 20–33)
CREAT SERPL-MCNC: 0.82 MG/DL (ref 0.5–1.4)
CREAT SERPL-MCNC: 0.82 MG/DL (ref 0.5–1.4)
EOSINOPHIL # BLD AUTO: 0.11 K/UL (ref 0–0.51)
EOSINOPHIL # BLD AUTO: 0.11 K/UL (ref 0–0.51)
EOSINOPHIL NFR BLD: 1.1 % (ref 0–6.9)
EOSINOPHIL NFR BLD: 1.1 % (ref 0–6.9)
ERYTHROCYTE [DISTWIDTH] IN BLOOD BY AUTOMATED COUNT: 39.8 FL (ref 35.9–50)
ERYTHROCYTE [DISTWIDTH] IN BLOOD BY AUTOMATED COUNT: 39.8 FL (ref 35.9–50)
GFR SERPLBLD CREATININE-BSD FMLA CKD-EPI: 119 ML/MIN/1.73 M 2
GFR SERPLBLD CREATININE-BSD FMLA CKD-EPI: 119 ML/MIN/1.73 M 2
GLUCOSE SERPL-MCNC: 149 MG/DL (ref 65–99)
GLUCOSE SERPL-MCNC: 149 MG/DL (ref 65–99)
HCT VFR BLD AUTO: 29.1 % (ref 42–52)
HCT VFR BLD AUTO: 29.1 % (ref 42–52)
HGB BLD-MCNC: 10.3 G/DL (ref 14–18)
HGB BLD-MCNC: 10.3 G/DL (ref 14–18)
IMM GRANULOCYTES # BLD AUTO: 0.28 K/UL (ref 0–0.11)
IMM GRANULOCYTES # BLD AUTO: 0.28 K/UL (ref 0–0.11)
IMM GRANULOCYTES NFR BLD AUTO: 2.7 % (ref 0–0.9)
IMM GRANULOCYTES NFR BLD AUTO: 2.7 % (ref 0–0.9)
LYMPHOCYTES # BLD AUTO: 1.64 K/UL (ref 1–4.8)
LYMPHOCYTES # BLD AUTO: 1.64 K/UL (ref 1–4.8)
LYMPHOCYTES NFR BLD: 16 % (ref 22–41)
LYMPHOCYTES NFR BLD: 16 % (ref 22–41)
MCH RBC QN AUTO: 29.3 PG (ref 27–33)
MCH RBC QN AUTO: 29.3 PG (ref 27–33)
MCHC RBC AUTO-ENTMCNC: 35.4 G/DL (ref 32.3–36.5)
MCHC RBC AUTO-ENTMCNC: 35.4 G/DL (ref 32.3–36.5)
MCV RBC AUTO: 82.9 FL (ref 81.4–97.8)
MCV RBC AUTO: 82.9 FL (ref 81.4–97.8)
MONOCYTES # BLD AUTO: 0.84 K/UL (ref 0–0.85)
MONOCYTES # BLD AUTO: 0.84 K/UL (ref 0–0.85)
MONOCYTES NFR BLD AUTO: 8.2 % (ref 0–13.4)
MONOCYTES NFR BLD AUTO: 8.2 % (ref 0–13.4)
NEUTROPHILS # BLD AUTO: 7.31 K/UL (ref 1.82–7.42)
NEUTROPHILS # BLD AUTO: 7.31 K/UL (ref 1.82–7.42)
NEUTROPHILS NFR BLD: 71.6 % (ref 44–72)
NEUTROPHILS NFR BLD: 71.6 % (ref 44–72)
NRBC # BLD AUTO: 0 K/UL
NRBC # BLD AUTO: 0 K/UL
NRBC BLD-RTO: 0 /100 WBC (ref 0–0.2)
NRBC BLD-RTO: 0 /100 WBC (ref 0–0.2)
PLATELET # BLD AUTO: 297 K/UL (ref 164–446)
PLATELET # BLD AUTO: 297 K/UL (ref 164–446)
PMV BLD AUTO: 10.1 FL (ref 9–12.9)
PMV BLD AUTO: 10.1 FL (ref 9–12.9)
POTASSIUM SERPL-SCNC: 3.6 MMOL/L (ref 3.6–5.5)
POTASSIUM SERPL-SCNC: 3.6 MMOL/L (ref 3.6–5.5)
RBC # BLD AUTO: 3.51 M/UL (ref 4.7–6.1)
RBC # BLD AUTO: 3.51 M/UL (ref 4.7–6.1)
SODIUM SERPL-SCNC: 139 MMOL/L (ref 135–145)
SODIUM SERPL-SCNC: 139 MMOL/L (ref 135–145)
WBC # BLD AUTO: 10.2 K/UL (ref 4.8–10.8)
WBC # BLD AUTO: 10.2 K/UL (ref 4.8–10.8)

## 2024-05-31 PROCEDURE — 36415 COLL VENOUS BLD VENIPUNCTURE: CPT

## 2024-05-31 PROCEDURE — 80048 BASIC METABOLIC PNL TOTAL CA: CPT

## 2024-05-31 PROCEDURE — A9270 NON-COVERED ITEM OR SERVICE: HCPCS

## 2024-05-31 PROCEDURE — 700102 HCHG RX REV CODE 250 W/ 637 OVERRIDE(OP)

## 2024-05-31 PROCEDURE — 85025 COMPLETE CBC W/AUTO DIFF WBC: CPT

## 2024-05-31 PROCEDURE — 700111 HCHG RX REV CODE 636 W/ 250 OVERRIDE (IP): Mod: JZ | Performed by: PHYSICIAN ASSISTANT

## 2024-05-31 PROCEDURE — 770001 HCHG ROOM/CARE - MED/SURG/GYN PRIV*

## 2024-05-31 PROCEDURE — 700102 HCHG RX REV CODE 250 W/ 637 OVERRIDE(OP): Performed by: NURSE PRACTITIONER

## 2024-05-31 PROCEDURE — 700111 HCHG RX REV CODE 636 W/ 250 OVERRIDE (IP)

## 2024-05-31 PROCEDURE — 99255 IP/OBS CONSLTJ NEW/EST HI 80: CPT | Performed by: PHYSICAL MEDICINE & REHABILITATION

## 2024-05-31 PROCEDURE — A9270 NON-COVERED ITEM OR SERVICE: HCPCS | Performed by: NURSE PRACTITIONER

## 2024-05-31 PROCEDURE — 700102 HCHG RX REV CODE 250 W/ 637 OVERRIDE(OP): Performed by: PHYSICIAN ASSISTANT

## 2024-05-31 PROCEDURE — A9270 NON-COVERED ITEM OR SERVICE: HCPCS | Performed by: PHYSICIAN ASSISTANT

## 2024-05-31 PROCEDURE — 99232 SBSQ HOSP IP/OBS MODERATE 35: CPT | Performed by: NURSE PRACTITIONER

## 2024-05-31 RX ORDER — CELECOXIB 100 MG/1
100 CAPSULE ORAL 2 TIMES DAILY
Status: DISCONTINUED | OUTPATIENT
Start: 2024-05-31 | End: 2024-06-07

## 2024-05-31 RX ORDER — GABAPENTIN 300 MG/1
300 CAPSULE ORAL EVERY 8 HOURS
Status: DISCONTINUED | OUTPATIENT
Start: 2024-05-31 | End: 2024-06-17

## 2024-05-31 RX ADMIN — DOCUSATE SODIUM 100 MG: 100 CAPSULE, LIQUID FILLED ORAL at 17:20

## 2024-05-31 RX ADMIN — CELECOXIB 100 MG: 100 CAPSULE ORAL at 11:07

## 2024-05-31 RX ADMIN — OXYCODONE HYDROCHLORIDE 15 MG: 15 TABLET ORAL at 12:17

## 2024-05-31 RX ADMIN — OXYCODONE HYDROCHLORIDE 15 MG: 15 TABLET ORAL at 17:20

## 2024-05-31 RX ADMIN — DOCUSATE SODIUM 100 MG: 100 CAPSULE, LIQUID FILLED ORAL at 04:50

## 2024-05-31 RX ADMIN — ENOXAPARIN SODIUM 40 MG: 100 INJECTION SUBCUTANEOUS at 17:20

## 2024-05-31 RX ADMIN — METAXALONE 800 MG: 800 TABLET ORAL at 21:23

## 2024-05-31 RX ADMIN — METAXALONE 800 MG: 800 TABLET ORAL at 04:50

## 2024-05-31 RX ADMIN — ENOXAPARIN SODIUM 40 MG: 100 INJECTION SUBCUTANEOUS at 04:50

## 2024-05-31 RX ADMIN — GABAPENTIN 300 MG: 300 CAPSULE ORAL at 21:24

## 2024-05-31 RX ADMIN — GABAPENTIN 200 MG: 100 CAPSULE ORAL at 04:50

## 2024-05-31 RX ADMIN — CELECOXIB 100 MG: 100 CAPSULE ORAL at 17:20

## 2024-05-31 RX ADMIN — OXYCODONE HYDROCHLORIDE 15 MG: 15 TABLET ORAL at 07:41

## 2024-05-31 ASSESSMENT — COGNITIVE AND FUNCTIONAL STATUS - GENERAL
STANDING UP FROM CHAIR USING ARMS: TOTAL
EATING MEALS: TOTAL
CLIMB 3 TO 5 STEPS WITH RAILING: TOTAL
SUGGESTED CMS G CODE MODIFIER DAILY ACTIVITY: CM
TOILETING: TOTAL
STANDING UP FROM CHAIR USING ARMS: TOTAL
DRESSING REGULAR LOWER BODY CLOTHING: TOTAL
MOVING FROM LYING ON BACK TO SITTING ON SIDE OF FLAT BED: TOTAL
MOVING TO AND FROM BED TO CHAIR: TOTAL
SUGGESTED CMS G CODE MODIFIER MOBILITY: CN
MOVING FROM LYING ON BACK TO SITTING ON SIDE OF FLAT BED: A LITTLE
MOBILITY SCORE: 6
DAILY ACTIVITIY SCORE: 7
MOVING TO AND FROM BED TO CHAIR: A LITTLE
CLIMB 3 TO 5 STEPS WITH RAILING: TOTAL
WALKING IN HOSPITAL ROOM: TOTAL
TURNING FROM BACK TO SIDE WHILE IN FLAT BAD: A LITTLE
PERSONAL GROOMING: A LOT
TURNING FROM BACK TO SIDE WHILE IN FLAT BAD: TOTAL
HELP NEEDED FOR BATHING: TOTAL
MOBILITY SCORE: 12
DRESSING REGULAR UPPER BODY CLOTHING: TOTAL
SUGGESTED CMS G CODE MODIFIER MOBILITY: CL
WALKING IN HOSPITAL ROOM: TOTAL

## 2024-05-31 ASSESSMENT — PAIN DESCRIPTION - PAIN TYPE
TYPE: SURGICAL PAIN
TYPE: ACUTE PAIN
TYPE: SURGICAL PAIN
TYPE: ACUTE PAIN

## 2024-05-31 ASSESSMENT — ENCOUNTER SYMPTOMS
MYALGIAS: 1
RESPIRATORY NEGATIVE: 1
ROS GI COMMENTS: BM 5/29
PSYCHIATRIC NEGATIVE: 1
NEUROLOGICAL NEGATIVE: 1
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
## 2024-05-31 NOTE — DISCHARGE PLANNING
[facility] Transitional Care Coordination    Referral from: [facility]    Insurance Provider on Facesheet: [employer]  Potential Rehab Diagnosis:     Chart review indicates patient may have on going medical management and may have therapy needs to possibly meet inpatient rehab facility criteria with the goal of returning to community.    D/C support: Parents in [place]     Physiatry consultation forwarded per protocol.     Physiatry to consult, pending post-op therapy evals as appropriate. TCC will follow.     Thank you for the referral.

## 2024-05-31 NOTE — DISCHARGE PLANNING
Case Management Discharge Planning    Admission Date: 5/23/2024  GMLOS: 5.6  ALOS: 8    6-Clicks ADL Score: 7  6-Clicks Mobility Score: 6  PT and/or OT Eval ordered: Yes  Post-acute Referrals Ordered: Yes  Post-acute Choice Obtained: Yes  Has referral(s) been sent to post-acute provider:  Yes    Anticipated Discharge Dispo: Discharge Disposition: D/T to SNF with Medicare cert in anticipation of skilled care (03)    DME Needed: No    Action(s) Taken: LMSW met with pt at bedside to complete assessment and discuss post-acute placement. Pt stated that he is agreeable to DC to a post-acute placement in [place]. Pt made choice for 1) [facility]. Choice form was faxed to [facility].    UPDATE 1030  LMSW received notice from PA that they ordered a Physiatry consult. TCC is following at this time.    Escalations Completed: None    Medically Clear: No    Next Steps: LMSW to follow for any additional CM needs.     Barriers to Discharge: Medical clearance    Is the patient up for discharge tomorrow: No    Care Transition Team Assessment  LMSW met with pt at bedside to complete assessment. Pt A&Ox4 and able to verify the information on the face sheet. Pt lives with his sister and grandpa in a single-story house at [address] [place] [address] that has two steps to enter. Prior to this hospitalization, pt reports being independent at home with ADLs and IADLs. Pt denies any DME at baseline. Pt reports his sister and parents as good support for him. Pt states that his parents live next door to him in [place]. Pt is unemployed and does not have a source of income at this time. Pt denies any substance use or mental health concerns. Pt denies having an advance directive and refused AD packet at this time.  Information Source  Orientation Level: Oriented X4  Information Given By: Patient  Informant's Name: Hayden  Who is responsible for making decisions for patient? : Patient    Readmission Evaluation  Is this a readmission?:  No    Elopement Risk  Legal Hold: No  Ambulatory or Self Mobile in Wheelchair: No-Not an Elopement Risk  Disoriented: No  Psychiatric Symptoms: None  History of Wandering: No  Elopement this Admit: No  Vocalizing Wanting to Leave: No  Displays Behaviors, Body Language Wanting to Leave: No-Not at Risk for Elopement  Elopement Risk: Not at Risk for Elopement    Interdisciplinary Discharge Planning  Lives with - Patient's Self Care Capacity: Significant Other (pt states he will probably go to [place] with parents after discharge)  Patient or legal guardian wants to designate a caregiver: No  Support Systems: Family Member(s), Spouse / Significant Other  Housing / Facility: Other (Comments), Mobile Home (car/RV)  Prior Services: Home-Independent    Discharge Preparedness  What is your plan after discharge?: Skilled nursing facility  What are your discharge supports?: Parent, Sibling, Grandparent  Prior Functional Level: Ambulatory, Drives Self, Independent with Activities of Daily Living, Independent with Medication Management  Difficulity with ADLs: None  Difficulity with IADLs: None    Functional Assesment  Prior Functional Level: Ambulatory, Drives Self, Independent with Activities of Daily Living, Independent with Medication Management    Finances  Financial Barriers to Discharge: No  Prescription Coverage: Yes    Vision / Hearing Impairment  Vision Impairment : Yes  Right Eye Vision: Wears Glasses  Left Eye Vision: Wears Glasses  Hearing Impairment : No    Advance Directive  Advance Directive?: None  Advance Directive offered?: AD Booklet refused    Domestic Abuse  Have you ever been the victim of abuse or violence?: No  Possible Abuse/Neglect Reported to:: Not Applicable    Psychological Assessment  History of Substance Abuse: None  History of Psychiatric Problems: No  Non-compliant with Treatment: No  Newly Diagnosed Illness: No    Discharge Risks or Barriers  Discharge risks or barriers?: No PCP  Patient risk  factors: No PCP    Anticipated Discharge Information  Discharge Disposition: D/T to SNF with Medicare cert in anticipation of skilled care (03)

## 2024-05-31 NOTE — CARE PLAN
Problem: Knowledge Deficit - Standard  Goal: Patient and family/care givers will demonstrate understanding of plan of care, disease process/condition, diagnostic tests and medications  Description: Target End Date:  1-3 days or as soon as patient condition allows    Document in Patient Education    1.  Patient and family/caregiver oriented to unit, equipment, visitation policy and means for communicating concern  2.  Complete/review Learning Assessment  3.  Assess knowledge level of disease process/condition, treatment plan, diagnostic tests and medications  4.  Explain disease process/condition, treatment plan, diagnostic tests and medications  Outcome: Met     Problem: Skin Integrity  Goal: Skin integrity is maintained or improved  Description: Target End Date:  Prior to discharge or change in level of care    Document interventions on Skin Risk/Bienvenido flowsheet groups and corresponding LDA    1.  Assess and monitor skin integrity, appearance and/or temperature  2.  Assess risk factors for impaired skin integrity and/or pressures ulcers  3.  Implement precautions to protect skin integrity in collaboration with interdisciplinary team  4.  Implement pressure ulcer prevention protocol if at risk for skin breakdown  5.  Confirm wound care consult if at risk for skin breakdown  6.  Ensure patient use of pressure relieving devices  (Low air loss bed, waffle overlay, heel protectors, ROHO cushion, etc)  Outcome: Progressing     Problem: Fall Risk  Goal: Patient will remain free from falls  Description: Target End Date:  Prior to discharge or change in level of care    Document interventions on the Yuli Felix Fall Risk Assessment    1.  Assess for fall risk factors  2.  Implement fall precautions  Outcome: Met     Problem: Pain - Standard  Goal: Alleviation of pain or a reduction in pain to the patient’s comfort goal  Description: Target End Date:  Prior to discharge or change in level of care    Document on Vitals  flowsheet    1.  Document pain using the appropriate pain scale per order or unit policy  2.  Educate and implement non-pharmacologic comfort measures (i.e. relaxation, distraction, massage, cold/heat therapy, etc.)  3.  Pain management medications as ordered  4.  Reassess pain after pain med administration per policy  5.  If opiods administered assess patient's response to pain medication is appropriate per POSS sedation scale  6.  Follow pain management plan developed in collaboration with patient and interdisciplinary team (including palliative care or pain specialists if applicable)  Outcome: Met   The patient is Stable - Low risk of patient condition declining or worsening    Shift Goals  Clinical Goals: Safe mobility, pain control, stable vital signs  Patient Goals: Rest, pain control, comfort  Family Goals: N/A    Progress made toward(s) clinical / shift goals:      Pt. Vital signs WDL.   Pt. Has no new skin integrity issue/ impairment.   Pt. Verbalized understanding on the care provided.   Pt. Didn't complain of pain under RN care.   Pt. Didn't fall under RN care. Fall precautions in place.

## 2024-05-31 NOTE — PROGRESS NOTES
"Pt. Was throwing things  away, shouting and coursing. As per patient \" he gets tired  being in bed, uncomfortable in bed.\" Pt. Even coursed \" fuck you.\"     RN informed Charge RN. Pt. Was informed that security will be called once he do it again.   "
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
## 2024-05-31 NOTE — PROGRESS NOTES
"      Orthopaedic Progress Note    Interval changes:  Patient doing well post op  LLE dressings are CDI  Pending resolution of soft tissue issues before final fixation of his left foot    ROS - Patient denies any new issues.  Pain well controlled.    /76   Pulse 73   Temp 37.3 °C (99.1 °F) (Temporal)   Resp 16   Ht 1.753 m (5' 9.02\")   Wt 77.1 kg (170 lb)   SpO2 90%     Patient seen and examined  No acute distress  Breathing non labored  RRR  LLE in splint, RLE in cam boot.  BLE DNVI, moves all toes, cap refill <2 sec.     Recent Labs     05/29/24  0623 05/31/24  1159   WBC 9.7 10.2   RBC 3.62* 3.51*   HEMOGLOBIN 10.5* 10.3*   HEMATOCRIT 31.7* 29.1*   MCV 87.6 82.9   MCH 29.0 29.3   MCHC 33.1 35.4   RDW 41.7 39.8   PLATELETCT 188 297   MPV 10.6 10.1       Active Hospital Problems    Diagnosis     Discharge planning issues [Z75.8]      Priority: High    Closed fracture of right tibial plateau [S82.141A]      Priority: High    Fracture of rib of left side [S22.32XA]      Priority: Medium    Multiple fractures of left foot, closed, initial encounter [S92.902A]      Priority: Medium    Closed left ankle fracture [S82.892A]      Priority: Medium    Sacral fracture, closed (HCC) [S32.10XA]      Priority: Medium    Multiple lacerations [T07.XXXA]      Priority: Medium    Multiple closed fractures of metacarpal bones [S62.309A]      Priority: Medium    Closed fracture of right scapula [S42.101A]      Priority: Medium    Closed fracture of right ankle [S82.891A]      Priority: Medium    Evaluation by psychiatric service required [Z00.8]      Priority: Low    Trauma [T14.90XA]      Priority: Low    No contraindication to deep vein thrombosis (DVT) prophylaxis [Z78.9]      Priority: Low    Compression fracture of T8 vertebra (HCC) [S22.060A]      Priority: Low       Assessment/Plan:  Patient doing well post op  LLE dressings are CDI  Pending resolution of soft tissue issues before final fixation of his left " foot  POD#1 S/P Closed treatment with manipulation, left fourth and fifth metacarpal fractures and application of short arm splint.  POD#7 S/P:  1.  Open treatment and internal fixation of left bimalleolar ankle fracture.  2.  Open treatment and internal fixation of left distal tibiofibular joint disruption.  3.  Excisional debridement of left leg laceration measuring 5x1.5 cm including skin and subcutaneous tissue.  4.  Layered repair of intermediate complexity laceration measuring 5 cm, left leg.   5. Closed treatment without manipulation of right talar dome fracture  6. Closed treatment without manipulation of right calcaneus fracture  7. Closed treatment without manipulation of right tibia plateau/eminence fracture  8. Closed treatment without manipulation of right lateral malleolus fracture  9. Closed treatment without manipulation of left sacrum and anterior pelvis fractures  Wt bearing status - NWB BLE  Wound care/Drains - Dressings to be changed every other day by nursing. Or PRN for saturation starting POD#2  Future Procedures - pending resolution of soft tissue swelling and blisters   Lovenox: Start 5/23, Duration-until ambulatory > 150'  Sutures/Staples out- 14-21 days post operatively. Removal will completed by ortho mid levels only.  PT/OT-initiated  Antibiotics: Perioperative completed  DVT Prophylaxis- TEDS/SCDs/Foot pumps  Acmpos-not needed per ortho  Case Coordination for Discharge Planning - Disposition per therapy recs.

## 2024-05-31 NOTE — THERAPY
Physical Therapy Contact Note    Patient Name: Hayden Manzanares II  Age:  33 y.o., Sex:  male  Medical Record #: [id number]  Today's Date: 5/31/2024    Pt attempted for follow up PT session. Pt declined therapy at this time. Will re-attempt as able.

## 2024-05-31 NOTE — CARE PLAN
The patient is Stable - Low risk of patient condition declining or worsening    Shift Goals  Clinical Goals: safety, PT, pain management  Patient Goals: PT, pain management, sleep  Family Goals: no family present    Progress made toward(s) clinical / shift goals:  patient received oxycodone for pain management, Resting comfortably in bed. Skin assessed. No breakdown noted.       Problem: Skin Integrity  Goal: Skin integrity is maintained or improved  Description: Target End Date:  Prior to discharge or change in level of care    Document interventions on Skin Risk/[facility]ienvenido flowsheet groups and corresponding LDA    1.  Assess and monitor skin integrity, appearance and/or temperature  2.  Assess risk factors for impaired skin integrity and/or pressures ulcers  3.  Implement precautions to protect skin integrity in collaboration with interdisciplinary team  4.  Implement pressure ulcer prevention protocol if at risk for skin breakdown  5.  Confirm wound care consult if at risk for skin breakdown  6.  Ensure patient use of pressure relieving devices  (Low air loss bed, waffle overlay, heel protectors, ROHO cushion, etc)  Outcome: Progressing     Problem: Pain - Post Surgery  Goal: Alleviation or reduction of pain post surgery  Description: Target End Date:  Target End Date:  1 - 3 days post op    1.  Pain assessed q2 hours for 24 hours, then q4 hours during use of PCA  2.  Transition to oral medications as appropriate  3.  Epidural assessment if applicable  4.  Ice to surgical site per order  Outcome: Progressing

## 2024-05-31 NOTE — PROGRESS NOTES
4 Eyes Skin Assessment Completed by JUAN F Daily and JUAN F Chao.    Head WDL  Ears WDL  Nose WDL  Mouth WDL  Neck Redness and Scab on left shoulder  Breast/Chest WDL  Shoulder Blades WDL  Spine WDL  (R) Arm/Elbow/Hand Abrasion and Scab  (L) Arm/Elbow/Hand Surgical site  Abdomen WDL  Groin Redness and Blanching  Scrotum/Coccyx/Buttocks Redness, Blanching, and Scab  (R) Leg Redness, Blanching, Scar, Scab, and Swelling  (L) Leg  DIP, LANA further.  Scattered abrasions and scab   (R) Heel/Foot/Toe LANA, hinged brace in placed  (L) Heel/Foot/Toe Incision ( surgical site on Ankle. DIP LANA  further.         Devices In Places Pulse Ox, SCD's, Leg Immobilizer, and Ortho Boot/Shoe      Interventions In Place TAP System, Pillows, Low Air Loss Mattress, Barrier Cream, and Heels Loaded W/Pillows    Possible Skin Injury Yes    Pictures Uploaded Into Epic N/A  Wound Consult Placed Yes  RN Wound Prevention Protocol Ordered Yes

## 2024-05-31 NOTE — PROGRESS NOTES
Patient lost his IV this afternoon. Multiple attempts made to place an IV with and without US by US certified RNs. Unsuccessful. Notified Valentina FERRER.

## 2024-06-01 PROCEDURE — 700111 HCHG RX REV CODE 636 W/ 250 OVERRIDE (IP): Mod: JZ | Performed by: PHYSICIAN ASSISTANT

## 2024-06-01 PROCEDURE — 700111 HCHG RX REV CODE 636 W/ 250 OVERRIDE (IP)

## 2024-06-01 PROCEDURE — A9270 NON-COVERED ITEM OR SERVICE: HCPCS

## 2024-06-01 PROCEDURE — 770001 HCHG ROOM/CARE - MED/SURG/GYN PRIV*

## 2024-06-01 PROCEDURE — 700102 HCHG RX REV CODE 250 W/ 637 OVERRIDE(OP)

## 2024-06-01 PROCEDURE — A9270 NON-COVERED ITEM OR SERVICE: HCPCS | Performed by: NURSE PRACTITIONER

## 2024-06-01 PROCEDURE — 700102 HCHG RX REV CODE 250 W/ 637 OVERRIDE(OP): Performed by: NURSE PRACTITIONER

## 2024-06-01 PROCEDURE — 99231 SBSQ HOSP IP/OBS SF/LOW 25: CPT | Performed by: NURSE PRACTITIONER

## 2024-06-01 RX ORDER — DULOXETIN HYDROCHLORIDE 30 MG/1
30 CAPSULE, DELAYED RELEASE ORAL DAILY
Status: DISCONTINUED | OUTPATIENT
Start: 2024-06-01 | End: 2024-06-26 | Stop reason: HOSPADM

## 2024-06-01 RX ADMIN — ENOXAPARIN SODIUM 40 MG: 100 INJECTION SUBCUTANEOUS at 16:48

## 2024-06-01 RX ADMIN — CELECOXIB 100 MG: 100 CAPSULE ORAL at 16:48

## 2024-06-01 RX ADMIN — OXYCODONE HYDROCHLORIDE 15 MG: 15 TABLET ORAL at 16:48

## 2024-06-01 RX ADMIN — HYDROMORPHONE HYDROCHLORIDE 0.25 MG: 1 INJECTION, SOLUTION INTRAMUSCULAR; INTRAVENOUS; SUBCUTANEOUS at 08:27

## 2024-06-01 RX ADMIN — GABAPENTIN 300 MG: 300 CAPSULE ORAL at 12:22

## 2024-06-01 RX ADMIN — DOCUSATE SODIUM 100 MG: 100 CAPSULE, LIQUID FILLED ORAL at 16:48

## 2024-06-01 RX ADMIN — HYDROMORPHONE HYDROCHLORIDE 0.25 MG: 1 INJECTION, SOLUTION INTRAMUSCULAR; INTRAVENOUS; SUBCUTANEOUS at 14:30

## 2024-06-01 RX ADMIN — OXYCODONE HYDROCHLORIDE 15 MG: 15 TABLET ORAL at 12:22

## 2024-06-01 RX ADMIN — ENOXAPARIN SODIUM 40 MG: 100 INJECTION SUBCUTANEOUS at 04:25

## 2024-06-01 RX ADMIN — OXYCODONE HYDROCHLORIDE 15 MG: 15 TABLET ORAL at 21:06

## 2024-06-01 RX ADMIN — GABAPENTIN 300 MG: 300 CAPSULE ORAL at 04:25

## 2024-06-01 RX ADMIN — METAXALONE 800 MG: 800 TABLET ORAL at 04:25

## 2024-06-01 RX ADMIN — METAXALONE 800 MG: 800 TABLET ORAL at 12:22

## 2024-06-01 RX ADMIN — DULOXETINE HYDROCHLORIDE 30 MG: 30 CAPSULE, DELAYED RELEASE ORAL at 14:31

## 2024-06-01 RX ADMIN — METAXALONE 800 MG: 800 TABLET ORAL at 16:47

## 2024-06-01 RX ADMIN — CELECOXIB 100 MG: 100 CAPSULE ORAL at 04:25

## 2024-06-01 RX ADMIN — OXYCODONE HYDROCHLORIDE 15 MG: 15 TABLET ORAL at 05:59

## 2024-06-01 RX ADMIN — OXYCODONE HYDROCHLORIDE 15 MG: 15 TABLET ORAL at 09:05

## 2024-06-01 RX ADMIN — GABAPENTIN 300 MG: 300 CAPSULE ORAL at 21:06

## 2024-06-01 ASSESSMENT — ENCOUNTER SYMPTOMS
PSYCHIATRIC NEGATIVE: 1
NEUROLOGICAL NEGATIVE: 1
ROS GI COMMENTS: BM 5/31
MYALGIAS: 1
RESPIRATORY NEGATIVE: 1

## 2024-06-01 ASSESSMENT — PAIN DESCRIPTION - PAIN TYPE
TYPE: SURGICAL PAIN

## 2024-06-01 NOTE — PROGRESS NOTES
"      Orthopaedic Progress Note    Interval changes:  Patient doing well    LLE dressings are CDI  Left foot surgery scheduled for thurs 6/6 pending soft tissue issue resolution    ROS - Patient denies any new issues.  Pain well controlled.    /76   Pulse 83   Temp 37.1 °C (98.8 °F) (Temporal)   Resp 16   Ht 1.753 m (5' 9.02\")   Wt 77.1 kg (170 lb)   SpO2 96%     Patient seen and examined  No acute distress  Breathing non labored  RRR  LLE in splint, RLE in cam boot.  BLE DNVI, moves all toes, cap refill <2 sec.     Recent Labs     05/31/24  1159   WBC 10.2   RBC 3.51*   HEMOGLOBIN 10.3*   HEMATOCRIT 29.1*   MCV 82.9   MCH 29.3   MCHC 35.4   RDW 39.8   PLATELETCT 297   MPV 10.1       Active Hospital Problems    Diagnosis     Discharge planning issues [Z75.8]      Priority: High    Closed fracture of right tibial plateau [S82.141A]      Priority: High    Fracture of rib of left side [S22.32XA]      Priority: Medium    Multiple fractures of left foot, closed, initial encounter [S92.902A]      Priority: Medium    Closed left ankle fracture [S82.892A]      Priority: Medium    Sacral fracture, closed (HCC) [S32.10XA]      Priority: Medium    Multiple lacerations [T07.XXXA]      Priority: Medium    Multiple closed fractures of metacarpal bones [S62.309A]      Priority: Medium    Closed fracture of right scapula [S42.101A]      Priority: Medium    Closed fracture of right ankle [S82.891A]      Priority: Medium    Evaluation by psychiatric service required [Z00.8]      Priority: Low    Trauma [T14.90XA]      Priority: Low    No contraindication to deep vein thrombosis (DVT) prophylaxis [Z78.9]      Priority: Low    Compression fracture of T8 vertebra (HCC) [S22.060A]      Priority: Low       Assessment/Plan:  Patient doing well    LLE dressings are CDI  Left foot surgery scheduled for thurs 6/6 pending soft tissue issue resolution  POD#2 S/P Closed treatment with manipulation, left fourth and fifth metacarpal " fractures and application of short arm splint.  POD#8 S/P:  1.  Open treatment and internal fixation of left bimalleolar ankle fracture.  2.  Open treatment and internal fixation of left distal tibiofibular joint disruption.  3.  Excisional debridement of left leg laceration measuring 5x1.5 cm including skin and subcutaneous tissue.  4.  Layered repair of intermediate complexity laceration measuring 5 cm, left leg.   5. Closed treatment without manipulation of right talar dome fracture  6. Closed treatment without manipulation of right calcaneus fracture  7. Closed treatment without manipulation of right tibia plateau/eminence fracture  8. Closed treatment without manipulation of right lateral malleolus fracture  9. Closed treatment without manipulation of left sacrum and anterior pelvis fractures  Wt bearing status - NWB BLE  Wound care/Drains - Dressings to be changed every other day by nursing. Or PRN for saturation starting POD#2  Future Procedures - pending resolution of soft tissue swelling and blisters   Lovenox: Start 5/23, Duration-until ambulatory > 150'  Sutures/Staples out- 14-21 days post operatively. Removal will completed by ortho mid levels only.  PT/OT-initiated  Antibiotics: Perioperative completed  DVT Prophylaxis- TEDS/SCDs/Foot pumps  Campos-not needed per ortho  Case Coordination for Discharge Planning - Disposition per therapy recs.

## 2024-06-01 NOTE — DISCHARGE PLANNING
Please review the consult from Dr. Pack regarding post acute recommendations.  TCC will no longer follow.  Please reach out to myself with any questions.
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
## 2024-06-01 NOTE — CARE PLAN
The patient is Stable - Low risk of patient condition declining or worsening    Shift Goals  Clinical Goals: pain control, review plan of care, transfer safely  Patient Goals: pain control, rest  Family Goals: not present    Progress made toward(s) clinical / shift goals: Pain control with oral and IV medication, plan of care reviewed at bedside.  Declined slide board transfer to  secondary to pain but encouraged to try later today.       Patient is not progressing towards the following goals:

## 2024-06-01 NOTE — CARE PLAN
Problem: Skin Integrity  Goal: Skin integrity is maintained or improved  Description: Target End Date:  Prior to discharge or change in level of care    Document interventions on Skin Risk/Bienvenido flowsheet groups and corresponding LDA    1.  Assess and monitor skin integrity, appearance and/or temperature  2.  Assess risk factors for impaired skin integrity and/or pressures ulcers  3.  Implement precautions to protect skin integrity in collaboration with interdisciplinary team  4.  Implement pressure ulcer prevention protocol if at risk for skin breakdown  5.  Confirm wound care consult if at risk for skin breakdown  6.  Ensure patient use of pressure relieving devices  (Low air loss bed, waffle overlay, heel protectors, ROHO cushion, etc)  Outcome: Progressing     Problem: Hemodynamics  Goal: Patient's hemodynamics, fluid balance and neurologic status will be stable or improve  Description: Target End Date:  Prior to discharge or change in level of care    Document on Assessment and I/O flowsheet templates    1.  Monitor vital signs, pulse oximetry and cardiac monitor per provider order and/or policy  2.  Maintain blood pressure per provider order  3.  Hemodynamic monitoring per provider order  4.  Manage IV fluids and IV infusions  5.  Monitor intake and output  6.  Daily weights per unit policy or provider order  7.  Assess peripheral pulses and capillary refill  8.  Assess color and body temperature  9.  Position patient for maximum circulation/cardiac output  10. Monitor for signs/symptoms of excessive bleeding  11. Assess mental status, restlessness and changes in level of consciousness  12. Monitor temperature and report fever or hypothermia to provider immediately. Consideration of targeted temperature management.  Outcome: Met   The patient is Stable - Low risk of patient condition declining or worsening    Shift Goals  Clinical Goals: safety, Pain control, safe mobility  Patient Goals: Pain control,  rest, comfort  Family Goals: N/A    Progress made toward(s) clinical / shift goals:     Pt. Vital signs WDL.   Pt. Has no new skin integrity issue/ impairment.   Pt. Didn't complain of pain under RN care.

## 2024-06-02 PROCEDURE — 700102 HCHG RX REV CODE 250 W/ 637 OVERRIDE(OP)

## 2024-06-02 PROCEDURE — 770001 HCHG ROOM/CARE - MED/SURG/GYN PRIV*

## 2024-06-02 PROCEDURE — 700102 HCHG RX REV CODE 250 W/ 637 OVERRIDE(OP): Performed by: NURSE PRACTITIONER

## 2024-06-02 PROCEDURE — A9270 NON-COVERED ITEM OR SERVICE: HCPCS

## 2024-06-02 PROCEDURE — 700111 HCHG RX REV CODE 636 W/ 250 OVERRIDE (IP)

## 2024-06-02 PROCEDURE — 99231 SBSQ HOSP IP/OBS SF/LOW 25: CPT | Performed by: NURSE PRACTITIONER

## 2024-06-02 PROCEDURE — 700111 HCHG RX REV CODE 636 W/ 250 OVERRIDE (IP): Mod: JZ | Performed by: PHYSICIAN ASSISTANT

## 2024-06-02 PROCEDURE — A9270 NON-COVERED ITEM OR SERVICE: HCPCS | Performed by: NURSE PRACTITIONER

## 2024-06-02 PROCEDURE — 700101 HCHG RX REV CODE 250: Performed by: PHYSICIAN ASSISTANT

## 2024-06-02 RX ADMIN — CELECOXIB 100 MG: 100 CAPSULE ORAL at 17:41

## 2024-06-02 RX ADMIN — SENNOSIDES AND DOCUSATE SODIUM 1 TABLET: 50; 8.6 TABLET ORAL at 20:31

## 2024-06-02 RX ADMIN — DOCUSATE SODIUM 100 MG: 100 CAPSULE, LIQUID FILLED ORAL at 04:54

## 2024-06-02 RX ADMIN — DOCUSATE SODIUM 100 MG: 100 CAPSULE, LIQUID FILLED ORAL at 17:41

## 2024-06-02 RX ADMIN — OXYCODONE HYDROCHLORIDE 15 MG: 15 TABLET ORAL at 04:54

## 2024-06-02 RX ADMIN — ENOXAPARIN SODIUM 40 MG: 100 INJECTION SUBCUTANEOUS at 17:40

## 2024-06-02 RX ADMIN — OXYCODONE HYDROCHLORIDE 15 MG: 15 TABLET ORAL at 14:19

## 2024-06-02 RX ADMIN — METAXALONE 800 MG: 800 TABLET ORAL at 10:51

## 2024-06-02 RX ADMIN — CELECOXIB 100 MG: 100 CAPSULE ORAL at 04:54

## 2024-06-02 RX ADMIN — METAXALONE 800 MG: 800 TABLET ORAL at 17:41

## 2024-06-02 RX ADMIN — GABAPENTIN 300 MG: 300 CAPSULE ORAL at 14:19

## 2024-06-02 RX ADMIN — GABAPENTIN 300 MG: 300 CAPSULE ORAL at 20:31

## 2024-06-02 RX ADMIN — GABAPENTIN 300 MG: 300 CAPSULE ORAL at 04:54

## 2024-06-02 RX ADMIN — ENOXAPARIN SODIUM 40 MG: 100 INJECTION SUBCUTANEOUS at 04:56

## 2024-06-02 RX ADMIN — OXYCODONE HYDROCHLORIDE 15 MG: 15 TABLET ORAL at 10:51

## 2024-06-02 RX ADMIN — HYDROMORPHONE HYDROCHLORIDE 0.25 MG: 1 INJECTION, SOLUTION INTRAMUSCULAR; INTRAVENOUS; SUBCUTANEOUS at 00:06

## 2024-06-02 RX ADMIN — OXYCODONE HYDROCHLORIDE 15 MG: 15 TABLET ORAL at 01:30

## 2024-06-02 RX ADMIN — LIDOCAINE 3 PATCH: 4 PATCH TOPICAL at 05:01

## 2024-06-02 RX ADMIN — DULOXETINE HYDROCHLORIDE 30 MG: 30 CAPSULE, DELAYED RELEASE ORAL at 04:54

## 2024-06-02 RX ADMIN — METAXALONE 800 MG: 800 TABLET ORAL at 04:54

## 2024-06-02 RX ADMIN — HYDROMORPHONE HYDROCHLORIDE 0.25 MG: 1 INJECTION, SOLUTION INTRAMUSCULAR; INTRAVENOUS; SUBCUTANEOUS at 20:34

## 2024-06-02 RX ADMIN — HYDROMORPHONE HYDROCHLORIDE 0.25 MG: 1 INJECTION, SOLUTION INTRAMUSCULAR; INTRAVENOUS; SUBCUTANEOUS at 06:11

## 2024-06-02 RX ADMIN — HYDROMORPHONE HYDROCHLORIDE 0.25 MG: 1 INJECTION, SOLUTION INTRAMUSCULAR; INTRAVENOUS; SUBCUTANEOUS at 12:32

## 2024-06-02 RX ADMIN — OXYCODONE HYDROCHLORIDE 15 MG: 15 TABLET ORAL at 17:41

## 2024-06-02 ASSESSMENT — PAIN DESCRIPTION - PAIN TYPE
TYPE: ACUTE PAIN
TYPE: ACUTE PAIN;SURGICAL PAIN
TYPE: SURGICAL PAIN;ACUTE PAIN
TYPE: ACUTE PAIN;SURGICAL PAIN

## 2024-06-02 NOTE — PROGRESS NOTES
"Telerange hinged knee brace corrected to better fit patient's RLE. All straps tightened to help with the brace reportedly \"falling off\" repeatedly through the night.     Contact traction with any questions or concerns regarding the use of this brace.   "

## 2024-06-02 NOTE — PROGRESS NOTES
"      Orthopaedic Progress Note    Interval changes:  Patient doing well    LLE dressings are CDI  Left foot surgery scheduled for thurs 6/6 pending soft tissue issue resolution    ROS - Patient denies any new issues.  Pain well controlled.    /77   Pulse 80   Temp 36.5 °C (97.7 °F) (Temporal)   Resp 17   Ht 1.753 m (5' 9.02\")   Wt 77.1 kg (170 lb)   SpO2 97%     Patient seen and examined  No acute distress  Breathing non labored  RRR  BLE in cam boots, DNVI, moves all toes, cap refill <2 sec.     Recent Labs     05/31/24  1159   WBC 10.2   RBC 3.51*   HEMOGLOBIN 10.3*   HEMATOCRIT 29.1*   MCV 82.9   MCH 29.3   MCHC 35.4   RDW 39.8   PLATELETCT 297   MPV 10.1       Active Hospital Problems    Diagnosis     Discharge planning issues [Z75.8]      Priority: High    Closed fracture of right tibial plateau [S82.141A]      Priority: High    Fracture of rib of left side [S22.32XA]      Priority: Medium    Multiple fractures of left foot, closed, initial encounter [S92.902A]      Priority: Medium    Closed left ankle fracture [S82.892A]      Priority: Medium    Sacral fracture, closed (HCC) [S32.10XA]      Priority: Medium    Multiple lacerations [T07.XXXA]      Priority: Medium    Multiple closed fractures of metacarpal bones [S62.309A]      Priority: Medium    Closed fracture of right scapula [S42.101A]      Priority: Medium    Closed fracture of right ankle [S82.891A]      Priority: Medium    Evaluation by psychiatric service required [Z00.8]      Priority: Low    Trauma [T14.90XA]      Priority: Low    No contraindication to deep vein thrombosis (DVT) prophylaxis [Z78.9]      Priority: Low    Compression fracture of T8 vertebra (HCC) [S22.060A]      Priority: Low       Assessment/Plan:  Patient doing well    LLE dressings are CDI  Left foot surgery scheduled for thurs 6/6 pending soft tissue issue resolution  POD#3 S/P Closed treatment with manipulation, left fourth and fifth metacarpal fractures and " application of short arm splint.  POD#9 S/P:  1.  Open treatment and internal fixation of left bimalleolar ankle fracture.  2.  Open treatment and internal fixation of left distal tibiofibular joint disruption.  3.  Excisional debridement of left leg laceration measuring 5x1.5 cm including skin and subcutaneous tissue.  4.  Layered repair of intermediate complexity laceration measuring 5 cm, left leg.   5. Closed treatment without manipulation of right talar dome fracture  6. Closed treatment without manipulation of right calcaneus fracture  7. Closed treatment without manipulation of right tibia plateau/eminence fracture  8. Closed treatment without manipulation of right lateral malleolus fracture  9. Closed treatment without manipulation of left sacrum and anterior pelvis fractures  Wt bearing status - NWB BLE  Wound care/Drains - Dressings to be changed every other day by nursing. Or PRN for saturation starting POD#2  Future Procedures - pending resolution of soft tissue swelling and blisters   Lovenox: Start 5/23, Duration-until ambulatory > 150'  Sutures/Staples out- 14-21 days post operatively. Removal will completed by ortho mid levels only.  PT/OT-initiated  Antibiotics: Perioperative completed  DVT Prophylaxis- TEDS/SCDs/Foot pumps  Campos-not needed per ortho  Case Coordination for Discharge Planning - Disposition per therapy recs.

## 2024-06-02 NOTE — CARE PLAN
The patient is Stable - Low risk of patient condition declining or worsening    Shift Goals  Clinical Goals: Pain control, transfer safety, review of plan of care  Patient Goals: pain control, rest, comfort  Family Goals: N/A    Progress made toward(s) clinical / shift goals:    Problem: Skin Integrity  Goal: Skin integrity is maintained or improved  Outcome: Progressing     Problem: Discharge Barriers/Planning  Goal: Patient's continuum of care needs are met  Outcome: Progressing     Problem: Self Care  Goal: Patient will have the ability to perform ADLs independently or with assistance (bathe, groom, dress, toilet and feed)  Outcome: Progressing     Problem: Wound/ / Incision Healing  Goal: Patient's wound/surgical incision will decrease in size and heals properly  Outcome: Progressing       Patient is not progressing towards the following goals:

## 2024-06-02 NOTE — CARE PLAN
Problem: Skin Integrity  Goal: Skin integrity is maintained or improved  Description: Target End Date:  Prior to discharge or change in level of care    Document interventions on Skin Risk/Bienvenido flowsheet groups and corresponding LDA    1.  Assess and monitor skin integrity, appearance and/or temperature  2.  Assess risk factors for impaired skin integrity and/or pressures ulcers  3.  Implement precautions to protect skin integrity in collaboration with interdisciplinary team  4.  Implement pressure ulcer prevention protocol if at risk for skin breakdown  5.  Confirm wound care consult if at risk for skin breakdown  6.  Ensure patient use of pressure relieving devices  (Low air loss bed, waffle overlay, heel protectors, ROHO cushion, etc)  Outcome: Progressing     Problem: Psychosocial  Goal: Patient's level of anxiety will decrease  Description: Target End Date:  1-3 days or as soon as patient condition allows    1.  Collaborate with patient and family/caregiver to identify triggers and develop strategies to cope with anxiety  2.  Implement stimuli reduction, calming techniques  3.  Pharmacologic management per provider order  4.  Encourage patient/family/care giver participation  5.  Collaborate with interdisciplinary team including Psychologist or Behavioral Health Team as needed  Outcome: Met     Problem: Infection - Standard  Goal: Patient will remain free from infection  Description: Target End Date:  Prior to discharge or change in level of care    1.  Utilize Standard Precautions at all times to reduce the risk of transmission of microorganisms from both recognized and  unrecognized sources of infection  2.  Infection prevention handouts provided (general/device/diagnosis specific) and documented in Patient Education  3.  Educate patient and family/caregiver on isolation precautions if applicable  Outcome: Met   The patient is Stable - Low risk of patient condition declining or worsening    Shift  Goals  Clinical Goals: Pain control, transfer safety, review of plan of care  Patient Goals: pain control, rest, comfort  Family Goals: N/A    Progress made toward(s) clinical / shift goals:      Pt. Vital signs WDL.   Pt. Has no new skin integrity issue/ impairment.   Pt. Verbalized understanding on the care provided.   Pt. Encouraged to verbalized feelings.
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
## 2024-06-03 LAB
ANION GAP SERPL CALC-SCNC: 14 MMOL/L (ref 7–16)
ANION GAP SERPL CALC-SCNC: 14 MMOL/L (ref 7–16)
BASOPHILS # BLD AUTO: 0.3 % (ref 0–1.8)
BASOPHILS # BLD AUTO: 0.3 % (ref 0–1.8)
BASOPHILS # BLD: 0.03 K/UL (ref 0–0.12)
BASOPHILS # BLD: 0.03 K/UL (ref 0–0.12)
BUN SERPL-MCNC: 18 MG/DL (ref 8–22)
BUN SERPL-MCNC: 18 MG/DL (ref 8–22)
CALCIUM SERPL-MCNC: 9.3 MG/DL (ref 8.5–10.5)
CALCIUM SERPL-MCNC: 9.3 MG/DL (ref 8.5–10.5)
CHLORIDE SERPL-SCNC: 103 MMOL/L (ref 96–112)
CHLORIDE SERPL-SCNC: 103 MMOL/L (ref 96–112)
CO2 SERPL-SCNC: 19 MMOL/L (ref 20–33)
CO2 SERPL-SCNC: 19 MMOL/L (ref 20–33)
CREAT SERPL-MCNC: 0.75 MG/DL (ref 0.5–1.4)
CREAT SERPL-MCNC: 0.75 MG/DL (ref 0.5–1.4)
EOSINOPHIL # BLD AUTO: 0.25 K/UL (ref 0–0.51)
EOSINOPHIL # BLD AUTO: 0.25 K/UL (ref 0–0.51)
EOSINOPHIL NFR BLD: 2.4 % (ref 0–6.9)
EOSINOPHIL NFR BLD: 2.4 % (ref 0–6.9)
ERYTHROCYTE [DISTWIDTH] IN BLOOD BY AUTOMATED COUNT: 39.7 FL (ref 35.9–50)
ERYTHROCYTE [DISTWIDTH] IN BLOOD BY AUTOMATED COUNT: 39.7 FL (ref 35.9–50)
GFR SERPLBLD CREATININE-BSD FMLA CKD-EPI: 122 ML/MIN/1.73 M 2
GFR SERPLBLD CREATININE-BSD FMLA CKD-EPI: 122 ML/MIN/1.73 M 2
GLUCOSE SERPL-MCNC: 98 MG/DL (ref 65–99)
GLUCOSE SERPL-MCNC: 98 MG/DL (ref 65–99)
HCT VFR BLD AUTO: 32.3 % (ref 42–52)
HCT VFR BLD AUTO: 32.3 % (ref 42–52)
HGB BLD-MCNC: 11.1 G/DL (ref 14–18)
HGB BLD-MCNC: 11.1 G/DL (ref 14–18)
IMM GRANULOCYTES # BLD AUTO: 0.27 K/UL (ref 0–0.11)
IMM GRANULOCYTES # BLD AUTO: 0.27 K/UL (ref 0–0.11)
IMM GRANULOCYTES NFR BLD AUTO: 2.6 % (ref 0–0.9)
IMM GRANULOCYTES NFR BLD AUTO: 2.6 % (ref 0–0.9)
LYMPHOCYTES # BLD AUTO: 2.42 K/UL (ref 1–4.8)
LYMPHOCYTES # BLD AUTO: 2.42 K/UL (ref 1–4.8)
LYMPHOCYTES NFR BLD: 23.5 % (ref 22–41)
LYMPHOCYTES NFR BLD: 23.5 % (ref 22–41)
MCH RBC QN AUTO: 28.9 PG (ref 27–33)
MCH RBC QN AUTO: 28.9 PG (ref 27–33)
MCHC RBC AUTO-ENTMCNC: 34.4 G/DL (ref 32.3–36.5)
MCHC RBC AUTO-ENTMCNC: 34.4 G/DL (ref 32.3–36.5)
MCV RBC AUTO: 84.1 FL (ref 81.4–97.8)
MCV RBC AUTO: 84.1 FL (ref 81.4–97.8)
MONOCYTES # BLD AUTO: 0.87 K/UL (ref 0–0.85)
MONOCYTES # BLD AUTO: 0.87 K/UL (ref 0–0.85)
MONOCYTES NFR BLD AUTO: 8.5 % (ref 0–13.4)
MONOCYTES NFR BLD AUTO: 8.5 % (ref 0–13.4)
NEUTROPHILS # BLD AUTO: 6.45 K/UL (ref 1.82–7.42)
NEUTROPHILS # BLD AUTO: 6.45 K/UL (ref 1.82–7.42)
NEUTROPHILS NFR BLD: 62.7 % (ref 44–72)
NEUTROPHILS NFR BLD: 62.7 % (ref 44–72)
NRBC # BLD AUTO: 0 K/UL
NRBC # BLD AUTO: 0 K/UL
NRBC BLD-RTO: 0 /100 WBC (ref 0–0.2)
NRBC BLD-RTO: 0 /100 WBC (ref 0–0.2)
PLATELET # BLD AUTO: 441 K/UL (ref 164–446)
PLATELET # BLD AUTO: 441 K/UL (ref 164–446)
PMV BLD AUTO: 9.7 FL (ref 9–12.9)
PMV BLD AUTO: 9.7 FL (ref 9–12.9)
POTASSIUM SERPL-SCNC: 4.1 MMOL/L (ref 3.6–5.5)
POTASSIUM SERPL-SCNC: 4.1 MMOL/L (ref 3.6–5.5)
RBC # BLD AUTO: 3.84 M/UL (ref 4.7–6.1)
RBC # BLD AUTO: 3.84 M/UL (ref 4.7–6.1)
SODIUM SERPL-SCNC: 136 MMOL/L (ref 135–145)
SODIUM SERPL-SCNC: 136 MMOL/L (ref 135–145)
WBC # BLD AUTO: 10.3 K/UL (ref 4.8–10.8)
WBC # BLD AUTO: 10.3 K/UL (ref 4.8–10.8)

## 2024-06-03 PROCEDURE — 700111 HCHG RX REV CODE 636 W/ 250 OVERRIDE (IP): Mod: JZ | Performed by: PHYSICIAN ASSISTANT

## 2024-06-03 PROCEDURE — 700102 HCHG RX REV CODE 250 W/ 637 OVERRIDE(OP)

## 2024-06-03 PROCEDURE — 770001 HCHG ROOM/CARE - MED/SURG/GYN PRIV*

## 2024-06-03 PROCEDURE — A9270 NON-COVERED ITEM OR SERVICE: HCPCS

## 2024-06-03 PROCEDURE — 85025 COMPLETE CBC W/AUTO DIFF WBC: CPT

## 2024-06-03 PROCEDURE — A9270 NON-COVERED ITEM OR SERVICE: HCPCS | Performed by: NURSE PRACTITIONER

## 2024-06-03 PROCEDURE — 700111 HCHG RX REV CODE 636 W/ 250 OVERRIDE (IP)

## 2024-06-03 PROCEDURE — 80048 BASIC METABOLIC PNL TOTAL CA: CPT

## 2024-06-03 PROCEDURE — 700102 HCHG RX REV CODE 250 W/ 637 OVERRIDE(OP): Performed by: NURSE PRACTITIONER

## 2024-06-03 PROCEDURE — 97530 THERAPEUTIC ACTIVITIES: CPT

## 2024-06-03 PROCEDURE — 99231 SBSQ HOSP IP/OBS SF/LOW 25: CPT | Performed by: NURSE PRACTITIONER

## 2024-06-03 PROCEDURE — 97535 SELF CARE MNGMENT TRAINING: CPT

## 2024-06-03 PROCEDURE — 36415 COLL VENOUS BLD VENIPUNCTURE: CPT

## 2024-06-03 RX ADMIN — HYDROMORPHONE HYDROCHLORIDE 0.25 MG: 1 INJECTION, SOLUTION INTRAMUSCULAR; INTRAVENOUS; SUBCUTANEOUS at 04:22

## 2024-06-03 RX ADMIN — OXYCODONE HYDROCHLORIDE 15 MG: 15 TABLET ORAL at 14:25

## 2024-06-03 RX ADMIN — GABAPENTIN 300 MG: 300 CAPSULE ORAL at 04:22

## 2024-06-03 RX ADMIN — OXYCODONE HYDROCHLORIDE 15 MG: 15 TABLET ORAL at 01:49

## 2024-06-03 RX ADMIN — DOCUSATE SODIUM 100 MG: 100 CAPSULE, LIQUID FILLED ORAL at 04:22

## 2024-06-03 RX ADMIN — GABAPENTIN 300 MG: 300 CAPSULE ORAL at 14:25

## 2024-06-03 RX ADMIN — ENOXAPARIN SODIUM 40 MG: 100 INJECTION SUBCUTANEOUS at 04:22

## 2024-06-03 RX ADMIN — OXYCODONE HYDROCHLORIDE 15 MG: 15 TABLET ORAL at 20:08

## 2024-06-03 RX ADMIN — ENOXAPARIN SODIUM 40 MG: 100 INJECTION SUBCUTANEOUS at 16:54

## 2024-06-03 RX ADMIN — METAXALONE 800 MG: 800 TABLET ORAL at 11:23

## 2024-06-03 RX ADMIN — HYDROMORPHONE HYDROCHLORIDE 0.25 MG: 1 INJECTION, SOLUTION INTRAMUSCULAR; INTRAVENOUS; SUBCUTANEOUS at 11:23

## 2024-06-03 RX ADMIN — HYDROMORPHONE HYDROCHLORIDE 0.25 MG: 1 INJECTION, SOLUTION INTRAMUSCULAR; INTRAVENOUS; SUBCUTANEOUS at 22:09

## 2024-06-03 RX ADMIN — METAXALONE 800 MG: 800 TABLET ORAL at 04:22

## 2024-06-03 RX ADMIN — GABAPENTIN 300 MG: 300 CAPSULE ORAL at 22:09

## 2024-06-03 RX ADMIN — CELECOXIB 100 MG: 100 CAPSULE ORAL at 04:22

## 2024-06-03 RX ADMIN — DULOXETINE HYDROCHLORIDE 30 MG: 30 CAPSULE, DELAYED RELEASE ORAL at 04:22

## 2024-06-03 RX ADMIN — OXYCODONE HYDROCHLORIDE 15 MG: 15 TABLET ORAL at 08:29

## 2024-06-03 RX ADMIN — CELECOXIB 100 MG: 100 CAPSULE ORAL at 16:54

## 2024-06-03 RX ADMIN — METAXALONE 800 MG: 800 TABLET ORAL at 16:54

## 2024-06-03 ASSESSMENT — COGNITIVE AND FUNCTIONAL STATUS - GENERAL
PERSONAL GROOMING: A LITTLE
STANDING UP FROM CHAIR USING ARMS: TOTAL
SUGGESTED CMS G CODE MODIFIER MOBILITY: CL
CLIMB 3 TO 5 STEPS WITH RAILING: TOTAL
DAILY ACTIVITIY SCORE: 16
SUGGESTED CMS G CODE MODIFIER DAILY ACTIVITY: CK
DRESSING REGULAR UPPER BODY CLOTHING: A LITTLE
TURNING FROM BACK TO SIDE WHILE IN FLAT BAD: A LITTLE
DRESSING REGULAR LOWER BODY CLOTHING: A LOT
HELP NEEDED FOR BATHING: A LOT
WALKING IN HOSPITAL ROOM: TOTAL
MOBILITY SCORE: 12
MOVING FROM LYING ON BACK TO SITTING ON SIDE OF FLAT BED: A LITTLE
MOVING TO AND FROM BED TO CHAIR: A LITTLE
TOILETING: A LOT

## 2024-06-03 ASSESSMENT — ENCOUNTER SYMPTOMS
ROS GI COMMENTS: BM 6/1
MYALGIAS: 1
NEUROLOGICAL NEGATIVE: 1
RESPIRATORY NEGATIVE: 1
PSYCHIATRIC NEGATIVE: 1

## 2024-06-03 ASSESSMENT — PAIN DESCRIPTION - PAIN TYPE
TYPE: SURGICAL PAIN

## 2024-06-03 NOTE — THERAPY
"Physical Therapy   Daily Treatment     Patient Name: Hayden Manzanares II  Age:  33 y.o., Sex:  male  Medical Record #: [id number]  Today's Date: 6/3/2024     Precautions  Precautions: Fall Risk;Non Weight Bearing Right Lower Extremity;Non Weight Bearing Left Lower Extremity;Platform Weight Bearing Left Upper Extremity;Weight Bearing As Tolerated Right Upper Extremity;Immobilizer Right Lower Extremity  Comments: R HKB at 20 deg, B CAM boot    Assessment    Pt received up in the wheelchair and agreeable to PT session. Primary focus of session was on wheelchair mobility. Pt required intermittent min A for navigating in tighter spaces due to size of reclining wheelchair with elevating leg rests. Pt limited with distance due to B shoulder pain. Pt continues to make progress towards return home with parents, if they are able to provide assist needed currently. Will follow for acute PT to progress as able.    Plan    Treatment Plan Status: Continue Current Treatment Plan  Type of Treatment: Bed Mobility, Equipment, Family / Caregiver Training, Neuro Re-Education / Balance, Self Care / Home Evaluation, Therapeutic Activities, Therapeutic Exercise  Treatment Frequency: 4 Times per Week  Treatment Duration: Until Therapy Goals Met    DC Equipment Recommendations:  (18\" wheelchair with elevating leg rests and removable arm rests, 36\" slide board)  Discharge Recommendations:  (Currently would recommend placement for further rehab, may progress to home with parents if they are able to provide level of assist pt needs)    Subjective    \"Can we go see my fiance\"     Objective       06/03/24 1505   Precautions   Precautions Fall Risk;Non Weight Bearing Right Lower Extremity;Non Weight Bearing Left Lower Extremity;Platform Weight Bearing Left Upper Extremity;Weight Bearing As Tolerated Right Upper Extremity;Immobilizer Right Lower Extremity   Comments R HKB at 20 deg, B CAM boot   Vitals   O2 Delivery Device None - Room Air   Pain 0 - 10 " Group   Therapist Pain Assessment Post Activity Pain Same as Prior to Activity;Nurse Notified  (reports pain in B shoulders, not rated)   Cognition    Level of Consciousness Alert   Comments Pleasant and cooperative   Balance   Sitting Balance (Static) Fair   Sitting Balance (Dynamic) Fair   Skilled Intervention Verbal Cuing   Functional Mobility   Mobility up in wheelchair   Wheelchair Assist Minimal Assist   Distance Wheelchair (Feet or Distance) 100x2 with rest break   Skilled Intervention Verbal Cuing;Sequencing;Compensatory Strategies   Comments Propelled wheelchair with BUE, ok to use hand for propulsion in cast/splint per trauma. Required assist with brakes and due to fatigue.   6 Clicks Assessment - How much HELP from from another person do you currently need... (If the patient hasn't done an activity recently, how much help from another person do you think he/she would need if he/she tried?)   Turning from your back to your side while in a flat bed without using bedrails? 3   Moving from lying on your back to sitting on the side of a flat bed without using bedrails? 3   Moving to and from a bed to a chair (including a wheelchair)? 3   Standing up from a chair using your arms (e.g., wheelchair, or bedside chair)? 1   Walking in hospital room? 1   Climbing 3-5 steps with a railing? 1   6 clicks Mobility Score 12   Short Term Goals    Short Term Goal # 1 Pt will perform bed mobility from a flat bed with supervision to progress to home in 6 visits.   Goal Outcome # 1 Goal met   Short Term Goal # 2 Pt will transfer via slide board to the wheelchair with supervision to progress independence in 6 visits.   Goal Outcome # 2 Progressing as expected   Short Term Goal # 3 Pt will propel manual wheelchair 100ft with supervision for short household distances in 6 visits.   Goal Outcome # 3 Progressing as expected   Physical Therapy Treatment Plan   Physical Therapy Treatment Plan Continue Current Treatment Plan  "  Anticipated Discharge Equipment and Recommendations   DC Equipment Recommendations   (18\" wheelchair with elevating leg rests and removable arm rests, 36\" slide board)   Discharge Recommendations   (Currently would recommend placement for further rehab, may progress to home with parents if they are able to provide level of assist pt needs)   Interdisciplinary Plan of Care Collaboration   IDT Collaboration with  Nursing   Patient Position at End of Therapy Seated  (in wheelchair with ACT present, visiting with SO on GSU)   Collaboration Comments RN updated.   Session Information   Date / Session Number  6/3-3 (2/4, 6/9)           "

## 2024-06-03 NOTE — PROGRESS NOTES
"      Orthopaedic Progress Note    Interval changes:  Patient doing well    LLE dressings are CDI  Left foot surgery scheduled for thurs 6/6 pending soft tissue issue resolution    ROS - Patient denies any new issues.  Pain well controlled.    /74   Pulse 76   Temp 36.6 °C (97.9 °F) (Temporal)   Resp 17   Ht 1.753 m (5' 9.02\")   Wt 77.1 kg (170 lb)   SpO2 99%     Patient seen and examined  No acute distress  Breathing non labored  RRR  BLE in cam boots, DNVI, moves all toes, cap refill <2 sec.     Recent Labs     06/03/24  0834   WBC 10.3   RBC 3.84*   HEMOGLOBIN 11.1*   HEMATOCRIT 32.3*   MCV 84.1   MCH 28.9   MCHC 34.4   RDW 39.7   PLATELETCT 441   MPV 9.7       Active Hospital Problems    Diagnosis     Discharge planning issues [Z75.8]      Priority: High    Closed fracture of right tibial plateau [S82.141A]      Priority: High    Fracture of rib of left side [S22.32XA]      Priority: Medium    Multiple fractures of left foot, closed, initial encounter [S92.902A]      Priority: Medium    Closed left ankle fracture [S82.892A]      Priority: Medium    Sacral fracture, closed (HCC) [S32.10XA]      Priority: Medium    Multiple lacerations [T07.XXXA]      Priority: Medium    Multiple closed fractures of metacarpal bones [S62.309A]      Priority: Medium    Closed fracture of right scapula [S42.101A]      Priority: Medium    Closed fracture of right ankle [S82.891A]      Priority: Medium    Evaluation by psychiatric service required [Z00.8]      Priority: Low    Trauma [T14.90XA]      Priority: Low    No contraindication to deep vein thrombosis (DVT) prophylaxis [Z78.9]      Priority: Low    Compression fracture of T8 vertebra (HCC) [S22.060A]      Priority: Low       Assessment/Plan:  Patient doing well    LLE dressings are CDI  Left foot surgery scheduled for thurs 6/6 pending soft tissue issue resolution  POD#4 S/P Closed treatment with manipulation, left fourth and fifth metacarpal fractures and " application of short arm splint.  POD#10 S/P:  1.  Open treatment and internal fixation of left bimalleolar ankle fracture.  2.  Open treatment and internal fixation of left distal tibiofibular joint disruption.  3.  Excisional debridement of left leg laceration measuring 5x1.5 cm including skin and subcutaneous tissue.  4.  Layered repair of intermediate complexity laceration measuring 5 cm, left leg.   5. Closed treatment without manipulation of right talar dome fracture  6. Closed treatment without manipulation of right calcaneus fracture  7. Closed treatment without manipulation of right tibia plateau/eminence fracture  8. Closed treatment without manipulation of right lateral malleolus fracture  9. Closed treatment without manipulation of left sacrum and anterior pelvis fractures  Wt bearing status - NWB BLE  Wound care/Drains - Dressings to be changed every other day by nursing. Or PRN for saturation starting POD#2  Future Procedures - pending resolution of soft tissue swelling and blisters   Lovenox: Start 5/23, Duration-until ambulatory > 150'  Sutures/Staples out- 14-21 days post operatively. Removal will completed by ortho mid levels only.  PT/OT-initiated  Antibiotics: Perioperative completed  DVT Prophylaxis- TEDS/SCDs/Foot pumps  Campos-not needed per ortho  Case Coordination for Discharge Planning - Disposition per therapy recs.
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
## 2024-06-03 NOTE — THERAPY
Occupational Therapy  Daily Treatment     Patient Name: Hayden Manzanares II  Age:  33 y.o., Sex:  male  Medical Record #: [id number]  Today's Date: 6/3/2024     Precautions: Fall Risk, Non Weight Bearing Right Lower Extremity, Non Weight Bearing Left Lower Extremity, Platform Weight Bearing Left Upper Extremity, Weight Bearing As Tolerated Right Upper Extremity, Immobilizer Right Lower Extremity  Comments: R HKB at 20 deg, B CAM boot    Assessment    Pt seen for OT tx. Pt now s/p closed tx with manipulation of left 4th and 5th metacarpal fx and application of short arm splint. Pt demo progress towards OT goals, but is currently limited by WB status, balance, functional mobility, strength, and activity tolerance. Pt was provided education on kalie-dressing techniques to don hospital pants, but required max A to manage CAM boots and hinged knee brace. Pt able to complete lateral txf into WC with SBA. Pt politely declined practice completing txf to standard toilet. Pt reported that he would like to DC to home to his parent's house (which he has never been to before) and encouraged him to have his parent's send him pictures of the bathroom he would likely be using to help assist with practicing ADL txfs. Continue to recommend post-acute placement prior to DC home, but pt may progress while in house, if he is able to DC to his parent's house. Will continue to follow for ongoing acute OT services.     Plan    Treatment Plan Status: Continue Current Treatment Plan  Type of Treatment: Self Care / Activities of Daily Living, Adaptive Equipment, Therapeutic Exercises, Therapeutic Activity  Treatment Frequency: 3 Times per Week  Treatment Duration: Until Therapy Goals Met    DC Equipment Recommendations: Unable to determine at this time  Discharge Recommendations: Recommend post-acute placement for additional occupational therapy services prior to discharge home     Objective      Vitals   O2 Delivery Device None - Room Air   Pain 0  - 10 Group   Therapist Pain Assessment Post Activity Pain Same as Prior to Activity;Nurse Notified  (Not rated, reported an increase in B shoulder pain with activity)   Cognition    Level of Consciousness Alert   Comments Pleasant and cooperative   Balance   Sitting Balance (Static) Fair   Sitting Balance (Dynamic) Fair   Weight Shift Sitting Fair   Skilled Intervention Verbal Cuing;Tactile Cuing;Sequencing;Compensatory Strategies   Comments Side scoots into WC   Bed Mobility    Supine to Sit Supervised   Sit to Supine   (Up in WC post)   Scooting Supervised   Skilled Intervention Verbal Cuing   Comments HOB flat, pt able to come into long sitting   Activities of Daily Living   Eating Supervision   Grooming Supervision;Seated   Upper Body Dressing Minimal Assist   Lower Body Dressing Maximal Assist  ([facility] pants; managing B CAM boots and hinged knee brace. Edu on kalie-dressing techniques)   Toileting Maximal Assist   Skilled Intervention Verbal Cuing;Tactile Cuing;Sequencing;Compensatory Strategies   How much help from another person does the patient currently need...   6 Clicks Daily Activity Score 16   Functional Mobility   Sit to Stand Unable to Participate   Bed, Chair, Wheelchair Transfer Standby Assist   Transfer Method Sit Pivot   Mobility EOB > WC   Skilled Intervention Verbal Cuing;Tactile Cuing;Compensatory Strategies   Comments Requires assist with WC set up   Activity Tolerance   Sitting in Chair 10+ min   Sitting Edge of Bed 5   Patient / Family Goals   Patient / Family Goal #1 To go home   Goal #1 Outcome Progressing as expected   Short Term Goals   Short Term Goal # 1 Pt will complete ADL txfs with supv   Goal Outcome # 1 Progressing as expected   Short Term Goal # 2 Pt will complete LB dressing with supv using AE PRN   Goal Outcome # 2 Progressing as expected   Short Term Goal # 3 Pt will complete toileting ADLs with CGA   Goal Outcome # 3 Progressing as expected   Education Group    Education Provided Role of Occupational Therapist;Activities of Daily Living;Weight Bearing Precautions;Pathology of bedrest   Role of Occupational Therapist Patient Response Patient;Acceptance;Explanation;Verbal Demonstration   ADL Patient Response Patient;Acceptance;Explanation;Reinforcement Needed   Weight Bearing Precautions Patient Response Patient;Acceptance;Explanation;Verbal Demonstration;Action Demonstration   Pathology of Bedrest Patient Response Patient;Acceptance;Explanation;Verbal Demonstration

## 2024-06-03 NOTE — CARE PLAN
The patient is Stable - Low risk of patient condition declining or worsening    Shift Goals  Clinical Goals: pain mgmt, safety  Patient Goals: pain control, rest, comfort  Family Goals: not present    Progress made toward(s) clinical / shift goals:        Problem: Skin Integrity  Goal: Skin integrity is maintained or improved  Outcome: Progressing     Problem: Communication  Goal: The ability to communicate needs accurately and effectively will improve  Outcome: Progressing     Problem: Bowel Elimination  Goal: Establish and maintain regular bowel function  Outcome: Progressing     Problem: Self Care  Goal: Patient will have the ability to perform ADLs independently or with assistance (bathe, groom, dress, toilet and feed)  Outcome: Progressing     Problem: Wound/ / Incision Healing  Goal: Patient's wound/surgical incision will decrease in size and heals properly  Outcome: Progressing       Patient is not progressing towards the following goals:

## 2024-06-03 NOTE — CARE PLAN
The patient is Stable - Low risk of patient condition declining or worsening    Shift Goals  Clinical Goals: pain management  Patient Goals: pain  Family Goals: not present    Progress made toward(s) clinical / shift goals:    Problem: Skin Integrity  Goal: Skin integrity is maintained or improved  Outcome: Progressing     Problem: Bowel Elimination  Goal: Establish and maintain regular bowel function  Outcome: Progressing     Problem: Self Care  Goal: Patient will have the ability to perform ADLs independently or with assistance (bathe, groom, dress, toilet and feed)  Outcome: Progressing     Problem: Wound/ / Incision Healing  Goal: Patient's wound/surgical incision will decrease in size and heals properly  Outcome: Progressing       Patient is not progressing towards the following goals:

## 2024-06-04 PROCEDURE — 700102 HCHG RX REV CODE 250 W/ 637 OVERRIDE(OP)

## 2024-06-04 PROCEDURE — A9270 NON-COVERED ITEM OR SERVICE: HCPCS

## 2024-06-04 PROCEDURE — 700111 HCHG RX REV CODE 636 W/ 250 OVERRIDE (IP)

## 2024-06-04 PROCEDURE — 770001 HCHG ROOM/CARE - MED/SURG/GYN PRIV*

## 2024-06-04 PROCEDURE — 99231 SBSQ HOSP IP/OBS SF/LOW 25: CPT | Performed by: NURSE PRACTITIONER

## 2024-06-04 PROCEDURE — 700111 HCHG RX REV CODE 636 W/ 250 OVERRIDE (IP): Mod: JZ | Performed by: PHYSICIAN ASSISTANT

## 2024-06-04 PROCEDURE — 97535 SELF CARE MNGMENT TRAINING: CPT

## 2024-06-04 PROCEDURE — A9270 NON-COVERED ITEM OR SERVICE: HCPCS | Performed by: NURSE PRACTITIONER

## 2024-06-04 PROCEDURE — 97530 THERAPEUTIC ACTIVITIES: CPT

## 2024-06-04 PROCEDURE — 700102 HCHG RX REV CODE 250 W/ 637 OVERRIDE(OP): Performed by: NURSE PRACTITIONER

## 2024-06-04 RX ADMIN — ENOXAPARIN SODIUM 40 MG: 100 INJECTION SUBCUTANEOUS at 16:40

## 2024-06-04 RX ADMIN — HYDROMORPHONE HYDROCHLORIDE 0.25 MG: 1 INJECTION, SOLUTION INTRAMUSCULAR; INTRAVENOUS; SUBCUTANEOUS at 14:40

## 2024-06-04 RX ADMIN — DULOXETINE HYDROCHLORIDE 30 MG: 30 CAPSULE, DELAYED RELEASE ORAL at 04:57

## 2024-06-04 RX ADMIN — GABAPENTIN 300 MG: 300 CAPSULE ORAL at 14:41

## 2024-06-04 RX ADMIN — HYDROMORPHONE HYDROCHLORIDE 0.25 MG: 1 INJECTION, SOLUTION INTRAMUSCULAR; INTRAVENOUS; SUBCUTANEOUS at 07:38

## 2024-06-04 RX ADMIN — OXYCODONE HYDROCHLORIDE 15 MG: 15 TABLET ORAL at 04:56

## 2024-06-04 RX ADMIN — ENOXAPARIN SODIUM 40 MG: 100 INJECTION SUBCUTANEOUS at 04:57

## 2024-06-04 RX ADMIN — GABAPENTIN 300 MG: 300 CAPSULE ORAL at 21:24

## 2024-06-04 RX ADMIN — METAXALONE 800 MG: 800 TABLET ORAL at 04:57

## 2024-06-04 RX ADMIN — CELECOXIB 100 MG: 100 CAPSULE ORAL at 04:57

## 2024-06-04 RX ADMIN — METAXALONE 800 MG: 800 TABLET ORAL at 16:41

## 2024-06-04 RX ADMIN — METAXALONE 800 MG: 800 TABLET ORAL at 11:57

## 2024-06-04 RX ADMIN — GABAPENTIN 300 MG: 300 CAPSULE ORAL at 04:57

## 2024-06-04 RX ADMIN — OXYCODONE HYDROCHLORIDE 15 MG: 15 TABLET ORAL at 11:57

## 2024-06-04 RX ADMIN — CELECOXIB 100 MG: 100 CAPSULE ORAL at 16:42

## 2024-06-04 RX ADMIN — OXYCODONE HYDROCHLORIDE 15 MG: 15 TABLET ORAL at 21:24

## 2024-06-04 ASSESSMENT — PAIN DESCRIPTION - PAIN TYPE
TYPE: SURGICAL PAIN
TYPE: ACUTE PAIN;SURGICAL PAIN
TYPE: SURGICAL PAIN;ACUTE PAIN
TYPE: ACUTE PAIN
TYPE: SURGICAL PAIN;ACUTE PAIN

## 2024-06-04 ASSESSMENT — COGNITIVE AND FUNCTIONAL STATUS - GENERAL
TOILETING: A LITTLE
DRESSING REGULAR UPPER BODY CLOTHING: A LITTLE
SUGGESTED CMS G CODE MODIFIER DAILY ACTIVITY: CK
HELP NEEDED FOR BATHING: A LOT
DRESSING REGULAR LOWER BODY CLOTHING: A LOT
DAILY ACTIVITIY SCORE: 18

## 2024-06-04 ASSESSMENT — ENCOUNTER SYMPTOMS
RESPIRATORY NEGATIVE: 1
NEUROLOGICAL NEGATIVE: 1
MYALGIAS: 1
PSYCHIATRIC NEGATIVE: 1
ROS GI COMMENTS: BM 6/3

## 2024-06-04 NOTE — PROGRESS NOTES
1242 Removed sutures anterior right forearm. Removed 6 sutures and flushed area with saline. Covered with 4x4 and tegaderm.

## 2024-06-04 NOTE — CARE PLAN
Problem: Skin Integrity  Goal: Skin integrity is maintained or improved  Description: Target End Date:  Prior to discharge or change in level of care    Document interventions on Skin Risk/Bienvenido flowsheet groups and corresponding LDA    1.  Assess and monitor skin integrity, appearance and/or temperature  2.  Assess risk factors for impaired skin integrity and/or pressures ulcers  3.  Implement precautions to protect skin integrity in collaboration with interdisciplinary team  4.  Implement pressure ulcer prevention protocol if at risk for skin breakdown  5.  Confirm wound care consult if at risk for skin breakdown  6.  Ensure patient use of pressure relieving devices  (Low air loss bed, waffle overlay, heel protectors, ROHO cushion, etc)  Outcome: Progressing     Problem: Self Care  Goal: Patient will have the ability to perform ADLs independently or with assistance (bathe, groom, dress, toilet and feed)  Description: Target End Date:  Prior to discharge or change in level of care    Document on ADL flowsheet    1.  Assess the capability and level of deficiency to perform ADLs  2.  Encourage family/care giver involvement  3.  Provide assistive devices  4.  Consider PT/OT evaluations  5.  Maintain support, give positive feedback, encourage self-care allowing extra time and verbal cuing as needed  6.  Avoid doing something for patients they can do themselves, but provide assistance as needed  7.  Assist in anticipating/planning individual needs  8.  Collaborate with Case Management and  to meet discharge needs  Outcome: Progressing     Problem: Wound/ / Incision Healing  Goal: Patient's wound/surgical incision will decrease in size and heals properly  Description: Target End Date:  Prior to discharge or change in level of care    Document on LDA    1.  Assess and document surgical incision/wound  2.  Provide incision/wound care per policy and/or provider orders  3.  Manage surgical drains per policy  if applicable  4.  Encourage adequate nutrition to promote wound healing  5.  Collaborate with Clinical Dietician  Outcome: Progressing   The patient is Stable - Low risk of patient condition declining or worsening    Shift Goals  Clinical Goals: pain control, safe mobility  Patient Goals: Pain control, rest, comfort  Family Goals: N/A    Progress made toward(s) clinical / shift goals:      Pt. Vital signs WDL. Surgical site has no signs and symptoms of infection.   Pt. Has no new skin integrity issue/ impairment.   Pt. Verbalized understanding on the care provided.   Pt. Didn't fall under RN care. Fall precautions in place.

## 2024-06-04 NOTE — CARE PLAN
The patient is Stable - Low risk of patient condition declining or worsening    Shift Goals  Clinical Goals: Pain, safety  Patient Goals: pain  Family Goals: not present    Progress made toward(s) clinical / shift goals:    Problem: Skin Integrity  Goal: Skin integrity is maintained or improved  Outcome: Progressing     Problem: Discharge Barriers/Planning  Goal: Patient's continuum of care needs are met  Outcome: Progressing  Flowsheets (Taken 6/4/2024 1872)  Continuum of Care Needs:   Assessed for discharge barriers   Communicated discharge barriers to interdisciplinary tream   Involved patient/family/support system in discharge process     Problem: Bowel Elimination  Goal: Establish and maintain regular bowel function  Outcome: Progressing  Flowsheets (Taken 6/4/2024 1704)  Last BM: 06/03/24     Problem: Self Care  Goal: Patient will have the ability to perform ADLs independently or with assistance (bathe, groom, dress, toilet and feed)  Outcome: Progressing     Problem: Wound/ / Incision Healing  Goal: Patient's wound/surgical incision will decrease in size and heals properly  Outcome: Progressing  Note: Dressing clean, dry and intact.        Patient is not progressing towards the following goals:
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
## 2024-06-05 LAB
ANION GAP SERPL CALC-SCNC: 14 MMOL/L (ref 7–16)
ANION GAP SERPL CALC-SCNC: 14 MMOL/L (ref 7–16)
BASOPHILS # BLD AUTO: 0.5 % (ref 0–1.8)
BASOPHILS # BLD AUTO: 0.5 % (ref 0–1.8)
BASOPHILS # BLD: 0.06 K/UL (ref 0–0.12)
BASOPHILS # BLD: 0.06 K/UL (ref 0–0.12)
BUN SERPL-MCNC: 20 MG/DL (ref 8–22)
BUN SERPL-MCNC: 20 MG/DL (ref 8–22)
CALCIUM SERPL-MCNC: 9 MG/DL (ref 8.5–10.5)
CALCIUM SERPL-MCNC: 9 MG/DL (ref 8.5–10.5)
CHLORIDE SERPL-SCNC: 105 MMOL/L (ref 96–112)
CHLORIDE SERPL-SCNC: 105 MMOL/L (ref 96–112)
CO2 SERPL-SCNC: 20 MMOL/L (ref 20–33)
CO2 SERPL-SCNC: 20 MMOL/L (ref 20–33)
CREAT SERPL-MCNC: 0.93 MG/DL (ref 0.5–1.4)
CREAT SERPL-MCNC: 0.93 MG/DL (ref 0.5–1.4)
EOSINOPHIL # BLD AUTO: 0.22 K/UL (ref 0–0.51)
EOSINOPHIL # BLD AUTO: 0.22 K/UL (ref 0–0.51)
EOSINOPHIL NFR BLD: 2 % (ref 0–6.9)
EOSINOPHIL NFR BLD: 2 % (ref 0–6.9)
ERYTHROCYTE [DISTWIDTH] IN BLOOD BY AUTOMATED COUNT: 42 FL (ref 35.9–50)
ERYTHROCYTE [DISTWIDTH] IN BLOOD BY AUTOMATED COUNT: 42 FL (ref 35.9–50)
GFR SERPLBLD CREATININE-BSD FMLA CKD-EPI: 111 ML/MIN/1.73 M 2
GFR SERPLBLD CREATININE-BSD FMLA CKD-EPI: 111 ML/MIN/1.73 M 2
GLUCOSE SERPL-MCNC: 100 MG/DL (ref 65–99)
GLUCOSE SERPL-MCNC: 100 MG/DL (ref 65–99)
HCT VFR BLD AUTO: 33.6 % (ref 42–52)
HCT VFR BLD AUTO: 33.6 % (ref 42–52)
HGB BLD-MCNC: 11.4 G/DL (ref 14–18)
HGB BLD-MCNC: 11.4 G/DL (ref 14–18)
IMM GRANULOCYTES # BLD AUTO: 0.24 K/UL (ref 0–0.11)
IMM GRANULOCYTES # BLD AUTO: 0.24 K/UL (ref 0–0.11)
IMM GRANULOCYTES NFR BLD AUTO: 2.1 % (ref 0–0.9)
IMM GRANULOCYTES NFR BLD AUTO: 2.1 % (ref 0–0.9)
LYMPHOCYTES # BLD AUTO: 2.22 K/UL (ref 1–4.8)
LYMPHOCYTES # BLD AUTO: 2.22 K/UL (ref 1–4.8)
LYMPHOCYTES NFR BLD: 19.7 % (ref 22–41)
LYMPHOCYTES NFR BLD: 19.7 % (ref 22–41)
MCH RBC QN AUTO: 28.7 PG (ref 27–33)
MCH RBC QN AUTO: 28.7 PG (ref 27–33)
MCHC RBC AUTO-ENTMCNC: 33.9 G/DL (ref 32.3–36.5)
MCHC RBC AUTO-ENTMCNC: 33.9 G/DL (ref 32.3–36.5)
MCV RBC AUTO: 84.6 FL (ref 81.4–97.8)
MCV RBC AUTO: 84.6 FL (ref 81.4–97.8)
MONOCYTES # BLD AUTO: 0.89 K/UL (ref 0–0.85)
MONOCYTES # BLD AUTO: 0.89 K/UL (ref 0–0.85)
MONOCYTES NFR BLD AUTO: 7.9 % (ref 0–13.4)
MONOCYTES NFR BLD AUTO: 7.9 % (ref 0–13.4)
NEUTROPHILS # BLD AUTO: 7.65 K/UL (ref 1.82–7.42)
NEUTROPHILS # BLD AUTO: 7.65 K/UL (ref 1.82–7.42)
NEUTROPHILS NFR BLD: 67.8 % (ref 44–72)
NEUTROPHILS NFR BLD: 67.8 % (ref 44–72)
NRBC # BLD AUTO: 0 K/UL
NRBC # BLD AUTO: 0 K/UL
NRBC BLD-RTO: 0 /100 WBC (ref 0–0.2)
NRBC BLD-RTO: 0 /100 WBC (ref 0–0.2)
PLATELET # BLD AUTO: 531 K/UL (ref 164–446)
PLATELET # BLD AUTO: 531 K/UL (ref 164–446)
PMV BLD AUTO: 9.6 FL (ref 9–12.9)
PMV BLD AUTO: 9.6 FL (ref 9–12.9)
POTASSIUM SERPL-SCNC: 3.8 MMOL/L (ref 3.6–5.5)
POTASSIUM SERPL-SCNC: 3.8 MMOL/L (ref 3.6–5.5)
RBC # BLD AUTO: 3.97 M/UL (ref 4.7–6.1)
RBC # BLD AUTO: 3.97 M/UL (ref 4.7–6.1)
SODIUM SERPL-SCNC: 139 MMOL/L (ref 135–145)
SODIUM SERPL-SCNC: 139 MMOL/L (ref 135–145)
WBC # BLD AUTO: 11.3 K/UL (ref 4.8–10.8)
WBC # BLD AUTO: 11.3 K/UL (ref 4.8–10.8)

## 2024-06-05 PROCEDURE — 700102 HCHG RX REV CODE 250 W/ 637 OVERRIDE(OP)

## 2024-06-05 PROCEDURE — 700111 HCHG RX REV CODE 636 W/ 250 OVERRIDE (IP): Mod: JZ | Performed by: PHYSICIAN ASSISTANT

## 2024-06-05 PROCEDURE — 36415 COLL VENOUS BLD VENIPUNCTURE: CPT

## 2024-06-05 PROCEDURE — 80048 BASIC METABOLIC PNL TOTAL CA: CPT

## 2024-06-05 PROCEDURE — 700102 HCHG RX REV CODE 250 W/ 637 OVERRIDE(OP): Performed by: NURSE PRACTITIONER

## 2024-06-05 PROCEDURE — 770001 HCHG ROOM/CARE - MED/SURG/GYN PRIV*

## 2024-06-05 PROCEDURE — A9270 NON-COVERED ITEM OR SERVICE: HCPCS | Performed by: NURSE PRACTITIONER

## 2024-06-05 PROCEDURE — 85025 COMPLETE CBC W/AUTO DIFF WBC: CPT

## 2024-06-05 PROCEDURE — A9270 NON-COVERED ITEM OR SERVICE: HCPCS

## 2024-06-05 PROCEDURE — 99232 SBSQ HOSP IP/OBS MODERATE 35: CPT

## 2024-06-05 RX ADMIN — ENOXAPARIN SODIUM 40 MG: 100 INJECTION SUBCUTANEOUS at 04:47

## 2024-06-05 RX ADMIN — DOCUSATE SODIUM 100 MG: 100 CAPSULE, LIQUID FILLED ORAL at 04:43

## 2024-06-05 RX ADMIN — METAXALONE 800 MG: 800 TABLET ORAL at 16:58

## 2024-06-05 RX ADMIN — CELECOXIB 100 MG: 100 CAPSULE ORAL at 16:58

## 2024-06-05 RX ADMIN — METAXALONE 800 MG: 800 TABLET ORAL at 04:42

## 2024-06-05 RX ADMIN — Medication 5 MG: at 02:16

## 2024-06-05 RX ADMIN — OXYCODONE HYDROCHLORIDE 15 MG: 15 TABLET ORAL at 21:08

## 2024-06-05 RX ADMIN — GABAPENTIN 300 MG: 300 CAPSULE ORAL at 04:43

## 2024-06-05 RX ADMIN — GABAPENTIN 300 MG: 300 CAPSULE ORAL at 21:08

## 2024-06-05 RX ADMIN — SENNOSIDES AND DOCUSATE SODIUM 1 TABLET: 50; 8.6 TABLET ORAL at 21:08

## 2024-06-05 RX ADMIN — CELECOXIB 100 MG: 100 CAPSULE ORAL at 04:42

## 2024-06-05 RX ADMIN — ENOXAPARIN SODIUM 40 MG: 100 INJECTION SUBCUTANEOUS at 18:00

## 2024-06-05 RX ADMIN — GABAPENTIN 300 MG: 300 CAPSULE ORAL at 15:32

## 2024-06-05 RX ADMIN — DULOXETINE HYDROCHLORIDE 30 MG: 30 CAPSULE, DELAYED RELEASE ORAL at 04:42

## 2024-06-05 ASSESSMENT — PAIN DESCRIPTION - PAIN TYPE
TYPE: SURGICAL PAIN
TYPE: ACUTE PAIN;SURGICAL PAIN
TYPE: ACUTE PAIN

## 2024-06-05 ASSESSMENT — ENCOUNTER SYMPTOMS
ROS GI COMMENTS: BM 6/4
MYALGIAS: 1
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
## 2024-06-05 NOTE — PROGRESS NOTES
"    Orthopedic PA Progress Note    Interval changes:  Patient doing well   LLE splint and dressings are CDI  NWB BLE  - hinged knee brace locked at 20 degrees to right knee, right foot in boot, left foot in splint  - PFWB LUE  Pending left foot surgery- [place]    ROS - Patient denies any new issues.  Denies any numbness or tingling. Pain well controlled.    /82   Pulse 88   Temp 36.3 °C (97.3 °F) (Temporal)   Resp 17   Ht 1.753 m (5' 9.02\")   Wt 77.1 kg (170 lb)   SpO2 97%     Patient seen and examined  No acute distress  Breathing non labored  RRR  LUE: Splint intact. Flexes and extends all fingers. Sensation intact. Cap refill <2s.   LLE: Splint and dressings CDI. Flexes and extends all toes. Sensation intact . Cap refill <2s.  RLE: knee brace and boot in place. Patient clearly fires tibialis anterior, EHL, and gastrocnemius/soleus. Sensation is intact to light touch throughout superficial peroneal, deep peroneal, tibial, saphenous, and sural nerve distributions. Strong and palpable 2+ dorsalis pedis and posterior tibial pulses with capillary refill less than 2 seconds.  Recent Labs     06/03/24  0834 06/05/24  0625   WBC 10.3 11.3*   RBC 3.84* 3.97*   HEMOGLOBIN 11.1* 11.4*   HEMATOCRIT 32.3* 33.6*   MCV 84.1 84.6   MCH 28.9 28.7   MCHC 34.4 33.9   RDW 39.7 42.0   PLATELETCT 441 531*   MPV 9.7 9.6       Active Hospital Problems    Diagnosis     Discharge planning issues [Z75.8]      Priority: High    Closed fracture of right tibial plateau [S82.141A]      Priority: High    Multiple fractures of left foot, closed, initial encounter [S92.902A]      Priority: High    Fracture of rib of left side [S22.32XA]      Priority: Medium    Closed left ankle fracture [S82.892A]      Priority: Medium    Sacral fracture, closed (HCC) [S32.10XA]      Priority: Medium    Multiple lacerations [T07.XXXA]      Priority: Medium    Multiple closed fractures of metacarpal bones " [S62.309A]      Priority: Medium    Closed fracture of right scapula [S42.101A]      Priority: Medium    Closed fracture of right ankle [S82.891A]      Priority: Medium    Evaluation by psychiatric service required [Z00.8]      Priority: Low    Trauma [T14.90XA]      Priority: Low    No contraindication to deep vein thrombosis (DVT) prophylaxis [Z78.9]      Priority: Low    Compression fracture of T8 vertebra (HCC) [S22.060A]      Priority: Low       Assessment/Plan:  Patient doing well   LLE splint and dressings are CDI  NWB BLE  - hinged knee brace locked at 20 degrees to right knee, right foot in boot, left foot in splint  - PFWB LUE  Pending left foot surgery- [place]    POD#6 S/p  Closed treatment with manipulation, left fourth and   fifth metacarpal fractures and application of short arm splint.  POD#12 S/p  1.  Open treatment and internal fixation of left bimalleolar ankle fracture.  2.  Open treatment and internal fixation of left distal tibiofibular joint   disruption.  3.  Excisional debridement of left leg laceration measuring 5x1.5 cm including   skin and subcutaneous tissue.  4.  Layered repair of intermediate complexity laceration measuring 5 cm, left   leg  Wt bearing status - NWB BLE  - hinged knee brace locked at 20 degrees to right knee, right foot in boot, left foot in splint  PFWB LUE  Wound care/Drains - Dressings to be left in place  Future Procedures - 6/6  Lovenox: Start 5/24  Sutures/Staples out- 14-21 days post operatively. Removal will completed by ortho GENIA's unless transferred.  DVT Prophylaxis outpatient: ASA 81 mg PO BID x4 weeks  PT/OT-initiated  Antibiotics:  Perioperative completed  DVT Prophylaxis- TEDS/SCDs/Foot pumps  Campos-not needed per ortho  Case Coordination for Discharge Planning - Disposition per therapy recs.

## 2024-06-05 NOTE — CARE PLAN
Problem: Skin Integrity  Goal: Skin integrity is maintained or improved  Description: Target End Date:  Prior to discharge or change in level of care    Document interventions on Skin Risk/Bienvenido flowsheet groups and corresponding LDA    1.  Assess and monitor skin integrity, appearance and/or temperature  2.  Assess risk factors for impaired skin integrity and/or pressures ulcers  3.  Implement precautions to protect skin integrity in collaboration with interdisciplinary team  4.  Implement pressure ulcer prevention protocol if at risk for skin breakdown  5.  Confirm wound care consult if at risk for skin breakdown  6.  Ensure patient use of pressure relieving devices  (Low air loss bed, waffle overlay, heel protectors, ROHO cushion, etc)  Outcome: Progressing     Problem: Self Care  Goal: Patient will have the ability to perform ADLs independently or with assistance (bathe, groom, dress, toilet and feed)  Description: Target End Date:  Prior to discharge or change in level of care    Document on ADL flowsheet    1.  Assess the capability and level of deficiency to perform ADLs  2.  Encourage family/care giver involvement  3.  Provide assistive devices  4.  Consider PT/OT evaluations  5.  Maintain support, give positive feedback, encourage self-care allowing extra time and verbal cuing as needed  6.  Avoid doing something for patients they can do themselves, but provide assistance as needed  7.  Assist in anticipating/planning individual needs  8.  Collaborate with Case Management and  to meet discharge needs  Outcome: Met     Problem: Wound/ / Incision Healing  Goal: Patient's wound/surgical incision will decrease in size and heals properly  Description: Target End Date:  Prior to discharge or change in level of care    Document on LDA    1.  Assess and document surgical incision/wound  2.  Provide incision/wound care per policy and/or provider orders  3.  Manage surgical drains per policy if  applicable  4.  Encourage adequate nutrition to promote wound healing  5.  Collaborate with Clinical Dietician  Outcome: Progressing   The patient is Stable - Low risk of patient condition declining or worsening    Shift Goals  Clinical Goals: Pain control, safe mobility  Patient Goals: Pain control, rest, comfort  Family Goals: N/A    Progress made toward(s) clinical / shift goals:   Pt. Vital signs WDL.   Pt. Has no new skin integrity issue/ impairment.   Pt. Performed   IADLs. Encouraged to shave his beard.

## 2024-06-05 NOTE — CARE PLAN
The patient is Stable - Low risk of patient condition declining or worsening    Shift Goals  Clinical Goals: Safety  Patient Goals: Pain  Family Goals: not present    Progress made toward(s) clinical / shift goals:    Problem: Skin Integrity  Goal: Skin integrity is maintained or improved  Outcome: Progressing     Problem: Discharge Barriers/Planning  Goal: Patient's continuum of care needs are met  Outcome: Progressing  Flowsheets (Taken 6/5/2024 0914)  Continuum of Care Needs:   Assessed for discharge barriers   Communicated discharge barriers to interdisciplinary tream   Involved patient/family/support system in discharge process   Collaborated with Case Management/     Problem: Bowel Elimination  Goal: Establish and maintain regular bowel function  Outcome: Progressing  Flowsheets (Taken 6/5/2024 0909)  Last BM: 06/04/24     Problem: Wound/ / Incision Healing  Goal: Patient's wound/surgical incision will decrease in size and heals properly  Outcome: Progressing  Note: Dressings clean, dry and intact       Patient is not progressing towards the following goals:

## 2024-06-05 NOTE — THERAPY
"Occupational Therapy  Daily Treatment     Patient Name: Hayden Manzanares II  Age:  34 y.o., Sex:  male  Medical Record #: [id number]  Today's Date: 6/4/2024     Precautions: Fall Risk, Non Weight Bearing Right Lower Extremity, Non Weight Bearing Left Lower Extremity, CAM Boot, Platform Weight Bearing Left Upper Extremity  Comments: HInged knee brace RLE; B CAM boots    Assessment    Pt seen for OT tx. See grid below for details including \"Other Treatments\" including specifics of home bathroom. Received in bed, agreeable to commode transfer. Pt able to transfer to drop-arm commode and  without assist via scooting, however pt pushes through L hand (despite constant cues not to). Pt had BM and completing hygiene with min A. Pt is nearing functional plateau from OT standpoint within current WB limitations. Plans to DC to [place] at  level with support from family. Provided all necesary DME is in place, pt can likely DC directly to [place] via father's minivan. Will likely need long slide-board for vehicle transfers. Will continue to follow for acute OT.     Plan    Treatment Plan Status: Continue Current Treatment Plan  Type of Treatment: Self Care / Activities of Daily Living, Adaptive Equipment, Therapeutic Exercises, Therapeutic Activity  Treatment Frequency: 3 Times per Week  Treatment Duration: Until Therapy Goals Met    DC Equipment Recommendations: Bed Side Commode, Wheelchair (drop-arm commode)  Discharge Recommendations: Other - (see above)    Subjective    \"My dad is coming on the 11th.\"     Objective       06/04/24 1635   Treatment Charges   Charges Yes   OT Self Care / ADL (Units) 1   OT Therapy Activity (Units) 1   Total Time Spent   OT Time Spent Yes   OT Self Care / ADL (Minutes) 25   OT Therapy Activity (Minutes) 10   OT Total Time Spent (Calculated) 35    Services   Is patient using  services for this encounter? No   Precautions   Precautions Fall Risk;Non Weight Bearing Right Lower " Extremity;Non Weight Bearing Left Lower Extremity;CAM Boot;Platform Weight Bearing Left Upper Extremity   Comments HInged knee brace RLE; B CAM boots   Vitals   O2 (LPM) 0   O2 Delivery Device None - Room Air   Pain   Pain Scales 0 to 10 Scale    Pain 0 - 10 Group   Location Shoulder   Location Orientation Posterior   Therapist Pain Assessment Post Activity Pain Same as Prior to Activity;Nurse Notified  (c/o pain with functional use of RUE; not rated, agreeable)   Non Verbal Descriptors   Non Verbal Scale  Calm;Unlabored Breathing   Cognition    Cognition / Consciousness WDL   Level of Consciousness Alert   Comments pleasant, cooperative, somewhat flat affect   Active ROM Upper Body   Active ROM Upper Body  X   Dominant Hand Right   Comments LUE in short arm splint with thumb only exposed; RUE NT   Other Treatments   Other Treatments Provided Assisted with adjustment of R hinged knee brace and R CAM boot. Discussed DC dispo in [place]. Pt provided photos of bathroom. Based on photos, toilet and tub/shower appear inaccessible by WC. Recommend use of BSC and alternate location for bathing or thorough sponge baths. Sister's home has 2 step entry.   Balance   Sitting Balance (Static) Good   Sitting Balance (Dynamic) Fair +   Weight Shift Sitting Fair   Skilled Intervention Compensatory Strategies;Postural Facilitation;Verbal Cuing   Bed Mobility    Supine to Sit Supervised   Sit to Supine Supervised   Scooting Supervised   Skilled Intervention Compensatory Strategies;Postural Facilitation;Verbal Cuing   Comments HOB slightly elevated but does not appear dependent on   Activities of Daily Living   Grooming   (declined)   Lower Body Dressing   (pt wearing hospital pants; declined changing due to pants fresh yesterday)   Toileting Minimal Assist  (BM on BSC)   Skilled Intervention Compensatory Strategies;Postural Facilitation;Verbal Cuing   How much help from another person does the patient currently need...   Putting on and  taking off regular lower body clothing? 2   Bathing (including washing, rinsing, and drying)? 2   Toileting, which includes using a toilet, bedpan, or urinal? 3   Putting on and taking off regular upper body clothing? 3   Taking care of personal grooming such as brushing teeth? 4   Eating meals? 4   6 Clicks Daily Activity Score 18   Functional Mobility   Sit to Stand Unable to Participate   Bed, Chair, Wheelchair Transfer Standby Assist   Toilet Transfers Standby Assist  (drop-arm commode)   Transfer Method Sit Pivot   Mobility supine > EOB, transfer to commode then WC on R   Skilled Intervention Compensatory Strategies;Postural Facilitation;Tactile Cuing;Verbal Cuing   Comments pt pushes through L hand during functional mobility despite constant cues not to   Visual Perception   Visual Perception  Not Tested   Activity Tolerance   Sitting in Chair >20 min; up post   Sitting Edge of Bed 3 min   Patient / Family Goals   Patient / Family Goal #1 To go home   Short Term Goals   Short Term Goal # 1 Pt will complete ADL txfs with supv   Goal Outcome # 1 Progressing as expected   Short Term Goal # 2 Pt will complete LB dressing with supv using AE PRN   Goal Outcome # 2   (NT this session)   Short Term Goal # 3 Pt will complete toileting ADLs with CGA   Goal Outcome # 3 Progressing as expected   Education Group   Education Provided Transfers;Weight Bearing Precautions   Transfers Patient Response Patient;Acceptance;Explanation;Verbal Demonstration;Action Demonstration;Reinforcement Needed  (commode and WC)   Weight Bearing Precautions Patient Response Patient;Acceptance;Explanation;Demonstration;Reinforcement Needed;Verbal Demonstration  (platform WB LUE)   Occupational Therapy Treatment Plan    O.T. Treatment Plan Continue Current Treatment Plan   Anticipated Discharge Equipment and Recommendations   DC Equipment Recommendations Bed Side Commode;Wheelchair  (drop-arm commode)   Discharge Recommendations Other -  (see  above)   Interdisciplinary Plan of Care Collaboration   IDT Collaboration with  Nursing   Patient Position at End of Therapy Seated;Call Light within Reach;Tray Table within Reach;Phone within Reach   Collaboration Comments OT results and recs   Session Information   Date / Session Number  6/4 #3 (2/3, 6/9)

## 2024-06-06 ENCOUNTER — APPOINTMENT (OUTPATIENT)
Dept: RADIOLOGY | Facility: MEDICAL CENTER | Age: 34
DRG: 464 | End: 2024-06-06
Attending: ORTHOPAEDIC SURGERY
Payer: OTHER MISCELLANEOUS

## 2024-06-06 ENCOUNTER — ANESTHESIA (OUTPATIENT)
Dept: SURGERY | Facility: MEDICAL CENTER | Age: 34
DRG: 464 | End: 2024-06-06
Payer: OTHER MISCELLANEOUS

## 2024-06-06 ENCOUNTER — APPOINTMENT (OUTPATIENT)
Dept: RADIOLOGY | Facility: MEDICAL CENTER | Age: 34
DRG: 464 | End: 2024-06-06
Attending: SURGERY
Payer: OTHER MISCELLANEOUS

## 2024-06-06 PROCEDURE — 64445 NJX AA&/STRD SCIATIC NRV IMG: CPT | Performed by: ORTHOPAEDIC SURGERY

## 2024-06-06 PROCEDURE — 700111 HCHG RX REV CODE 636 W/ 250 OVERRIDE (IP): Mod: JZ | Performed by: ANESTHESIOLOGY

## 2024-06-06 PROCEDURE — 700111 HCHG RX REV CODE 636 W/ 250 OVERRIDE (IP): Performed by: ORTHOPAEDIC SURGERY

## 2024-06-06 PROCEDURE — 700101 HCHG RX REV CODE 250: Performed by: ANESTHESIOLOGY

## 2024-06-06 PROCEDURE — 700102 HCHG RX REV CODE 250 W/ 637 OVERRIDE(OP)

## 2024-06-06 PROCEDURE — 700111 HCHG RX REV CODE 636 W/ 250 OVERRIDE (IP): Mod: JZ | Performed by: PHYSICIAN ASSISTANT

## 2024-06-06 PROCEDURE — 73630 X-RAY EXAM OF FOOT: CPT | Mod: LT

## 2024-06-06 PROCEDURE — 160002 HCHG RECOVERY MINUTES (STAT): Performed by: ORTHOPAEDIC SURGERY

## 2024-06-06 PROCEDURE — 110371 HCHG SHELL REV 272: Performed by: ORTHOPAEDIC SURGERY

## 2024-06-06 PROCEDURE — 160029 HCHG SURGERY MINUTES - 1ST 30 MINS LEVEL 4: Performed by: ORTHOPAEDIC SURGERY

## 2024-06-06 PROCEDURE — 0QSPXZZ REPOSITION LEFT METATARSAL, EXTERNAL APPROACH: ICD-10-PCS | Performed by: ORTHOPAEDIC SURGERY

## 2024-06-06 PROCEDURE — 700101 HCHG RX REV CODE 250: Performed by: ORTHOPAEDIC SURGERY

## 2024-06-06 PROCEDURE — 700102 HCHG RX REV CODE 250 W/ 637 OVERRIDE(OP): Performed by: ANESTHESIOLOGY

## 2024-06-06 PROCEDURE — A9270 NON-COVERED ITEM OR SERVICE: HCPCS

## 2024-06-06 PROCEDURE — 160048 HCHG OR STATISTICAL LEVEL 1-5: Performed by: ORTHOPAEDIC SURGERY

## 2024-06-06 PROCEDURE — 700111 HCHG RX REV CODE 636 W/ 250 OVERRIDE (IP): Performed by: ANESTHESIOLOGY

## 2024-06-06 PROCEDURE — 99232 SBSQ HOSP IP/OBS MODERATE 35: CPT

## 2024-06-06 PROCEDURE — 160041 HCHG SURGERY MINUTES - EA ADDL 1 MIN LEVEL 4: Performed by: ORTHOPAEDIC SURGERY

## 2024-06-06 PROCEDURE — 160036 HCHG PACU - EA ADDL 30 MINS PHASE I: Performed by: ORTHOPAEDIC SURGERY

## 2024-06-06 PROCEDURE — C1713 ANCHOR/SCREW BN/BN,TIS/BN: HCPCS | Performed by: ORTHOPAEDIC SURGERY

## 2024-06-06 PROCEDURE — 160009 HCHG ANES TIME/MIN: Performed by: ORTHOPAEDIC SURGERY

## 2024-06-06 PROCEDURE — A9270 NON-COVERED ITEM OR SERVICE: HCPCS | Performed by: NURSE PRACTITIONER

## 2024-06-06 PROCEDURE — 3E0T3BZ INTRODUCTION OF ANESTHETIC AGENT INTO PERIPHERAL NERVES AND PLEXI, PERCUTANEOUS APPROACH: ICD-10-PCS | Performed by: ANESTHESIOLOGY

## 2024-06-06 PROCEDURE — 770001 HCHG ROOM/CARE - MED/SURG/GYN PRIV*

## 2024-06-06 PROCEDURE — 0QSP04Z REPOSITION LEFT METATARSAL WITH INTERNAL FIXATION DEVICE, OPEN APPROACH: ICD-10-PCS | Performed by: ORTHOPAEDIC SURGERY

## 2024-06-06 PROCEDURE — 700102 HCHG RX REV CODE 250 W/ 637 OVERRIDE(OP): Performed by: NURSE PRACTITIONER

## 2024-06-06 PROCEDURE — 0SSL04Z REPOSITION LEFT TARSOMETATARSAL JOINT WITH INTERNAL FIXATION DEVICE, OPEN APPROACH: ICD-10-PCS | Performed by: ORTHOPAEDIC SURGERY

## 2024-06-06 PROCEDURE — 160035 HCHG PACU - 1ST 60 MINS PHASE I: Performed by: ORTHOPAEDIC SURGERY

## 2024-06-06 PROCEDURE — 700105 HCHG RX REV CODE 258: Performed by: ORTHOPAEDIC SURGERY

## 2024-06-06 PROCEDURE — 700105 HCHG RX REV CODE 258: Performed by: ANESTHESIOLOGY

## 2024-06-06 PROCEDURE — A9270 NON-COVERED ITEM OR SERVICE: HCPCS | Performed by: ANESTHESIOLOGY

## 2024-06-06 DEVICE — SCREW BONE T10 FULL THREAD L34 MM OD3.5 MM STARDRIVE NONSTERILE VARIAX FOOT PLATE SYSTEM: Type: IMPLANTABLE DEVICE | Status: FUNCTIONAL

## 2024-06-06 DEVICE — WIRE FIXATION KIRSCHNER L150 MM OD1.6 MM: Type: IMPLANTABLE DEVICE | Status: FUNCTIONAL

## 2024-06-06 DEVICE — IMPLANTABLE DEVICE: Type: IMPLANTABLE DEVICE | Status: FUNCTIONAL

## 2024-06-06 DEVICE — SCREW BONE VARIAX T10 FULL THREAD L22 MM OD3.5 MM FOOT ANKLE STARDRIVE NONSTERILE: Type: IMPLANTABLE DEVICE | Status: FUNCTIONAL

## 2024-06-06 DEVICE — SCREW BONE VARIAX T10 FULL THREAD L26 MM OD3.5 MM FOOT ANKLE LOCK STARDRIVE NONSTERILE: Type: IMPLANTABLE DEVICE | Status: FUNCTIONAL

## 2024-06-06 DEVICE — SCREW BONE T10 FULL THREAD L28 MM OD3.5 MM LOCK STARDRIVE NONSTERILE VARIAX FOOT PLATE SYSTEM: Type: IMPLANTABLE DEVICE | Status: FUNCTIONAL

## 2024-06-06 RX ORDER — HYDROMORPHONE HYDROCHLORIDE 1 MG/ML
0.2 INJECTION, SOLUTION INTRAMUSCULAR; INTRAVENOUS; SUBCUTANEOUS
Status: DISCONTINUED | OUTPATIENT
Start: 2024-06-06 | End: 2024-06-06 | Stop reason: HOSPADM

## 2024-06-06 RX ORDER — HYDRALAZINE HYDROCHLORIDE 20 MG/ML
5 INJECTION INTRAMUSCULAR; INTRAVENOUS
Status: DISCONTINUED | OUTPATIENT
Start: 2024-06-06 | End: 2024-06-06 | Stop reason: HOSPADM

## 2024-06-06 RX ORDER — DIPHENHYDRAMINE HYDROCHLORIDE 50 MG/ML
12.5 INJECTION INTRAMUSCULAR; INTRAVENOUS
Status: DISCONTINUED | OUTPATIENT
Start: 2024-06-06 | End: 2024-06-06 | Stop reason: HOSPADM

## 2024-06-06 RX ORDER — HALOPERIDOL 5 MG/ML
1 INJECTION INTRAMUSCULAR
Status: DISCONTINUED | OUTPATIENT
Start: 2024-06-06 | End: 2024-06-06 | Stop reason: HOSPADM

## 2024-06-06 RX ORDER — KETOROLAC TROMETHAMINE 15 MG/ML
INJECTION, SOLUTION INTRAMUSCULAR; INTRAVENOUS PRN
Status: DISCONTINUED | OUTPATIENT
Start: 2024-06-06 | End: 2024-06-06 | Stop reason: SURG

## 2024-06-06 RX ORDER — MIDAZOLAM HYDROCHLORIDE 1 MG/ML
INJECTION INTRAMUSCULAR; INTRAVENOUS PRN
Status: DISCONTINUED | OUTPATIENT
Start: 2024-06-06 | End: 2024-06-06 | Stop reason: SURG

## 2024-06-06 RX ORDER — SODIUM CHLORIDE, SODIUM LACTATE, POTASSIUM CHLORIDE, CALCIUM CHLORIDE 600; 310; 30; 20 MG/100ML; MG/100ML; MG/100ML; MG/100ML
INJECTION, SOLUTION INTRAVENOUS
Status: DISCONTINUED | OUTPATIENT
Start: 2024-06-06 | End: 2024-06-06 | Stop reason: SURG

## 2024-06-06 RX ORDER — EPHEDRINE SULFATE 50 MG/ML
5 INJECTION, SOLUTION INTRAVENOUS
Status: DISCONTINUED | OUTPATIENT
Start: 2024-06-06 | End: 2024-06-06 | Stop reason: HOSPADM

## 2024-06-06 RX ORDER — MEPERIDINE HYDROCHLORIDE 25 MG/ML
12.5 INJECTION INTRAMUSCULAR; INTRAVENOUS; SUBCUTANEOUS
Status: DISCONTINUED | OUTPATIENT
Start: 2024-06-06 | End: 2024-06-06 | Stop reason: HOSPADM

## 2024-06-06 RX ORDER — HYDROMORPHONE HYDROCHLORIDE 1 MG/ML
0.5 INJECTION, SOLUTION INTRAMUSCULAR; INTRAVENOUS; SUBCUTANEOUS
Status: DISCONTINUED | OUTPATIENT
Start: 2024-06-06 | End: 2024-06-06 | Stop reason: HOSPADM

## 2024-06-06 RX ORDER — ALBUTEROL SULFATE 5 MG/ML
2.5 SOLUTION RESPIRATORY (INHALATION)
Status: DISCONTINUED | OUTPATIENT
Start: 2024-06-06 | End: 2024-06-06 | Stop reason: HOSPADM

## 2024-06-06 RX ORDER — DEXAMETHASONE SODIUM PHOSPHATE 4 MG/ML
INJECTION, SOLUTION INTRA-ARTICULAR; INTRALESIONAL; INTRAMUSCULAR; INTRAVENOUS; SOFT TISSUE PRN
Status: DISCONTINUED | OUTPATIENT
Start: 2024-06-06 | End: 2024-06-06 | Stop reason: SURG

## 2024-06-06 RX ORDER — OXYCODONE HCL 5 MG/5 ML
5 SOLUTION, ORAL ORAL
Status: COMPLETED | OUTPATIENT
Start: 2024-06-06 | End: 2024-06-06

## 2024-06-06 RX ORDER — MAGNESIUM HYDROXIDE 1200 MG/15ML
LIQUID ORAL
Status: COMPLETED | OUTPATIENT
Start: 2024-06-06 | End: 2024-06-06

## 2024-06-06 RX ORDER — HYDROMORPHONE HYDROCHLORIDE 1 MG/ML
0.4 INJECTION, SOLUTION INTRAMUSCULAR; INTRAVENOUS; SUBCUTANEOUS
Status: DISCONTINUED | OUTPATIENT
Start: 2024-06-06 | End: 2024-06-06 | Stop reason: HOSPADM

## 2024-06-06 RX ORDER — ONDANSETRON 2 MG/ML
INJECTION INTRAMUSCULAR; INTRAVENOUS PRN
Status: DISCONTINUED | OUTPATIENT
Start: 2024-06-06 | End: 2024-06-06 | Stop reason: SURG

## 2024-06-06 RX ORDER — SODIUM CHLORIDE, SODIUM LACTATE, POTASSIUM CHLORIDE, CALCIUM CHLORIDE 600; 310; 30; 20 MG/100ML; MG/100ML; MG/100ML; MG/100ML
INJECTION, SOLUTION INTRAVENOUS CONTINUOUS
Status: DISCONTINUED | OUTPATIENT
Start: 2024-06-06 | End: 2024-06-06 | Stop reason: HOSPADM

## 2024-06-06 RX ORDER — CEFAZOLIN SODIUM 1 G/3ML
INJECTION, POWDER, FOR SOLUTION INTRAMUSCULAR; INTRAVENOUS PRN
Status: DISCONTINUED | OUTPATIENT
Start: 2024-06-06 | End: 2024-06-06 | Stop reason: SURG

## 2024-06-06 RX ORDER — BUPIVACAINE HYDROCHLORIDE AND EPINEPHRINE 2.5; 5 MG/ML; UG/ML
INJECTION, SOLUTION EPIDURAL; INFILTRATION; INTRACAUDAL; PERINEURAL
Status: COMPLETED | OUTPATIENT
Start: 2024-06-06 | End: 2024-06-06

## 2024-06-06 RX ORDER — LIDOCAINE HYDROCHLORIDE 20 MG/ML
INJECTION, SOLUTION EPIDURAL; INFILTRATION; INTRACAUDAL; PERINEURAL PRN
Status: DISCONTINUED | OUTPATIENT
Start: 2024-06-06 | End: 2024-06-06 | Stop reason: SURG

## 2024-06-06 RX ORDER — OXYCODONE HCL 5 MG/5 ML
10 SOLUTION, ORAL ORAL
Status: COMPLETED | OUTPATIENT
Start: 2024-06-06 | End: 2024-06-06

## 2024-06-06 RX ADMIN — OXYCODONE HYDROCHLORIDE 10 MG: 5 SOLUTION ORAL at 16:22

## 2024-06-06 RX ADMIN — PROPOFOL 150 MG: 10 INJECTION, EMULSION INTRAVENOUS at 14:41

## 2024-06-06 RX ADMIN — DOCUSATE SODIUM 100 MG: 100 CAPSULE, LIQUID FILLED ORAL at 04:37

## 2024-06-06 RX ADMIN — ENOXAPARIN SODIUM 40 MG: 100 INJECTION SUBCUTANEOUS at 18:22

## 2024-06-06 RX ADMIN — LIDOCAINE HYDROCHLORIDE 80 MG: 20 INJECTION, SOLUTION EPIDURAL; INFILTRATION; INTRACAUDAL at 14:41

## 2024-06-06 RX ADMIN — GABAPENTIN 300 MG: 300 CAPSULE ORAL at 04:36

## 2024-06-06 RX ADMIN — SODIUM CHLORIDE, POTASSIUM CHLORIDE, SODIUM LACTATE AND CALCIUM CHLORIDE: 600; 310; 30; 20 INJECTION, SOLUTION INTRAVENOUS at 14:30

## 2024-06-06 RX ADMIN — ENOXAPARIN SODIUM 40 MG: 100 INJECTION SUBCUTANEOUS at 04:37

## 2024-06-06 RX ADMIN — KETOROLAC TROMETHAMINE 15 MG: 15 INJECTION, SOLUTION INTRAMUSCULAR; INTRAVENOUS at 16:10

## 2024-06-06 RX ADMIN — SENNOSIDES AND DOCUSATE SODIUM 1 TABLET: 50; 8.6 TABLET ORAL at 21:18

## 2024-06-06 RX ADMIN — OXYCODONE HYDROCHLORIDE 10 MG: 10 TABLET ORAL at 18:22

## 2024-06-06 RX ADMIN — OXYCODONE HYDROCHLORIDE 15 MG: 15 TABLET ORAL at 04:39

## 2024-06-06 RX ADMIN — METAXALONE 800 MG: 800 TABLET ORAL at 18:22

## 2024-06-06 RX ADMIN — CELECOXIB 100 MG: 100 CAPSULE ORAL at 18:22

## 2024-06-06 RX ADMIN — CELECOXIB 100 MG: 100 CAPSULE ORAL at 04:37

## 2024-06-06 RX ADMIN — DOCUSATE SODIUM 100 MG: 100 CAPSULE, LIQUID FILLED ORAL at 18:22

## 2024-06-06 RX ADMIN — ONDANSETRON 4 MG: 2 INJECTION INTRAMUSCULAR; INTRAVENOUS at 16:10

## 2024-06-06 RX ADMIN — METAXALONE 800 MG: 800 TABLET ORAL at 04:37

## 2024-06-06 RX ADMIN — FENTANYL CITRATE 100 MCG: 50 INJECTION, SOLUTION INTRAMUSCULAR; INTRAVENOUS at 14:41

## 2024-06-06 RX ADMIN — BUPIVACAINE HYDROCHLORIDE AND EPINEPHRINE BITARTRATE 30 ML: 2.5; .0091 INJECTION, SOLUTION EPIDURAL; INFILTRATION; INTRACAUDAL; PERINEURAL at 14:44

## 2024-06-06 RX ADMIN — MEPERIDINE HYDROCHLORIDE 12.5 MG: 25 INJECTION INTRAMUSCULAR; INTRAVENOUS; SUBCUTANEOUS at 16:30

## 2024-06-06 RX ADMIN — MIDAZOLAM HYDROCHLORIDE 2 MG: 2 INJECTION, SOLUTION INTRAMUSCULAR; INTRAVENOUS at 14:36

## 2024-06-06 RX ADMIN — MEPERIDINE HYDROCHLORIDE 12.5 MG: 25 INJECTION INTRAMUSCULAR; INTRAVENOUS; SUBCUTANEOUS at 16:25

## 2024-06-06 RX ADMIN — GABAPENTIN 300 MG: 300 CAPSULE ORAL at 21:18

## 2024-06-06 RX ADMIN — CEFAZOLIN 2 G: 2 INJECTION, POWDER, FOR SOLUTION INTRAMUSCULAR; INTRAVENOUS at 23:00

## 2024-06-06 RX ADMIN — DULOXETINE HYDROCHLORIDE 30 MG: 30 CAPSULE, DELAYED RELEASE ORAL at 04:36

## 2024-06-06 RX ADMIN — CEFAZOLIN 2 G: 1 INJECTION, POWDER, FOR SOLUTION INTRAMUSCULAR; INTRAVENOUS at 14:43

## 2024-06-06 RX ADMIN — DEXAMETHASONE SODIUM PHOSPHATE 8 MG: 4 INJECTION INTRA-ARTICULAR; INTRALESIONAL; INTRAMUSCULAR; INTRAVENOUS; SOFT TISSUE at 14:43

## 2024-06-06 ASSESSMENT — ENCOUNTER SYMPTOMS
ROS GI COMMENTS: BM 6/6
MYALGIAS: 1

## 2024-06-06 ASSESSMENT — PAIN DESCRIPTION - PAIN TYPE
TYPE: ACUTE PAIN;SURGICAL PAIN
TYPE: ACUTE PAIN

## 2024-06-06 NOTE — ANESTHESIA PREPROCEDURE EVALUATION
Case: [id number] Date/Time: 06/06/24 1245    Procedure: ORIF, FOOT - LEFT    Location: [place] [address] / SURGERY [facility]    Surgeons: J Carlos Pollard M.D.            Relevant Problems   Other   (positive) Closed fracture of right ankle   (positive) Closed fracture of right scapula   (positive) Closed fracture of right tibial plateau   (positive) Closed left ankle fracture   (positive) Compression fracture of T8 vertebra (HCC)   (positive) Fracture of rib of left side   (positive) Multiple fractures of left foot, closed, initial encounter   (positive) Multiple lacerations   (positive) Sacral fracture, closed (HCC)   (positive) Trauma     Anes H&P:  PAST MEDICAL HISTORY:   34 y.o. male who presents for Procedure(s):  ORIF, FOOT - LEFT.  He has current and past medical problems significant for:    History reviewed. No pertinent past medical history.    SMOKING/ALCOHOL/RECREATIONAL DRUG USE:  Social History     Tobacco Use    Smoking status: Some Days     Current packs/day: 1.00     Average packs/day: 1 pack/day for 17.4 years (17.4 ttl pk-yrs)     Types: Cigarettes     Start date: 1/1/2007    Smokeless tobacco: Never   Vaping Use    Vaping status: Every Day    Substances: Nicotine    Devices: Disposable   Substance Use Topics    Alcohol use: Not Currently    Drug use: Yes     Types: Intravenous     Comment: Heroin     Social History     Substance and Sexual Activity   Drug Use Yes    Types: Intravenous    Comment: Heroin       PAST SURGICAL HISTORY:  Past Surgical History:   Procedure Laterality Date    PB CLOSED RX HUMER CONDYLR FX,MANIP Left 5/30/2024    Procedure: CLOSED REDUCTION;  Surgeon: J Carlos Pollard M.D.;  Location: SURGERY [facility];  Service: Orthopedics    ORIF, ANKLE Left 5/24/2024    Procedure: ORIF, ANKLE;  Surgeon: J Carlos Pollard M.D.;  [facility]: [facility];  Service: Orthopedics    LACERATION REPAIR Left 5/24/2024    Procedure: REPAIR, LACERATION;  Surgeon: J Carlos Pollard M.D.;   Location: SURGERY [facility];  Service: Orthopedics    OTHER         ALLERGIES:   Not on File    MEDICATIONS:  No current facility-administered medications on file prior to encounter.     Current Outpatient Medications on File Prior to Encounter   Medication Sig Dispense Refill    ibuprofen (MOTRIN) 200 MG Tab Take 800-1,000 mg by mouth 1 time a day as needed for Moderate Pain. 4 to 5 tablets = 800 to 1,000 mg.         LABS:  Lab Results   Component Value Date/Time    HEMOGLOBIN 11.4 (L) 06/05/2024 0625    HEMATOCRIT 33.6 (L) 06/05/2024 0625    WBC 11.3 (H) 06/05/2024 0625     Lab Results   Component Value Date/Time    SODIUM 139 06/05/2024 0625    POTASSIUM 3.8 06/05/2024 0625    CHLORIDE 105 06/05/2024 0625    CO2 20 06/05/2024 0625    GLUCOSE 100 (H) 06/05/2024 0625    BUN 20 06/05/2024 0625    CALCIUM 9.0 06/05/2024 0625         PREVIOUS ANESTHETICS: See EMR  __________________________________________      Physical Exam    Airway   Mallampati: II  TM distance: >3 FB  Neck ROM: full       Cardiovascular - normal exam  Rhythm: regular  Rate: normal  (-) murmur     Dental - normal exam      Very poor dentition  Facial Hair   Pulmonary - normal exam  Breath sounds clear to auscultation     Abdominal    Neurological - normal exam                   Anesthesia Plan    ASA 2       Plan - general and peripheral nerve block     Peripheral nerve block will be post-op pain control  Airway plan will be LMA          Induction: intravenous    Postoperative Plan: Postoperative administration of opioids is intended.    Pertinent diagnostic labs and testing reviewed    Informed Consent:    Anesthetic plan and risks discussed with patient.    Use of blood products discussed with: patient whom consented to blood products.

## 2024-06-06 NOTE — DISCHARGE PLANNING
Case Management Discharge Planning    Admission Date: 5/23/2024  GMLOS: 5.6  ALOS: 14    6-Clicks ADL Score: 18  6-Clicks Mobility Score: 12  PT and/or OT Eval ordered: Yes  Post-acute Referrals Ordered: Yes  Post-acute Choice Obtained: Yes  Has referral(s) been sent to post-acute provider:  Yes      Anticipated Discharge Dispo: Discharge Disposition: D/T to SNF with Medicare cert in anticipation of skilled care (03)    DME Needed: No    Action(s) Taken: Pt was discussed during IDT rounds, pt is NPO for surgery on right ankle today. LMSW previously met with pt at bedside and stated he was agreeable to post-acute placement. Per DNP notes, pt is unsure if he wants to go SNF or if he wants to go home to [place] with his parents who will be arriving to [place] on 6/11.     Escalations Completed: None    Medically Clear: No    Next Steps: LMSW to meet with pt at bedside following surgery to discuss DCP.    Barriers to Discharge: Medical clearance    Is the patient up for discharge tomorrow: No

## 2024-06-06 NOTE — CARE PLAN
The patient is Stable - Low risk of patient condition declining or worsening    Shift Goals  Clinical Goals: safety, pain ontrol  Patient Goals: rest, comfort  Family Goals: no family present    Progress made toward(s) clinical / shift goals:    Problem: Skin Integrity  Goal: Skin integrity is maintained or improved  Outcome: Progressing    Educate the patient regarding her plan of care and the importance of turning and mobilization.        Patient is not progressing towards the following goals:

## 2024-06-06 NOTE — ANESTHESIA PROCEDURE NOTES
Airway    Date/Time: 6/6/2024 2:41 PM    Performed by: Farooq Ornelas M.D.  Authorized by: Farooq Ornelas M.D.    Location:  OR  Urgency:  Elective  Difficult Airway: No    Indications for Airway Management:  Anesthesia      Spontaneous Ventilation: absent    Sedation Level:  Deep  Preoxygenated: Yes    Mask Difficulty Assessment:  0 - not attempted  Final Airway Type:  Supraglottic airway  Final Supraglottic Airway:  Standard LMA    SGA Size:  4  Number of Attempts at Approach:  1

## 2024-06-06 NOTE — THERAPY
Occupational Therapy Contact Note    Patient Name: Hayden Manzanares II  Age:  34 y.o., Sex:  male  Medical Record #: [id number]  Today's Date: 6/6/2024 06/06/24 1427   Interdisciplinary Plan of Care Collaboration   Collaboration Comments OT tx attempted. Pt in OR for surgical repair of L MT fxs. Will resume OT post-op.

## 2024-06-06 NOTE — OR SURGEON
Immediate Post OP Note    PreOp Diagnosis: Left comminuted, displaced 1st metatarsal fracture and 1st TMT dislocation    PostOp Diagnosis: same      Procedure(s):  left foot 1st metatarsal ORIF - Wound Class: Clean Contaminated    Surgeon(s):  J Carlos Pollard M.D.    Anesthesiologist/Type of Anesthesia:  Anesthesiologist: Farooq Ornelas M.D./General    Surgical Staff:  Circulator: Matilde Torres RWONG; Lukas D. Gansert, RWONG  Scrub Person: Anthony Castillo [place]: Osbaldo Glover R.N.  Radiology Technologist: Pavel Gaines    Specimens removed if any:  * No specimens in log *    Estimated Blood Loss: minimal    Findings: see dictation    Complications: none known    PLAN:  --readmit postop  --NWB BLE  --PT/OT for wheelchair transfers  --ancef x 48hrs postop  --okay to continue lovenox   --fu 2 weeks postop        6/6/2024 4:24 PM J Carlos Pollard M.D.

## 2024-06-06 NOTE — CARE PLAN
Problem: Skin Integrity  Goal: Skin integrity is maintained or improved  Outcome: Progressing     Problem: Communication  Goal: The ability to communicate needs accurately and effectively will improve  Outcome: Progressing     Problem: Discharge Barriers/Planning  Goal: Patient's continuum of care needs are met  Outcome: Progressing     Problem: Bowel Elimination  Goal: Establish and maintain regular bowel function  Outcome: Progressing     Problem: Wound/ / Incision Healing  Goal: Patient's wound/surgical incision will decrease in size and heals properly  Outcome: Progressing   The patient is Stable - Low risk of patient condition declining or worsening    Shift Goals  Clinical Goals: Prepare for surgery  Patient Goals: Prepare for surgery  Family Goals: Not present    Progress made toward(s) clinical / shift goals:      Patient is not progressing towards the following goals:

## 2024-06-06 NOTE — ANESTHESIA PROCEDURE NOTES
Peripheral Block    Date/Time: 6/6/2024 2:44 PM    Performed by: Farooq Ornelas M.D.  Authorized by: Farooq Ornelas M.D.    Patient Location:  OR  Start Time:  6/6/2024 2:44 PM  Reason for Block: at surgeon's request and post-op pain management ONLY    patient identified, IV checked, site marked, risks and benefits discussed, surgical consent, monitors and equipment checked, pre-op evaluation and timeout performed    Patient Position:  Supine  Prep: ChloraPrep    Monitoring:  Heart rate, continuous pulse ox and cardiac monitor  Block Region:  Lower Extremity  Lower Extremity - Block Type:  SCIATIC nerve block, lateral approach    Laterality:  Left  Procedures: ultrasound guided  Image captured, interpreted and electronically stored.  Block Type:  Single-shot  Needle Length:  100mm  Needle Gauge:  21 G  Needle Localization:  Ultrasound guidance  Ultrasound picture in chart  Injection Assessment:  Negative aspiration for heme, no paresthesia on injection, incremental injection and local visualized surrounding nerve on ultrasound  Evidence of intravascular injection: No     US Guided Sciatic Nerve Block   US probe placed several cm proximal to popliteal crease on posterior thigh and scanned caudad and cephalad until Sciatic Nerve (SN) identified superficial/lateral to popliteal artery.  Needle inserted lateral to probe in an in plane approach under direct visualization to a perineural position.  After negative aspiration LA injected with ease and visualized surrounding the SN.

## 2024-06-06 NOTE — ANESTHESIA TIME REPORT
Anesthesia Start and Stop Event Times       Date Time Event    6/6/2024 1416 Ready for Procedure     1435 Anesthesia Start     1627 Anesthesia Stop          Responsible Staff  06/06/24      Name Role Begin End    Farooq Ornelas M.D. Anesth 1435 1627          Overtime Reason:  no overtime (within assigned shift)    Comments:

## 2024-06-06 NOTE — OR NURSING
Assumed care in pre- op . Patient is alert and oriented x 4 . With  dressing left  arm and long knee immobilizer on the right .   V/s taken and recorded.   IV at right unable to flush , cannula is kink and site is mildly red. IV removed and dressing applied.   Contacted vascular IV us RN.

## 2024-06-06 NOTE — THERAPY
Physical Therapy Contact Note    Patient Name: Hayden Manzanares II  Age:  34 y.o., Sex:  male  Medical Record #: [id number]  Today's Date: 6/6/2024    Pt going for surgery today for L foot fx. Will hold and follow post-op as able and appropriate.
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
## 2024-06-07 ENCOUNTER — APPOINTMENT (OUTPATIENT)
Dept: RADIOLOGY | Facility: MEDICAL CENTER | Age: 34
DRG: 464 | End: 2024-06-07
Attending: SURGERY
Payer: OTHER MISCELLANEOUS

## 2024-06-07 PROCEDURE — 700102 HCHG RX REV CODE 250 W/ 637 OVERRIDE(OP): Performed by: NURSE PRACTITIONER

## 2024-06-07 PROCEDURE — 700102 HCHG RX REV CODE 250 W/ 637 OVERRIDE(OP)

## 2024-06-07 PROCEDURE — 700105 HCHG RX REV CODE 258: Performed by: ORTHOPAEDIC SURGERY

## 2024-06-07 PROCEDURE — 700111 HCHG RX REV CODE 636 W/ 250 OVERRIDE (IP): Performed by: ORTHOPAEDIC SURGERY

## 2024-06-07 PROCEDURE — 770001 HCHG ROOM/CARE - MED/SURG/GYN PRIV*

## 2024-06-07 PROCEDURE — A9270 NON-COVERED ITEM OR SERVICE: HCPCS | Performed by: NURSE PRACTITIONER

## 2024-06-07 PROCEDURE — 700111 HCHG RX REV CODE 636 W/ 250 OVERRIDE (IP)

## 2024-06-07 PROCEDURE — 700111 HCHG RX REV CODE 636 W/ 250 OVERRIDE (IP): Mod: JZ | Performed by: PHYSICIAN ASSISTANT

## 2024-06-07 PROCEDURE — A9270 NON-COVERED ITEM OR SERVICE: HCPCS

## 2024-06-07 PROCEDURE — 99232 SBSQ HOSP IP/OBS MODERATE 35: CPT | Performed by: NURSE PRACTITIONER

## 2024-06-07 RX ORDER — KETOROLAC TROMETHAMINE 15 MG/ML
15 INJECTION, SOLUTION INTRAMUSCULAR; INTRAVENOUS EVERY 6 HOURS PRN
Status: DISCONTINUED | OUTPATIENT
Start: 2024-06-07 | End: 2024-06-11

## 2024-06-07 RX ADMIN — GABAPENTIN 300 MG: 300 CAPSULE ORAL at 21:59

## 2024-06-07 RX ADMIN — CEFAZOLIN 2 G: 2 INJECTION, POWDER, FOR SOLUTION INTRAMUSCULAR; INTRAVENOUS at 14:46

## 2024-06-07 RX ADMIN — OXYCODONE HYDROCHLORIDE 15 MG: 15 TABLET ORAL at 03:45

## 2024-06-07 RX ADMIN — METAXALONE 800 MG: 800 TABLET ORAL at 11:31

## 2024-06-07 RX ADMIN — SENNOSIDES AND DOCUSATE SODIUM 1 TABLET: 50; 8.6 TABLET ORAL at 22:02

## 2024-06-07 RX ADMIN — ENOXAPARIN SODIUM 40 MG: 100 INJECTION SUBCUTANEOUS at 17:12

## 2024-06-07 RX ADMIN — ENOXAPARIN SODIUM 40 MG: 100 INJECTION SUBCUTANEOUS at 05:15

## 2024-06-07 RX ADMIN — HYDROMORPHONE HYDROCHLORIDE 0.25 MG: 1 INJECTION, SOLUTION INTRAMUSCULAR; INTRAVENOUS; SUBCUTANEOUS at 12:19

## 2024-06-07 RX ADMIN — GABAPENTIN 300 MG: 300 CAPSULE ORAL at 14:39

## 2024-06-07 RX ADMIN — CEFAZOLIN 2 G: 2 INJECTION, POWDER, FOR SOLUTION INTRAMUSCULAR; INTRAVENOUS at 05:19

## 2024-06-07 RX ADMIN — CEFAZOLIN 2 G: 2 INJECTION, POWDER, FOR SOLUTION INTRAMUSCULAR; INTRAVENOUS at 22:04

## 2024-06-07 RX ADMIN — CELECOXIB 100 MG: 100 CAPSULE ORAL at 05:15

## 2024-06-07 RX ADMIN — METAXALONE 800 MG: 800 TABLET ORAL at 05:15

## 2024-06-07 RX ADMIN — DOCUSATE SODIUM 100 MG: 100 CAPSULE, LIQUID FILLED ORAL at 05:15

## 2024-06-07 RX ADMIN — OXYCODONE HYDROCHLORIDE 15 MG: 15 TABLET ORAL at 07:35

## 2024-06-07 RX ADMIN — DOCUSATE SODIUM 100 MG: 100 CAPSULE, LIQUID FILLED ORAL at 17:12

## 2024-06-07 RX ADMIN — OXYCODONE HYDROCHLORIDE 15 MG: 15 TABLET ORAL at 10:44

## 2024-06-07 RX ADMIN — GABAPENTIN 300 MG: 300 CAPSULE ORAL at 05:15

## 2024-06-07 RX ADMIN — OXYCODONE HYDROCHLORIDE 15 MG: 15 TABLET ORAL at 22:01

## 2024-06-07 RX ADMIN — KETOROLAC TROMETHAMINE 15 MG: 15 INJECTION, SOLUTION INTRAMUSCULAR; INTRAVENOUS at 14:39

## 2024-06-07 RX ADMIN — DULOXETINE HYDROCHLORIDE 30 MG: 30 CAPSULE, DELAYED RELEASE ORAL at 05:15

## 2024-06-07 RX ADMIN — HYDROMORPHONE HYDROCHLORIDE 0.25 MG: 1 INJECTION, SOLUTION INTRAMUSCULAR; INTRAVENOUS; SUBCUTANEOUS at 05:12

## 2024-06-07 RX ADMIN — METAXALONE 800 MG: 800 TABLET ORAL at 17:12

## 2024-06-07 ASSESSMENT — PAIN DESCRIPTION - PAIN TYPE
TYPE: ACUTE PAIN
TYPE: SURGICAL PAIN
TYPE: ACUTE PAIN

## 2024-06-07 ASSESSMENT — ENCOUNTER SYMPTOMS
ROS GI COMMENTS: BM 6/7
MYALGIAS: 1

## 2024-06-07 NOTE — PROGRESS NOTES
1236 Notified Mg Gallego of pt stating pain is 10/10 despite oxycodone and dilaudid administration. No change to orders. At this time pt was in bed watching a movie on his phone.      1246 Notified Augie Cooley per recommendation of Mg. Also that pt is refusing to work with PT.     1300 charge nurse JUAN F Guerin present to assess pt because he was getting agitated.

## 2024-06-07 NOTE — THERAPY
Occupational Therapy Contact Note    Patient Name: Hayden Manzanares II  Age:  34 y.o., Sex:  male  Medical Record #: [id number]  Today's Date: 6/7/2024    Discussed missed therapy with RNCHHAYA.       06/07/24 1220   Interdisciplinary Plan of Care Collaboration   Collaboration Comments OT tx attempted. Pt is POD #1 surgical repair L foot fxs. Despite IV Dilaudid pt reports 10/10 pain and states he is unable to participate in any OOB activity. Up to this point, pt has been very participatory in therapies and making good progress. Today he is limited by pain. Anticipate he will resume functional progress once pain is better controlled.  Will attempt tx again.

## 2024-06-07 NOTE — CARE PLAN
The patient is Stable - Low risk of patient condition declining or worsening    Shift Goals  Clinical Goals: safety  Patient Goals: rest  Family Goals: no family present    Progress made toward(s) clinical / shift goals:  pt able to communicate needs to staff and understand he needs to call before mobility for assistance.     Patient is not progressing towards the following goals:

## 2024-06-07 NOTE — PROGRESS NOTES
"Virtual Nurse rounding complete.    Round Needs: Pain Rating Patient reported \"15/10\" pain to his left foot, he is asking for dilaudid at this time and Notified of Patient Needs: Primary RN.    "
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
## 2024-06-07 NOTE — ANESTHESIA POSTPROCEDURE EVALUATION
Patient: Hayden Manzanares II    Procedure Summary       Date: 06/06/24 Room / Location: [place] [id number] / SURGERY [facility]    Anesthesia Start: 1435 Anesthesia Stop: 1627    Procedure: left foot 1st metatarsal ORIF (Left: Foot) Diagnosis: (left foot 1st metatarsal fracture)    Surgeons: J Carlos Pollard M.D. Responsible Provider: Farooq Ornelas M.D.    Anesthesia Type: general, peripheral nerve block ASA Status: 2            Final Anesthesia Type: general, peripheral nerve block  Last vitals  BP   Blood Pressure: 122/81    Temp   36.7 °C (98 °F)    Pulse   84   Resp   20    SpO2   96 %      Anesthesia Post Evaluation    Patient location during evaluation: PACU  Patient participation: complete - patient participated  Level of consciousness: sleepy but conscious    Airway patency: patent  Anesthetic complications: no  Cardiovascular status: hemodynamically stable  Respiratory status: acceptable  Hydration status: balanced    PONV: none          No notable events documented.     Nurse Pain Score: 5 (NPRS)
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
## 2024-06-07 NOTE — DISCHARGE PLANNING
S[poke with Benjie at [place], they are requesting updated PT/OT for insurance auth.  CHHAYA Harris advised.

## 2024-06-07 NOTE — CARE PLAN
The patient is Stable - Low risk of patient condition declining or worsening    Shift Goals  Clinical Goals: safety, pain control  Patient Goals: rest, comfort  Family Goals: no family present    Progress made toward(s) clinical / shift goals:    Problem: Skin Integrity  Goal: Skin integrity is maintained or improved  Outcome: Progressing    Educate the patient regarding the mobilization, and turning.        Patient is not progressing towards the following goals:

## 2024-06-07 NOTE — PROGRESS NOTES
Assumed care of pt from HS RN. He is awake and oriented x 4. On room air. Cast to left and and left leg. Denies numbness or tingling. Has pain 10/10 to left foot. Medicated, see mar. Correctly performs IS. Educated to do so 10 x per hour. Call light in reach and bed low and locked.       0804 notified of pts lab refusal (2nd time).

## 2024-06-07 NOTE — THERAPY
Physical Therapy Contact Note    Patient Name: Hayden Manzanares II  Age:  34 y.o., Sex:  male  Medical Record #: [id number]  Today's Date: 6/7/2024    Attempted to follow up for PT tx post-op L foot surgery. Pt in too much pain at this time. Will follow up as able.

## 2024-06-07 NOTE — DISCHARGE PLANNING
Case Management Discharge Planning    Admission Date: 5/23/2024  GMLOS: 5.6  ALOS: 15    6-Clicks ADL Score: 18  6-Clicks Mobility Score: 12  PT and/or OT Eval ordered: Yes  Post-acute Referrals Ordered: Yes  Post-acute Choice Obtained: Yes  Has referral(s) been sent to post-acute provider:  Yes    Anticipated Discharge Dispo: Discharge Disposition: D/T to SNF with Medicare cert in anticipation of skilled care (03)    DME Needed: No    Action(s) Taken: Pt was discussed during morning rounds, pt had surgery yesterday and is medically cleared to DC to post-acute placement today. LMSW requested DPA to follow up with [facility] regarding insurance auth and bed availability. Per DPA, [facility] is requesting updated PT/OT notes to begin insurance auth. LMSW informed provider and bedside RN, who confirmed that PT/OT will see pt today.    UPDATE 1245  LMSW received notice from OT that they attempted to work with pt however, pt was unable to participate due to increased pain despite IV pain meds. LMSW notified provider. Per [facility], pt is required to work with PT/OT to request insurance auth as they need updated notes following surgery for insurance purposes. Pt will not DC today. OT stated that they will meet with pt tomorrow once pain subsides to complete updated OT eval request.     Escalations Completed: None    Medically Clear: Yes    Next Steps: LMSW to follow for any additional CM needs.    Barriers to Discharge: Pending PT Evaluation and Pending Insurance Authorization    Is the patient up for discharge tomorrow: Potentially tomorrow, pending PT/OT evaluation.
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
## 2024-06-08 LAB
GLUCOSE BLD STRIP.AUTO-MCNC: 148 MG/DL (ref 65–99)
GLUCOSE BLD STRIP.AUTO-MCNC: 148 MG/DL (ref 65–99)

## 2024-06-08 PROCEDURE — 97530 THERAPEUTIC ACTIVITIES: CPT

## 2024-06-08 PROCEDURE — 770001 HCHG ROOM/CARE - MED/SURG/GYN PRIV*

## 2024-06-08 PROCEDURE — 700111 HCHG RX REV CODE 636 W/ 250 OVERRIDE (IP): Performed by: ORTHOPAEDIC SURGERY

## 2024-06-08 PROCEDURE — 82962 GLUCOSE BLOOD TEST: CPT

## 2024-06-08 PROCEDURE — 700101 HCHG RX REV CODE 250: Performed by: PHYSICIAN ASSISTANT

## 2024-06-08 PROCEDURE — 700111 HCHG RX REV CODE 636 W/ 250 OVERRIDE (IP): Mod: JZ | Performed by: PHYSICIAN ASSISTANT

## 2024-06-08 PROCEDURE — 700102 HCHG RX REV CODE 250 W/ 637 OVERRIDE(OP)

## 2024-06-08 PROCEDURE — 97535 SELF CARE MNGMENT TRAINING: CPT

## 2024-06-08 PROCEDURE — 700102 HCHG RX REV CODE 250 W/ 637 OVERRIDE(OP): Performed by: NURSE PRACTITIONER

## 2024-06-08 PROCEDURE — A9270 NON-COVERED ITEM OR SERVICE: HCPCS

## 2024-06-08 PROCEDURE — 99231 SBSQ HOSP IP/OBS SF/LOW 25: CPT | Performed by: NURSE PRACTITIONER

## 2024-06-08 PROCEDURE — A9270 NON-COVERED ITEM OR SERVICE: HCPCS | Performed by: NURSE PRACTITIONER

## 2024-06-08 PROCEDURE — 700105 HCHG RX REV CODE 258: Performed by: ORTHOPAEDIC SURGERY

## 2024-06-08 RX ORDER — HYDROMORPHONE HYDROCHLORIDE 1 MG/ML
1 INJECTION, SOLUTION INTRAMUSCULAR; INTRAVENOUS; SUBCUTANEOUS EVERY 6 HOURS PRN
Status: DISCONTINUED | OUTPATIENT
Start: 2024-06-08 | End: 2024-06-08

## 2024-06-08 RX ORDER — OXYCODONE HYDROCHLORIDE 10 MG/1
10 TABLET ORAL
Status: DISCONTINUED | OUTPATIENT
Start: 2024-06-08 | End: 2024-06-08

## 2024-06-08 RX ORDER — OXYCODONE HYDROCHLORIDE 15 MG/1
15 TABLET ORAL
Status: DISCONTINUED | OUTPATIENT
Start: 2024-06-08 | End: 2024-06-08

## 2024-06-08 RX ORDER — OXYCODONE HYDROCHLORIDE 10 MG/1
10 TABLET ORAL
Status: DISCONTINUED | OUTPATIENT
Start: 2024-06-08 | End: 2024-06-11

## 2024-06-08 RX ORDER — OXYCODONE HYDROCHLORIDE 15 MG/1
15 TABLET ORAL
Status: DISCONTINUED | OUTPATIENT
Start: 2024-06-08 | End: 2024-06-11

## 2024-06-08 RX ORDER — ACETAMINOPHEN 500 MG
1000 TABLET ORAL EVERY 6 HOURS
Status: DISPENSED | OUTPATIENT
Start: 2024-06-08 | End: 2024-06-09

## 2024-06-08 RX ORDER — CELECOXIB 200 MG/1
200 CAPSULE ORAL 2 TIMES DAILY
Status: COMPLETED | OUTPATIENT
Start: 2024-06-08 | End: 2024-06-09

## 2024-06-08 RX ORDER — HYDROMORPHONE HYDROCHLORIDE 1 MG/ML
.5-1 INJECTION, SOLUTION INTRAMUSCULAR; INTRAVENOUS; SUBCUTANEOUS EVERY 6 HOURS PRN
Status: DISCONTINUED | OUTPATIENT
Start: 2024-06-08 | End: 2024-06-11

## 2024-06-08 RX ADMIN — LIDOCAINE 1 PATCH: 4 PATCH TOPICAL at 14:56

## 2024-06-08 RX ADMIN — METAXALONE 800 MG: 800 TABLET ORAL at 17:05

## 2024-06-08 RX ADMIN — DOCUSATE SODIUM 100 MG: 100 CAPSULE, LIQUID FILLED ORAL at 04:32

## 2024-06-08 RX ADMIN — METAXALONE 800 MG: 800 TABLET ORAL at 12:34

## 2024-06-08 RX ADMIN — ACETAMINOPHEN 1000 MG: 500 TABLET, FILM COATED ORAL at 17:04

## 2024-06-08 RX ADMIN — DOCUSATE SODIUM 100 MG: 100 CAPSULE, LIQUID FILLED ORAL at 17:05

## 2024-06-08 RX ADMIN — METAXALONE 800 MG: 800 TABLET ORAL at 04:32

## 2024-06-08 RX ADMIN — SENNOSIDES AND DOCUSATE SODIUM 1 TABLET: 50; 8.6 TABLET ORAL at 20:27

## 2024-06-08 RX ADMIN — DULOXETINE HYDROCHLORIDE 30 MG: 30 CAPSULE, DELAYED RELEASE ORAL at 04:32

## 2024-06-08 RX ADMIN — GABAPENTIN 300 MG: 300 CAPSULE ORAL at 04:32

## 2024-06-08 RX ADMIN — ACETAMINOPHEN 1000 MG: 500 TABLET, FILM COATED ORAL at 09:09

## 2024-06-08 RX ADMIN — OXYCODONE HYDROCHLORIDE 15 MG: 15 TABLET ORAL at 09:12

## 2024-06-08 RX ADMIN — ACETAMINOPHEN 1000 MG: 500 TABLET, FILM COATED ORAL at 22:53

## 2024-06-08 RX ADMIN — CELECOXIB 200 MG: 200 CAPSULE ORAL at 09:09

## 2024-06-08 RX ADMIN — CELECOXIB 200 MG: 200 CAPSULE ORAL at 17:10

## 2024-06-08 RX ADMIN — ENOXAPARIN SODIUM 40 MG: 100 INJECTION SUBCUTANEOUS at 17:05

## 2024-06-08 RX ADMIN — ACETAMINOPHEN 1000 MG: 500 TABLET, FILM COATED ORAL at 12:34

## 2024-06-08 RX ADMIN — GABAPENTIN 300 MG: 300 CAPSULE ORAL at 20:27

## 2024-06-08 RX ADMIN — GABAPENTIN 300 MG: 300 CAPSULE ORAL at 14:54

## 2024-06-08 RX ADMIN — ENOXAPARIN SODIUM 40 MG: 100 INJECTION SUBCUTANEOUS at 04:32

## 2024-06-08 RX ADMIN — CEFAZOLIN 2 G: 2 INJECTION, POWDER, FOR SOLUTION INTRAMUSCULAR; INTRAVENOUS at 04:32

## 2024-06-08 ASSESSMENT — COGNITIVE AND FUNCTIONAL STATUS - GENERAL
MOVING FROM LYING ON BACK TO SITTING ON SIDE OF FLAT BED: A LITTLE
STANDING UP FROM CHAIR USING ARMS: TOTAL
DRESSING REGULAR LOWER BODY CLOTHING: A LOT
CLIMB 3 TO 5 STEPS WITH RAILING: TOTAL
TURNING FROM BACK TO SIDE WHILE IN FLAT BAD: A LITTLE
SUGGESTED CMS G CODE MODIFIER DAILY ACTIVITY: CK
DAILY ACTIVITIY SCORE: 19
WALKING IN HOSPITAL ROOM: TOTAL
MOVING TO AND FROM BED TO CHAIR: A LITTLE
SUGGESTED CMS G CODE MODIFIER MOBILITY: CL
HELP NEEDED FOR BATHING: A LOT
TOILETING: A LITTLE
MOBILITY SCORE: 12

## 2024-06-08 ASSESSMENT — PAIN DESCRIPTION - PAIN TYPE
TYPE: SURGICAL PAIN
TYPE: SURGICAL PAIN
TYPE: ACUTE PAIN

## 2024-06-08 ASSESSMENT — GAIT ASSESSMENTS: GAIT LEVEL OF ASSIST: UNABLE TO PARTICIPATE

## 2024-06-08 NOTE — CARE PLAN
The patient is Stable - Low risk of patient condition declining or worsening    Shift Goals  Clinical Goals: safety, pain control  Patient Goals: rest, comfort  Family Goals: no family present    Progress made toward(s) clinical / shift goals:    Problem: Skin Integrity  Goal: Skin integrity is maintained or improved  Outcome: Progressing    Educate the patient the importance of mobility       Patient is not progressing towards the following goals:

## 2024-06-08 NOTE — THERAPY
"Physical Therapy   Daily Treatment     Patient Name: Hayden Manzanares II  Age:  34 y.o., Sex:  male  Medical Record #: [id number]  Today's Date: 6/8/2024     Precautions  Precautions: Fall Risk;Non Weight Bearing Right Lower Extremity;Non Weight Bearing Left Lower Extremity;CAM Boot;Platform Weight Bearing Left Upper Extremity;Weight Bearing As Tolerated Right Upper Extremity  Comments: HKB RLE, B CAM boots    Assessment  Pt seen for PT tx session following L foot 1st metatarsal ORIF on 6/6. Pt reports pain is improved today and he was able to tolerate bed mobility and WC transfers. Pt needs cues to maintain PFWB LUE as he tends to use his hand to scoot and states that it does not hurt. Pt re educated on purpose of WB precautions to protect his injuries and prevent further harm. Continue to recommend post acute placement at this time, pt stating he wants to move to his parents' home in [place] after he rehabs. Will follow.     Plan    Treatment Plan Status: Continue Current Treatment Plan  Type of Treatment: Bed Mobility, Equipment, Family / Caregiver Training, Neuro Re-Education / Balance, Self Care / Home Evaluation, Therapeutic Activities, Therapeutic Exercise  Treatment Frequency: 4 Times per Week  Treatment Duration: Until Therapy Goals Met    DC Equipment Recommendations: Wheelchair (18\" wheelchair with elevating leg rests and removable arm rests)  Discharge Recommendations: Recommend post-acute placement for additional physical therapy services prior to discharge home        Vitals   O2 (LPM) 0   O2 Delivery Device None - Room Air   Pain 0 - 10 Group   Therapist Pain Assessment   (no c/o pain)   Cognition    Cognition / Consciousness WDL   Comments somewhat flat affect   Balance   Sitting Balance (Static) Good   Sitting Balance (Dynamic) Fair +   Weight Shift Sitting Fair   Skilled Intervention Verbal Cuing   Comments standing NT due to NWB BLE   Bed Mobility    Supine to Sit Supervised   Sit to Supine Supervised " "  Scooting Supervised   Comments needs cues to not use L hand when scooting in bed   Gait Analysis   Gait Level Of Assist Unable to Participate   Functional Mobility   Bed, Chair, Wheelchair Transfer Contact Guard Assist   Transfer Method Sit Pivot   Mobility eob<>wc   Wheelchair Assist Minimal Assist   Distance Wheelchair (Feet or Distance) 100   Skilled Intervention Verbal Cuing;Tactile Cuing   6 Clicks Assessment - How much HELP from from another person do you currently need... (If the patient hasn't done an activity recently, how much help from another person do you think he/she would need if he/she tried?)   Turning from your back to your side while in a flat bed without using bedrails? 3   Moving from lying on your back to sitting on the side of a flat bed without using bedrails? 3   Moving to and from a bed to a chair (including a wheelchair)? 3   Standing up from a chair using your arms (e.g., wheelchair, or bedside chair)? 1   Walking in hospital room? 1   Climbing 3-5 steps with a railing? 1   6 clicks Mobility Score 12   Short Term Goals    Short Term Goal # 1 Pt will perform bed mobility from a flat bed with supervision to progress to home in 6 visits.   Goal Outcome # 1 Goal met   Short Term Goal # 2 Pt will transfer via slide board to the wheelchair with supervision to progress independence in 6 visits.   Goal Outcome # 2 Progressing as expected   Short Term Goal # 3 Pt will propel manual wheelchair 100ft with supervision for short household distances in 6 visits.   Goal Outcome # 3 Progressing as expected   Physical Therapy Treatment Plan   Physical Therapy Treatment Plan Continue Current Treatment Plan   Anticipated Discharge Equipment and Recommendations   DC Equipment Recommendations Wheelchair  (18\" wheelchair with elevating leg rests and removable arm rests)   Discharge Recommendations Recommend post-acute placement for additional physical therapy services prior to discharge home "   Interdisciplinary Plan of Care Collaboration   IDT Collaboration with  Nursing   Patient Position at End of Therapy In Bed;Bed Alarm On;Call Light within Reach;Tray Table within Reach;Phone within Reach   Collaboration Comments RN updated   Session Information   Date / Session Number  6/8- 4 (3/4, 6/9) att 6/6, 6/7

## 2024-06-08 NOTE — THERAPY
"Occupational Therapy  Daily Treatment     Patient Name: Hayden Manzanares II  Age:  34 y.o., Sex:  male  Medical Record #: [id number]  Today's Date: 6/8/2024     Precautions  Precautions: (P) Fall Risk, Non Weight Bearing Right Lower Extremity, Non Weight Bearing Left Lower Extremity, Platform Weight Bearing Left Upper Extremity, CAM Boot, Weight Bearing As Tolerated Right Upper Extremity  Comments: (P) HKB at 20 deg RLE, B CAM boots    Assessment    Pt seen for OT tx. Pt progressing well, mostly limited due to WB precautions. Pt required cues for now WB through L hand, platform WB/ elbow forearm only. Pt required modA for LB dressing at bed level, demo'd SBA for functional transfer to commode, more difficulty transferring BTB. Pt will continue to benefit from inpt OT     Plan    Treatment Plan Status: (P) Continue Current Treatment Plan  Type of Treatment: Self Care / Activities of Daily Living, Adaptive Equipment, Therapeutic Exercises, Therapeutic Activity  Treatment Frequency: 3 Times per Week  Treatment Duration: Until Therapy Goals Met    DC Equipment Recommendations: (P) Bed Side Commode (drop arm commode)  Discharge Recommendations: (P) Recommend post-acute placement for additional occupational therapy services prior to discharge home    Subjective    \"Id like to put pants on\"      Objective       06/08/24 1501   Precautions   Precautions Fall Risk;Non Weight Bearing Right Lower Extremity;Non Weight Bearing Left Lower Extremity;Platform Weight Bearing Left Upper Extremity;CAM Boot;Weight Bearing As Tolerated Right Upper Extremity   Comments HKB at 20 deg RLE, B CAM boots   Pain   Intervention Rest   Pain 0 - 10 Group   Location Foot   Location Orientation Left   Therapist Pain Assessment During Activity;Post Activity Pain Same as Prior to Activity;Nurse Notified;2   Non Verbal Descriptors   Non Verbal Scale  Calm   Cognition    Cognition / Consciousness WDL   Level of Consciousness Alert   Comments pt with flat " affect however pleasant and cooperative throughout, able to make needs known and follows all commands   Other Treatments   Other Treatments Provided assisted with CAM boots and hinged knee brace, discussed safety with LUE as pt tends to put too much WB through hand   Balance   Sitting Balance (Static) Good   Sitting Balance (Dynamic) Fair +   Weight Shift Sitting Fair   Skilled Intervention Tactile Cuing;Verbal Cuing   Bed Mobility    Supine to Sit Supervised   Sit to Supine Supervised   Scooting Supervised   Comments cues for NWB through L hand   Activities of Daily Living   Eating Independent   Grooming Supervision;Seated   Lower Body Dressing Moderate Assist  (to get pants on, able to pull up at bed level)   Toileting   (declined)   Skilled Intervention Verbal Cuing;Tactile Cuing   Functional Mobility   Sit to Stand Unable to Participate   Toilet Transfers Standby Assist  (drop arm commode)   Transfer Method Sit Pivot   Mobility EOB> commode >supine   Skilled Intervention Compensatory Strategies;Verbal Cuing;Tactile Cuing   Activity Tolerance   Sitting Edge of Bed 3 min   Patient / Family Goals   Patient / Family Goal #1 To go home   Goal #1 Outcome Progressing as expected   Short Term Goals   Short Term Goal # 1 Pt will complete ADL txfs with supv   Goal Outcome # 1 Progressing as expected   Short Term Goal # 2 Pt will complete LB dressing with supv using AE PRN   Goal Outcome # 2 Progressing as expected   Short Term Goal # 3 Pt will complete toileting ADLs with CGA   Goal Outcome # 3 Progressing as expected   Education Group   Role of Occupational Therapist Patient Response Patient;Acceptance;Explanation;Verbal Demonstration   Transfers Patient Response Patient;Acceptance;Explanation;Verbal Demonstration;Action Demonstration;Reinforcement Needed   ADL Patient Response Patient;Acceptance;Explanation;Reinforcement Needed   Weight Bearing Precautions Patient Response  Patient;Acceptance;Explanation;Demonstration;Reinforcement Needed;Verbal Demonstration   Pathology of Bedrest Patient Response Patient;Acceptance;Explanation;Verbal Demonstration   Occupational Therapy Treatment Plan    O.T. Treatment Plan Continue Current Treatment Plan   Anticipated Discharge Equipment and Recommendations   DC Equipment Recommendations Bed Side Commode  (drop arm commode)   Discharge Recommendations Recommend post-acute placement for additional occupational therapy services prior to discharge home   Interdisciplinary Plan of Care Collaboration   IDT Collaboration with  Nursing   Patient Position at End of Therapy In Bed;Bed Alarm On;Tray Table within Reach;Call Light within Reach   Collaboration Comments RN updated   Session Information   Date / Session Number  6/8, #4 (3/3, 6/9)
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
## 2024-06-09 PROCEDURE — 770001 HCHG ROOM/CARE - MED/SURG/GYN PRIV*

## 2024-06-09 PROCEDURE — 700102 HCHG RX REV CODE 250 W/ 637 OVERRIDE(OP)

## 2024-06-09 PROCEDURE — 700101 HCHG RX REV CODE 250: Performed by: PHYSICIAN ASSISTANT

## 2024-06-09 PROCEDURE — 700102 HCHG RX REV CODE 250 W/ 637 OVERRIDE(OP): Performed by: NURSE PRACTITIONER

## 2024-06-09 PROCEDURE — A9270 NON-COVERED ITEM OR SERVICE: HCPCS | Performed by: NURSE PRACTITIONER

## 2024-06-09 PROCEDURE — 99231 SBSQ HOSP IP/OBS SF/LOW 25: CPT | Performed by: NURSE PRACTITIONER

## 2024-06-09 PROCEDURE — A9270 NON-COVERED ITEM OR SERVICE: HCPCS

## 2024-06-09 PROCEDURE — 700111 HCHG RX REV CODE 636 W/ 250 OVERRIDE (IP): Mod: JZ | Performed by: PHYSICIAN ASSISTANT

## 2024-06-09 RX ADMIN — SENNOSIDES AND DOCUSATE SODIUM 1 TABLET: 50; 8.6 TABLET ORAL at 20:37

## 2024-06-09 RX ADMIN — OXYCODONE HYDROCHLORIDE 15 MG: 15 TABLET ORAL at 20:37

## 2024-06-09 RX ADMIN — DOCUSATE SODIUM 100 MG: 100 CAPSULE, LIQUID FILLED ORAL at 16:30

## 2024-06-09 RX ADMIN — GABAPENTIN 300 MG: 300 CAPSULE ORAL at 05:40

## 2024-06-09 RX ADMIN — OXYCODONE HYDROCHLORIDE 15 MG: 15 TABLET ORAL at 12:29

## 2024-06-09 RX ADMIN — ENOXAPARIN SODIUM 40 MG: 100 INJECTION SUBCUTANEOUS at 16:30

## 2024-06-09 RX ADMIN — LIDOCAINE 2 PATCH: 4 PATCH TOPICAL at 12:46

## 2024-06-09 RX ADMIN — ACETAMINOPHEN 1000 MG: 500 TABLET, FILM COATED ORAL at 12:29

## 2024-06-09 RX ADMIN — CELECOXIB 200 MG: 200 CAPSULE ORAL at 16:30

## 2024-06-09 RX ADMIN — OXYCODONE HYDROCHLORIDE 15 MG: 15 TABLET ORAL at 16:30

## 2024-06-09 RX ADMIN — GABAPENTIN 300 MG: 300 CAPSULE ORAL at 20:37

## 2024-06-09 RX ADMIN — DOCUSATE SODIUM 100 MG: 100 CAPSULE, LIQUID FILLED ORAL at 05:40

## 2024-06-09 RX ADMIN — ENOXAPARIN SODIUM 40 MG: 100 INJECTION SUBCUTANEOUS at 05:39

## 2024-06-09 RX ADMIN — METAXALONE 800 MG: 800 TABLET ORAL at 12:30

## 2024-06-09 RX ADMIN — METAXALONE 800 MG: 800 TABLET ORAL at 16:30

## 2024-06-09 RX ADMIN — OXYCODONE HYDROCHLORIDE 10 MG: 10 TABLET ORAL at 05:43

## 2024-06-09 RX ADMIN — DULOXETINE HYDROCHLORIDE 30 MG: 30 CAPSULE, DELAYED RELEASE ORAL at 05:39

## 2024-06-09 RX ADMIN — METAXALONE 800 MG: 800 TABLET ORAL at 05:40

## 2024-06-09 RX ADMIN — CELECOXIB 200 MG: 200 CAPSULE ORAL at 05:39

## 2024-06-09 RX ADMIN — GABAPENTIN 300 MG: 300 CAPSULE ORAL at 12:30

## 2024-06-09 ASSESSMENT — ENCOUNTER SYMPTOMS
GASTROINTESTINAL NEGATIVE: 1
CARDIOVASCULAR NEGATIVE: 1

## 2024-06-09 ASSESSMENT — PAIN DESCRIPTION - PAIN TYPE
TYPE: SURGICAL PAIN
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
## 2024-06-09 NOTE — PROGRESS NOTES
Patient A&Ox4. Patient stated he has pain but he refused prn pain med at this time.  Meds administered per MAR.  At 2026, Patient's HR high. He looked drowsy and his reaction was slow. Informed Linsey Wegener trauma APRN about it. She asked patient's sugar. Sugar checked and it was 148 also rechecked patient's HR. HR came down to 97. Linsey Wegener trauma APRN stated keep monitoring him and she will ask the rapid nurse to keep an eye on him as well.  Huong came by to check on this patient.

## 2024-06-09 NOTE — CARE PLAN
The patient is Stable - Low risk of patient condition declining or worsening    Shift Goals  Clinical Goals: safety, mobility  Patient Goals: rest  Family Goals: NA    Progress made toward(s) clinical / shift goals:  yes  Problem: Skin Integrity  Goal: Skin integrity is maintained or improved  Outcome: Progressing     Problem: Wound/ / Incision Healing  Goal: Patient's wound/surgical incision will decrease in size and heals properly  Outcome: Progressing       Patient is not progressing towards the following goals:
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
## 2024-06-09 NOTE — DISCHARGE PLANNING
[place] PASRR: [id number]   LOC: [id number]    0919: Medically cleared for post-acute placement per provider. Accepted with and choice obtained for [facility]. JUAN F SHAVER contacted Benjie FERNANDES With [employer] who states she will submit for authorization Monday, 6/10/2024, as [place] is closed today (Sunday).   Avoidable days updated in Epic.

## 2024-06-09 NOTE — CARE PLAN
Patient Instructions by Laura Figueroa MA at 03/03/17 11:53 AM     Author:  Laura Figueroa MA Service:  (none) Author Type:  Medical Assistant     Filed:  03/03/17 11:53 AM Encounter Date:  3/3/2017 Status:  Signed     :  Laura Figueroa MA (Medical Assistant)            TREATMENT ORDERS    Forms    After Injection Instruction  You have been given an injection (shot) at the [facility] department.  Injections are given into joints, tendons, or soft tissue for the treatment of pain and inflammation.  The medicine is a corticosteroid, but is often called a steroid or cortisone shot.  The steroid used may be Dexamethasone or Depo-Medrol.  A fast acting pain killer such as Lidocaine may be injected with the steroid to dull the pain for a few hours.  A long acting anesthetic, such as Marcaine, may also be injected with the steroid.  It may last up to 30 hours or wear off as soon as 6 hours.      · The steroid begins to work in 24 to 48 hours and may take 2 weeks to reach full effect.    · Take all of your regular medications, including pain medicine (unless instructed otherwise).    · Some increased pain may occur in the first 24 hours after the injection.  You may apply a cold pack or ice to the injected area for 15 to 20 minutes every 1 to 2 hours for 24 hours to lessen the pain.    · For people with diabetes, your blood sugar may be increased for 1 to 2 days. If you have any questions regarding your blood sugar, please contact your primary care physician.      Call your Orthopaedic physician if you develop any of the following symptoms:  · Fever  · Increased redness of injection site  · If it is not better in 2 weeks      Follow-Up  Call or return to the clinic as needed if these symptoms worsen, fail to improve as anticipated, or if new symptoms develop.    Time left: 3/3/2017 11:53 AM     Next appointment:        Location:        Provider:         Please note: 24 hour notice for  The patient is Stable - Low risk of patient condition declining or worsening    Shift Goals  Clinical Goals: safety, mobility  Patient Goals: rest  Family Goals: NA    Progress made toward(s) clinical / shift goals:    Problem: Skin Integrity  Goal: Skin integrity is maintained or improved  Outcome: Progressing     Problem: Communication  Goal: The ability to communicate needs accurately and effectively will improve  Outcome: Progressing     Problem: Discharge Barriers/Planning  Goal: Patient's continuum of care needs are met  Outcome: Progressing     Problem: Wound/ / Incision Healing  Goal: Patient's wound/surgical incision will decrease in size and heals properly  Outcome: Progressing       Patient is not progressing towards the following goals:       cancellation of appointment is required.      Do not hesitate to call if you are experiencing severe pain, worsening or change in your pain, have symptoms of infection (fever, warmth, redness, increased drainage), or have any other problem that concerns you ~ [phone] (or [phone] after hours).    Please remember when requesting refills on pain medication that the request should be made by Thursday at the latest. [facility] is open Monday-Friday, 8am-5pm, and closed on the weekends.  No narcotic refills will be filled after hours.    Additional Educational Resources:  For additional resources regarding your symptoms, diagnosis, or further health information, please visit the Health Resources section on [email or web address] or the Online Health Resources section in [facility].          Revision History        User Key Date/Time User Provider Type Action    > [N/A] 03/03/17 11:53 AM Laura Figueroa MA Medical Assistant Sign

## 2024-06-09 NOTE — PROGRESS NOTES
"    Orthopedic PA Progress Note    Interval changes:  Patient doing well   LLE splint and dressings are CDI  NWB BLE  - hinged knee brace locked at 20 degrees to right knee, right foot in boot, left foot in splint  - PFWB LUE  No pending ortho procedures  Follow up with Dr. Pollard 2 weeks postop  Ok for d/c planning from ortho POV    ROS - Patient denies any new issues.  Denies any numbness or tingling. Pain well controlled.    /71   Pulse 68   Temp 36.5 °C (97.7 °F) (Temporal)   Resp 18   Ht 1.753 m (5' 9.02\")   Wt 77.1 kg (170 lb)   SpO2 95%     Patient seen and examined  No acute distress  Breathing non labored  RRR  LUE: Splint intact. Flexes and extends all fingers. Sensation intact. Cap refill <2s.   LLE: Splint and dressings CDI. Flexes and extends all toes. Sensation intact . Cap refill <2s.  RLE: knee brace and boot in place. Patient clearly fires tibialis anterior, EHL, and gastrocnemius/soleus. Sensation is intact to light touch throughout superficial peroneal, deep peroneal, tibial, saphenous, and sural nerve distributions. Strong and palpable 2+ dorsalis pedis and posterior tibial pulses with capillary refill less than 2 seconds.          Active Hospital Problems    Diagnosis     Discharge planning issues [Z75.8]      Priority: High    Multiple fractures of left foot, closed, initial encounter [S92.902A]      Priority: High    Closed fracture of right tibial plateau [S82.141A]      Priority: Medium    Fracture of rib of left side [S22.32XA]      Priority: Medium    Closed left ankle fracture [S82.892A]      Priority: Medium    Sacral fracture, closed (HCC) [S32.10XA]      Priority: Medium    Multiple lacerations [T07.XXXA]      Priority: Medium    Multiple closed fractures of metacarpal bones [S62.309A]      Priority: Medium    Closed fracture of right scapula [S42.101A]      Priority: Medium    Closed fracture of right ankle [S82.891A]      Priority: Medium    Evaluation by psychiatric " service required [Z00.8]      Priority: Low    Trauma [T14.90XA]      Priority: Low    No contraindication to deep vein thrombosis (DVT) prophylaxis [Z78.9]      Priority: Low    Compression fracture of T8 vertebra (HCC) [S22.060A]      Priority: Low       Assessment/Plan:  Patient doing well   LLE splint and dressings are CDI  NWB BLE  - hinged knee brace locked at 20 degrees to right knee, right foot in boot, left foot in splint  - PFWB LUE  No pending ortho procedures  Follow up with Dr. Pollard 2 weeks postop  Ok for d/c planning from ortho POV    POD3 S/p  1.  Open treatment and internal fixation of left first metatarsal fracture.  2.  Open treatment and internal fixation of left first tarsometatarsal joint.  POD#9 S/p  Closed treatment with manipulation, left fourth and   fifth metacarpal fractures and application of short arm splint.  POD#15 S/p  1.  Open treatment and internal fixation of left bimalleolar ankle fracture.  2.  Open treatment and internal fixation of left distal tibiofibular joint   disruption.  3.  Excisional debridement of left leg laceration measuring 5x1.5 cm including   skin and subcutaneous tissue.  4.  Layered repair of intermediate complexity laceration measuring 5 cm, left   leg  Wt bearing status - NWB BLE, WBAT RUE, Forearm WB LUE  - hinged knee brace locked at 20 degrees to right knee, right foot in boot, left foot in splint  PFWB LUE  Wound care/Drains - Dressings to be left in place  Future Procedures - 6/6  Lovenox: Start 5/24  Sutures/Staples out- 14-21 days post operatively. Removal will completed by ortho GENIA's unless transferred.  DVT Prophylaxis outpatient: ASA 81 mg PO BID x4 weeks  PT/OT-initiated  Antibiotics:  Perioperative completed  DVT Prophylaxis- TEDS/SCDs/Foot pumps  Campos-not needed per ortho  Case Coordination for Discharge Planning - Disposition per therapy recs.

## 2024-06-10 LAB
ANION GAP SERPL CALC-SCNC: 12 MMOL/L (ref 7–16)
ANION GAP SERPL CALC-SCNC: 12 MMOL/L (ref 7–16)
BASOPHILS # BLD AUTO: 0.4 % (ref 0–1.8)
BASOPHILS # BLD AUTO: 0.4 % (ref 0–1.8)
BASOPHILS # BLD: 0.02 K/UL (ref 0–0.12)
BASOPHILS # BLD: 0.02 K/UL (ref 0–0.12)
BUN SERPL-MCNC: 14 MG/DL (ref 8–22)
BUN SERPL-MCNC: 14 MG/DL (ref 8–22)
CALCIUM SERPL-MCNC: 8.7 MG/DL (ref 8.5–10.5)
CALCIUM SERPL-MCNC: 8.7 MG/DL (ref 8.5–10.5)
CHLORIDE SERPL-SCNC: 104 MMOL/L (ref 96–112)
CHLORIDE SERPL-SCNC: 104 MMOL/L (ref 96–112)
CO2 SERPL-SCNC: 22 MMOL/L (ref 20–33)
CO2 SERPL-SCNC: 22 MMOL/L (ref 20–33)
CREAT SERPL-MCNC: 0.7 MG/DL (ref 0.5–1.4)
CREAT SERPL-MCNC: 0.7 MG/DL (ref 0.5–1.4)
EOSINOPHIL # BLD AUTO: 0.16 K/UL (ref 0–0.51)
EOSINOPHIL # BLD AUTO: 0.16 K/UL (ref 0–0.51)
EOSINOPHIL NFR BLD: 2.9 % (ref 0–6.9)
EOSINOPHIL NFR BLD: 2.9 % (ref 0–6.9)
ERYTHROCYTE [DISTWIDTH] IN BLOOD BY AUTOMATED COUNT: 46.3 FL (ref 35.9–50)
ERYTHROCYTE [DISTWIDTH] IN BLOOD BY AUTOMATED COUNT: 46.3 FL (ref 35.9–50)
GFR SERPLBLD CREATININE-BSD FMLA CKD-EPI: 125 ML/MIN/1.73 M 2
GFR SERPLBLD CREATININE-BSD FMLA CKD-EPI: 125 ML/MIN/1.73 M 2
GLUCOSE SERPL-MCNC: 89 MG/DL (ref 65–99)
GLUCOSE SERPL-MCNC: 89 MG/DL (ref 65–99)
HCT VFR BLD AUTO: 32.5 % (ref 42–52)
HCT VFR BLD AUTO: 32.5 % (ref 42–52)
HGB BLD-MCNC: 10.8 G/DL (ref 14–18)
HGB BLD-MCNC: 10.8 G/DL (ref 14–18)
IMM GRANULOCYTES # BLD AUTO: 0.05 K/UL (ref 0–0.11)
IMM GRANULOCYTES # BLD AUTO: 0.05 K/UL (ref 0–0.11)
IMM GRANULOCYTES NFR BLD AUTO: 0.9 % (ref 0–0.9)
IMM GRANULOCYTES NFR BLD AUTO: 0.9 % (ref 0–0.9)
LYMPHOCYTES # BLD AUTO: 1.61 K/UL (ref 1–4.8)
LYMPHOCYTES # BLD AUTO: 1.61 K/UL (ref 1–4.8)
LYMPHOCYTES NFR BLD: 28.9 % (ref 22–41)
LYMPHOCYTES NFR BLD: 28.9 % (ref 22–41)
MCH RBC QN AUTO: 29.2 PG (ref 27–33)
MCH RBC QN AUTO: 29.2 PG (ref 27–33)
MCHC RBC AUTO-ENTMCNC: 33.2 G/DL (ref 32.3–36.5)
MCHC RBC AUTO-ENTMCNC: 33.2 G/DL (ref 32.3–36.5)
MCV RBC AUTO: 87.8 FL (ref 81.4–97.8)
MCV RBC AUTO: 87.8 FL (ref 81.4–97.8)
MONOCYTES # BLD AUTO: 0.42 K/UL (ref 0–0.85)
MONOCYTES # BLD AUTO: 0.42 K/UL (ref 0–0.85)
MONOCYTES NFR BLD AUTO: 7.5 % (ref 0–13.4)
MONOCYTES NFR BLD AUTO: 7.5 % (ref 0–13.4)
NEUTROPHILS # BLD AUTO: 3.32 K/UL (ref 1.82–7.42)
NEUTROPHILS # BLD AUTO: 3.32 K/UL (ref 1.82–7.42)
NEUTROPHILS NFR BLD: 59.4 % (ref 44–72)
NEUTROPHILS NFR BLD: 59.4 % (ref 44–72)
NRBC # BLD AUTO: 0 K/UL
NRBC # BLD AUTO: 0 K/UL
NRBC BLD-RTO: 0 /100 WBC (ref 0–0.2)
NRBC BLD-RTO: 0 /100 WBC (ref 0–0.2)
PLATELET # BLD AUTO: 382 K/UL (ref 164–446)
PLATELET # BLD AUTO: 382 K/UL (ref 164–446)
PMV BLD AUTO: 9.7 FL (ref 9–12.9)
PMV BLD AUTO: 9.7 FL (ref 9–12.9)
POTASSIUM SERPL-SCNC: 4.1 MMOL/L (ref 3.6–5.5)
POTASSIUM SERPL-SCNC: 4.1 MMOL/L (ref 3.6–5.5)
RBC # BLD AUTO: 3.7 M/UL (ref 4.7–6.1)
RBC # BLD AUTO: 3.7 M/UL (ref 4.7–6.1)
SODIUM SERPL-SCNC: 138 MMOL/L (ref 135–145)
SODIUM SERPL-SCNC: 138 MMOL/L (ref 135–145)
WBC # BLD AUTO: 5.6 K/UL (ref 4.8–10.8)
WBC # BLD AUTO: 5.6 K/UL (ref 4.8–10.8)

## 2024-06-10 PROCEDURE — 700102 HCHG RX REV CODE 250 W/ 637 OVERRIDE(OP): Performed by: NURSE PRACTITIONER

## 2024-06-10 PROCEDURE — 700111 HCHG RX REV CODE 636 W/ 250 OVERRIDE (IP): Mod: JZ | Performed by: PHYSICIAN ASSISTANT

## 2024-06-10 PROCEDURE — 700102 HCHG RX REV CODE 250 W/ 637 OVERRIDE(OP)

## 2024-06-10 PROCEDURE — A9270 NON-COVERED ITEM OR SERVICE: HCPCS | Performed by: NURSE PRACTITIONER

## 2024-06-10 PROCEDURE — 99231 SBSQ HOSP IP/OBS SF/LOW 25: CPT | Performed by: NURSE PRACTITIONER

## 2024-06-10 PROCEDURE — 770001 HCHG ROOM/CARE - MED/SURG/GYN PRIV*

## 2024-06-10 PROCEDURE — A9270 NON-COVERED ITEM OR SERVICE: HCPCS

## 2024-06-10 PROCEDURE — 85025 COMPLETE CBC W/AUTO DIFF WBC: CPT

## 2024-06-10 PROCEDURE — 36415 COLL VENOUS BLD VENIPUNCTURE: CPT

## 2024-06-10 PROCEDURE — 80048 BASIC METABOLIC PNL TOTAL CA: CPT

## 2024-06-10 RX ADMIN — OXYCODONE HYDROCHLORIDE 15 MG: 15 TABLET ORAL at 12:01

## 2024-06-10 RX ADMIN — METAXALONE 800 MG: 800 TABLET ORAL at 16:22

## 2024-06-10 RX ADMIN — GABAPENTIN 300 MG: 300 CAPSULE ORAL at 21:12

## 2024-06-10 RX ADMIN — DOCUSATE SODIUM 100 MG: 100 CAPSULE, LIQUID FILLED ORAL at 16:22

## 2024-06-10 RX ADMIN — SENNOSIDES AND DOCUSATE SODIUM 1 TABLET: 50; 8.6 TABLET ORAL at 21:12

## 2024-06-10 RX ADMIN — OXYCODONE HYDROCHLORIDE 15 MG: 15 TABLET ORAL at 16:22

## 2024-06-10 RX ADMIN — METAXALONE 800 MG: 800 TABLET ORAL at 04:15

## 2024-06-10 RX ADMIN — DOCUSATE SODIUM 100 MG: 100 CAPSULE, LIQUID FILLED ORAL at 04:15

## 2024-06-10 RX ADMIN — ENOXAPARIN SODIUM 40 MG: 100 INJECTION SUBCUTANEOUS at 04:16

## 2024-06-10 RX ADMIN — METAXALONE 800 MG: 800 TABLET ORAL at 12:01

## 2024-06-10 RX ADMIN — DULOXETINE HYDROCHLORIDE 30 MG: 30 CAPSULE, DELAYED RELEASE ORAL at 04:15

## 2024-06-10 RX ADMIN — GABAPENTIN 300 MG: 300 CAPSULE ORAL at 04:16

## 2024-06-10 RX ADMIN — GABAPENTIN 300 MG: 300 CAPSULE ORAL at 12:02

## 2024-06-10 RX ADMIN — OXYCODONE HYDROCHLORIDE 15 MG: 15 TABLET ORAL at 21:12

## 2024-06-10 RX ADMIN — ENOXAPARIN SODIUM 40 MG: 100 INJECTION SUBCUTANEOUS at 16:22

## 2024-06-10 ASSESSMENT — ENCOUNTER SYMPTOMS
GASTROINTESTINAL NEGATIVE: 1
CARDIOVASCULAR NEGATIVE: 1

## 2024-06-10 ASSESSMENT — PAIN DESCRIPTION - PAIN TYPE: TYPE: SURGICAL PAIN
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
## 2024-06-10 NOTE — CARE PLAN
The patient is Stable - Low risk of patient condition declining or worsening    Shift Goals  Clinical Goals: pain control, mobilize  Patient Goals: rest, pain control  Family Goals: NA    Progress made toward(s) clinical / shift goals:    Problem: Discharge Barriers/Planning  Goal: Patient's continuum of care needs are met  Outcome: Progressing    working on placement. Discussed with patient- insurance pending.  Problem: Pain - Standard  Goal: Alleviation of pain or a reduction in pain to the patient’s comfort goal  Outcome: Progressing  Oxy given PRN with +results. Educated pt on importance of pain control- pt verbalized understanding.      Patient is not progressing towards the following goals:

## 2024-06-10 NOTE — PROGRESS NOTES
Patient A&OX4. Patient stated pain. Meds administered per MAR as well as pain med.  Urinal at the bedside.  Water and call light within reach.    Patient refused lab this morning. He asked phlebotomist to come back later. Rescheduled at 0800
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
## 2024-06-10 NOTE — CARE PLAN
The patient is Stable - Low risk of patient condition declining or worsening    Shift Goals  Clinical Goals: safety, mobility, pain control  Patient Goals: rest, pain control  Family Goals: NA    Progress made toward(s) clinical / shift goals:  yes  Problem: Skin Integrity  Goal: Skin integrity is maintained or improved  Outcome: Progressing     Problem: Pain - Standard  Goal: Alleviation of pain or a reduction in pain to the patient’s comfort goal  Outcome: Progressing       Patient is not progressing towards the following goals:

## 2024-06-10 NOTE — DISCHARGE PLANNING
Case Management Discharge Planning    Admission Date: 5/23/2024  GMLOS: 5.6  ALOS: 18    6-Clicks ADL Score: 19  6-Clicks Mobility Score: 12  PT and/or OT Eval ordered: Yes  Post-acute Referrals Ordered: Yes  Post-acute Choice Obtained: Yes  Has referral(s) been sent to post-acute provider:  Yes      Anticipated Discharge Dispo: Discharge Disposition: D/T to SNF with Medicare cert in anticipation of skilled care (03)    DME Needed: No    Action(s) Taken: Pt was discussed during IDT rounds, pt is medically cleared to DC to post-acute placement. LMSW received phone call from Benjie at [facility] stating that they are no longer able to accept pt d/t conflict of interest. Pt does not have any other accepting facilities at this time. LMSW escalated this DC barrier to leadership, recommended to expand referrals and request re-evaluation at [facility] for potential admission.    UPDATE 1530  LMSW met with pt at bedside to discuss DCP. Pt states that he is still agreeable to DC to post-acute placement. Pt also stated that his parents are coming from [place] tomorrow to provide support while in the hospital. Regarding acceptance at [facility], referral is currently pending for reconsideration.     Escalations Completed: None    Medically Clear: Yes    Next Steps: LMSW to follow for SNF acceptance.    Barriers to Discharge: Pending Placement    Is the patient up for discharge tomorrow: No

## 2024-06-11 PROCEDURE — 97535 SELF CARE MNGMENT TRAINING: CPT

## 2024-06-11 PROCEDURE — 770001 HCHG ROOM/CARE - MED/SURG/GYN PRIV*

## 2024-06-11 PROCEDURE — 700102 HCHG RX REV CODE 250 W/ 637 OVERRIDE(OP): Performed by: NURSE PRACTITIONER

## 2024-06-11 PROCEDURE — A9270 NON-COVERED ITEM OR SERVICE: HCPCS | Performed by: NURSE PRACTITIONER

## 2024-06-11 PROCEDURE — 97530 THERAPEUTIC ACTIVITIES: CPT

## 2024-06-11 PROCEDURE — 700102 HCHG RX REV CODE 250 W/ 637 OVERRIDE(OP)

## 2024-06-11 PROCEDURE — 97530 THERAPEUTIC ACTIVITIES: CPT | Mod: CQ

## 2024-06-11 PROCEDURE — 700111 HCHG RX REV CODE 636 W/ 250 OVERRIDE (IP): Mod: JZ | Performed by: PHYSICIAN ASSISTANT

## 2024-06-11 PROCEDURE — 700111 HCHG RX REV CODE 636 W/ 250 OVERRIDE (IP): Performed by: NURSE PRACTITIONER

## 2024-06-11 PROCEDURE — A9270 NON-COVERED ITEM OR SERVICE: HCPCS

## 2024-06-11 PROCEDURE — 99231 SBSQ HOSP IP/OBS SF/LOW 25: CPT | Performed by: NURSE PRACTITIONER

## 2024-06-11 RX ORDER — OXYCODONE HYDROCHLORIDE 15 MG/1
15 TABLET ORAL
Status: DISCONTINUED | OUTPATIENT
Start: 2024-06-11 | End: 2024-06-15

## 2024-06-11 RX ORDER — OXYCODONE HYDROCHLORIDE 10 MG/1
10 TABLET ORAL
Status: DISCONTINUED | OUTPATIENT
Start: 2024-06-11 | End: 2024-06-15

## 2024-06-11 RX ORDER — HYDROMORPHONE HYDROCHLORIDE 1 MG/ML
0.5 INJECTION, SOLUTION INTRAMUSCULAR; INTRAVENOUS; SUBCUTANEOUS EVERY 6 HOURS PRN
Status: DISCONTINUED | OUTPATIENT
Start: 2024-06-11 | End: 2024-06-11

## 2024-06-11 RX ADMIN — OXYCODONE HYDROCHLORIDE 15 MG: 15 TABLET ORAL at 21:05

## 2024-06-11 RX ADMIN — OXYCODONE HYDROCHLORIDE 15 MG: 15 TABLET ORAL at 13:58

## 2024-06-11 RX ADMIN — METAXALONE 800 MG: 800 TABLET ORAL at 04:20

## 2024-06-11 RX ADMIN — GABAPENTIN 300 MG: 300 CAPSULE ORAL at 04:20

## 2024-06-11 RX ADMIN — DULOXETINE HYDROCHLORIDE 30 MG: 30 CAPSULE, DELAYED RELEASE ORAL at 04:20

## 2024-06-11 RX ADMIN — HYDROMORPHONE HYDROCHLORIDE 0.5 MG: 1 INJECTION, SOLUTION INTRAMUSCULAR; INTRAVENOUS; SUBCUTANEOUS at 10:41

## 2024-06-11 RX ADMIN — ENOXAPARIN SODIUM 40 MG: 100 INJECTION SUBCUTANEOUS at 17:59

## 2024-06-11 RX ADMIN — HYDROMORPHONE HYDROCHLORIDE 0.5 MG: 1 INJECTION, SOLUTION INTRAMUSCULAR; INTRAVENOUS; SUBCUTANEOUS at 05:12

## 2024-06-11 RX ADMIN — DOCUSATE SODIUM 100 MG: 100 CAPSULE, LIQUID FILLED ORAL at 17:59

## 2024-06-11 RX ADMIN — OXYCODONE HYDROCHLORIDE 15 MG: 15 TABLET ORAL at 09:32

## 2024-06-11 RX ADMIN — ENOXAPARIN SODIUM 40 MG: 100 INJECTION SUBCUTANEOUS at 04:21

## 2024-06-11 RX ADMIN — GABAPENTIN 300 MG: 300 CAPSULE ORAL at 21:02

## 2024-06-11 RX ADMIN — OXYCODONE HYDROCHLORIDE 15 MG: 15 TABLET ORAL at 03:57

## 2024-06-11 RX ADMIN — METAXALONE 800 MG: 800 TABLET ORAL at 17:59

## 2024-06-11 RX ADMIN — OXYCODONE HYDROCHLORIDE 15 MG: 15 TABLET ORAL at 17:59

## 2024-06-11 RX ADMIN — GABAPENTIN 300 MG: 300 CAPSULE ORAL at 13:58

## 2024-06-11 ASSESSMENT — COGNITIVE AND FUNCTIONAL STATUS - GENERAL
STANDING UP FROM CHAIR USING ARMS: TOTAL
SUGGESTED CMS G CODE MODIFIER DAILY ACTIVITY: CK
MOBILITY SCORE: 12
TURNING FROM BACK TO SIDE WHILE IN FLAT BAD: A LITTLE
MOVING TO AND FROM BED TO CHAIR: A LITTLE
DRESSING REGULAR LOWER BODY CLOTHING: A LOT
CLIMB 3 TO 5 STEPS WITH RAILING: TOTAL
MOVING FROM LYING ON BACK TO SITTING ON SIDE OF FLAT BED: A LITTLE
WALKING IN HOSPITAL ROOM: TOTAL
SUGGESTED CMS G CODE MODIFIER MOBILITY: CL
HELP NEEDED FOR BATHING: A LOT
TOILETING: A LITTLE
DAILY ACTIVITIY SCORE: 19

## 2024-06-11 ASSESSMENT — GAIT ASSESSMENTS: GAIT LEVEL OF ASSIST: UNABLE TO PARTICIPATE

## 2024-06-11 ASSESSMENT — PAIN DESCRIPTION - PAIN TYPE
TYPE: SURGICAL PAIN

## 2024-06-11 ASSESSMENT — ENCOUNTER SYMPTOMS
GASTROINTESTINAL NEGATIVE: 1
CARDIOVASCULAR NEGATIVE: 1

## 2024-06-11 NOTE — ASSESSMENT & PLAN NOTE
Fracture of the fourth metacarpal neck. Fracture of the fifth metacarpal neck with anterior angulation and displacement distal fracture left hand.  5/30 Closed treatment with manipulation, left fourth and fifth metacarpal fractures and application of short arm splint.  Weight bearing status - Weightbearing as tolerated through the left forearm for transfer training and mobilization with therapy.  J Carlos Pollard MD. Orthopedic Surgeon. [facility].   No

## 2024-06-11 NOTE — THERAPY
"Occupational Therapy  Daily Treatment     Patient Name: Hayden Manzanares II  Age:  34 y.o., Sex:  male  Medical Record #: [id number]  Today's Date: 6/11/2024     Precautions: Fall Risk, Non Weight Bearing Right Lower Extremity, Non Weight Bearing Left Lower Extremity, CAM Boot, Immobilizer Right Lower Extremity, Platform Weight Bearing Left Upper Extremity, Weight Bearing As Tolerated Right Upper Extremity  Comments: hinged knee brace set at 20 degrees; R CAM boot; L foot splint    Assessment    Pt seen for OT tx to include: bed mobility, transfers, UB dressing, therapeutic seated shower. See grid below for details. Pt required max A for bathing task due to WB & ROM restrictions. Ongoing education on DC plan. Pt reports sister and dad arrived from [place] and will come to hospital this afternoon. Pt is near functional plateau from OT standpoint within current restrictions. He will need incidental assist while functioning at  level. Will continue to provide acute OT including CG training.     Plan    Treatment Plan Status: Continue Current Treatment Plan  Type of Treatment: Self Care / Activities of Daily Living, Adaptive Equipment, Therapeutic Exercises, Therapeutic Activity  Treatment Frequency: 3 Times per Week  Treatment Duration: Until Therapy Goals Met    DC Equipment Recommendations: Bed Side Commode (drop-arm commode)  Discharge Recommendations: Recommend outpatient occupational therapy services to address higher level deficits (once pt cleared by orthopedics for UB ROM/strengthening, unrestricted functional use). Provided family can provide necessary support given current restrictions. Pt plans to travel to [place] with sister and father via [employer].     Subjective    \"I'd rather just scoot over.\" (versus slide board)     Objective       06/11/24 1144   Treatment Charges   Charges Yes   OT Self Care / ADL (Units) 3   OT Therapy Activity (Units) 1   Total Time Spent   OT Time Spent Yes   OT Self Care / ADL (Minutes) 44 "   OT Therapy Activity (Minutes) 15   OT Total Time Spent (Calculated) 59    Services   Is patient using  services for this encounter? No   Precautions   Precautions Fall Risk;Non Weight Bearing Right Lower Extremity;Non Weight Bearing Left Lower Extremity;CAM Boot;Immobilizer Right Lower Extremity;Platform Weight Bearing Left Upper Extremity;Weight Bearing As Tolerated Right Upper Extremity   Comments hinged knee brace 20 set at 20 degrees; R CAM boot; L foot splint   Vitals   O2 (LPM) 0   O2 Delivery Device None - Room Air   Pain   Pain Scales 0 to 10 Scale    Pain 0 - 10 Group   Location Foot   Location Orientation Left   Description Burning   Therapist Pain Assessment Post Activity Pain Same as Prior to Activity;Nurse Notified  (reports L foot pain improved since pain meds; not rated, but agreeable to activity)   Non Verbal Descriptors   Non Verbal Scale  Calm;Unlabored Breathing   Cognition    Cognition / Consciousness WDL   Level of Consciousness Alert   Comments cooperative, flat affect   Active ROM Upper Body   Active ROM Upper Body  X   Dominant Hand Right   Comments LUE in short arm splint with thumb only exposed; R shoulder limited by pain but improving (~85 degrees flexion)   Other Treatments   Other Treatments Provided ongoing training on mangement of LB orthoses; education on shower alternatives due to sister's shower inaccessible by WC   Balance   Sitting Balance (Static) Good   Sitting Balance (Dynamic) Fair +   Weight Shift Sitting Good   Skilled Intervention Verbal Cuing;Postural Facilitation   Bed Mobility    Supine to Sit Supervised   Sit to Supine Supervised   Scooting Supervised  (seated)   Rolling Supervised   Skilled Intervention Compensatory Strategies;Postural Facilitation;Verbal Cuing   Comments flat bed, no rail   Activities of Daily Living   Grooming Minimal Assist;Seated;Supervision  (hair combing with R hand)   Bathing Maximal Assist  (seated shower on 3:1 commode)    Upper Body Dressing Supervision  (gown donning)   Lower Body Dressing   (NT; declined pants after shower)   Skilled Intervention Compensatory Strategies;Verbal Cuing   How much help from another person does the patient currently need...   Putting on and taking off regular lower body clothing? 2   Bathing (including washing, rinsing, and drying)? 2   Toileting, which includes using a toilet, bedpan, or urinal? 3   Putting on and taking off regular upper body clothing? 4   Taking care of personal grooming such as brushing teeth? 4   Eating meals? 4   6 Clicks Daily Activity Score 19   Functional Mobility   Sit to Stand Unable to Participate   Bed, Chair, Wheelchair Transfer Standby Assist   Tub / Shower Transfers Contact Guard Assist  (to/from drop-arm commode bare  bottom)   Transfer Method Sit Pivot;Slide Board  (slide board to commode, sit scoot back to WC)   Mobility WC > < BSC, WC > EOB > supine   Skilled Intervention Compensatory Strategies;Postural Facilitation;Verbal Cuing   Activity Tolerance   Sitting in Chair >50 min total   Sitting Edge of Bed 1 min   Patient / Family Goals   Patient / Family Goal #1 To go home   Short Term Goals   Short Term Goal # 1 Pt will complete ADL txfs with supv   Goal Outcome # 1 Progressing as expected   Short Term Goal # 2 Pt will complete LB dressing with supv using AE PRN   Goal Outcome # 2   (NT this session)   Short Term Goal # 3 Pt will complete toileting ADLs with CGA   Goal Outcome # 3   (NT this session)   Short Term Goal # 4 Pt will complete seated shower with min A  (6/11 new goal)   Goal Outcome # 4 Progressing as expected   Short Term Goal # 5 Pt's family will be independent to manage LE orthoses  (6/11 new goal)   Education Group   Education Provided Activities of Daily Living;Weight Bearing Precautions;Transfers   Transfers Patient Response Patient;Acceptance;Explanation;Demonstration;Verbal Demonstration;Action Demonstration;Reinforcement Needed   ADL Patient  Response Patient;Acceptance;Explanation;Demonstration;Verbal Demonstration;Action Demonstration;Reinforcement Needed  (seated bathing)   Weight Bearing Precautions Patient Response Reinforcement Needed;Verbal Demonstration;Patient;Acceptance;Explanation;Demonstration  (NWB L hand)   Occupational Therapy Treatment Plan    O.T. Treatment Plan Continue Current Treatment Plan   Anticipated Discharge Equipment and Recommendations   DC Equipment Recommendations Bed Side Commode  (drop-arm commode)   Discharge Recommendations Recommend outpatient occupational therapy services to address higher level deficits  (Once pt cleared by orthopedics for UB ROM/strengthening, unrestricted functional use)   Interdisciplinary Plan of Care Collaboration   IDT Collaboration with  Nursing   Patient Position at End of Therapy In Bed;Call Light within Reach;Tray Table within Reach;Phone within Reach   Collaboration Comments OT results and recs   Session Information   Date / Session Number  6/11 #5 (1/3, 6/16)
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
## 2024-06-11 NOTE — THERAPY
"Physical Therapy   Daily Treatment     Patient Name: Hayden Manzanares II  Age:  34 y.o., Sex:  male  Medical Record #: [id number]  Today's Date: 6/11/2024     Precautions  Precautions: Fall Risk;Non Weight Bearing Right Lower Extremity;Non Weight Bearing Left Lower Extremity;Platform Weight Bearing Left Upper Extremity;CAM Boot;Weight Bearing As Tolerated Right Upper Extremity  Comments: HKB at 20 deg R LE, B CAM boots    Assessment    Pt greeted and seen for PT treatment. Pt is able to perform bed mobility and lateral scoot transfers to/from  w/o assist. Slide board was introduced and practiced today for car/long transfers, pt needed assist to place and remove slide board but was able to perform transfer w/ SBA. Pt currently limited by impaired balance 2/2 WB status which negatively impacts functional mobility. Pt will continue to benefit from skilled PT to address deficits.       Plan    Treatment Plan Status: Continue Current Treatment Plan  Type of Treatment: Bed Mobility, Equipment, Family / Caregiver Training, Neuro Re-Education / Balance, Self Care / Home Evaluation, Therapeutic Activities, Therapeutic Exercise  Treatment Frequency: 4 Times per Week  Treatment Duration: Until Therapy Goals Met    DC Equipment Recommendations:  (18\" wheelchair with elevating leg rests and removable arm rests and slideboard)  Discharge Recommendations: Recommend outpatient physical therapy services to address higher level deficits (Pt to DC to parents home in [place]. OPPT when pt is cleared for WB by surgeon.)       06/11/24 1054   Cognition    Comments flat affect, cooperative   Balance   Sitting Balance (Static) Good   Sitting Balance (Dynamic) Fair +   Weight Shift Sitting Fair   Skilled Intervention Verbal Cuing;Sequencing   Comments lateral scoot and slide board   Bed Mobility    Supine to Sit Supervised   Sit to Supine Supervised   Scooting Supervised   Rolling Supervised   Comments flat HOB   Gait Analysis   Gait Level Of " Assist Unable to Participate   Functional Mobility   Sit to Stand Unable to Participate   Bed, Chair, Wheelchair Transfer Standby Assist   Transfer Method Sit Pivot;Slide Board   Mobility bed>chair via scooting, chair<>bed via slide board   Skilled Intervention Verbal Cuing;Sequencing;Compensatory Strategies   Comments introduced slide board to use during car/long transfers. Pt will need help with slideboard placement, will arrange for next session to be with family present.   Short Term Goals    Short Term Goal # 1 Pt will perform bed mobility from a flat bed with supervision to progress to home in 6 visits.   Goal Outcome # 1 Goal met   Short Term Goal # 2 Pt will transfer via slide board to the wheelchair with supervision to progress independence in 6 visits.   Goal Outcome # 2 Progressing as expected   Short Term Goal # 3 Pt will propel manual wheelchair 100ft with supervision for short household distances in 6 visits.   Goal Outcome # 3 Progressing as expected
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
## 2024-06-12 LAB
ANION GAP SERPL CALC-SCNC: 16 MMOL/L (ref 7–16)
ANION GAP SERPL CALC-SCNC: 16 MMOL/L (ref 7–16)
BASOPHILS # BLD AUTO: 0.3 % (ref 0–1.8)
BASOPHILS # BLD AUTO: 0.3 % (ref 0–1.8)
BASOPHILS # BLD: 0.02 K/UL (ref 0–0.12)
BASOPHILS # BLD: 0.02 K/UL (ref 0–0.12)
BUN SERPL-MCNC: 15 MG/DL (ref 8–22)
BUN SERPL-MCNC: 15 MG/DL (ref 8–22)
CALCIUM SERPL-MCNC: 9.4 MG/DL (ref 8.5–10.5)
CALCIUM SERPL-MCNC: 9.4 MG/DL (ref 8.5–10.5)
CHLORIDE SERPL-SCNC: 105 MMOL/L (ref 96–112)
CHLORIDE SERPL-SCNC: 105 MMOL/L (ref 96–112)
CO2 SERPL-SCNC: 20 MMOL/L (ref 20–33)
CO2 SERPL-SCNC: 20 MMOL/L (ref 20–33)
CREAT SERPL-MCNC: 0.92 MG/DL (ref 0.5–1.4)
CREAT SERPL-MCNC: 0.92 MG/DL (ref 0.5–1.4)
EOSINOPHIL # BLD AUTO: 0.15 K/UL (ref 0–0.51)
EOSINOPHIL # BLD AUTO: 0.15 K/UL (ref 0–0.51)
EOSINOPHIL NFR BLD: 2.5 % (ref 0–6.9)
EOSINOPHIL NFR BLD: 2.5 % (ref 0–6.9)
ERYTHROCYTE [DISTWIDTH] IN BLOOD BY AUTOMATED COUNT: 48.2 FL (ref 35.9–50)
ERYTHROCYTE [DISTWIDTH] IN BLOOD BY AUTOMATED COUNT: 48.2 FL (ref 35.9–50)
GFR SERPLBLD CREATININE-BSD FMLA CKD-EPI: 113 ML/MIN/1.73 M 2
GFR SERPLBLD CREATININE-BSD FMLA CKD-EPI: 113 ML/MIN/1.73 M 2
GLUCOSE SERPL-MCNC: 144 MG/DL (ref 65–99)
GLUCOSE SERPL-MCNC: 144 MG/DL (ref 65–99)
HCT VFR BLD AUTO: 37.8 % (ref 42–52)
HCT VFR BLD AUTO: 37.8 % (ref 42–52)
HGB BLD-MCNC: 12.2 G/DL (ref 14–18)
HGB BLD-MCNC: 12.2 G/DL (ref 14–18)
IMM GRANULOCYTES # BLD AUTO: 0.04 K/UL (ref 0–0.11)
IMM GRANULOCYTES # BLD AUTO: 0.04 K/UL (ref 0–0.11)
IMM GRANULOCYTES NFR BLD AUTO: 0.7 % (ref 0–0.9)
IMM GRANULOCYTES NFR BLD AUTO: 0.7 % (ref 0–0.9)
LYMPHOCYTES # BLD AUTO: 2.06 K/UL (ref 1–4.8)
LYMPHOCYTES # BLD AUTO: 2.06 K/UL (ref 1–4.8)
LYMPHOCYTES NFR BLD: 34.3 % (ref 22–41)
LYMPHOCYTES NFR BLD: 34.3 % (ref 22–41)
MCH RBC QN AUTO: 29 PG (ref 27–33)
MCH RBC QN AUTO: 29 PG (ref 27–33)
MCHC RBC AUTO-ENTMCNC: 32.3 G/DL (ref 32.3–36.5)
MCHC RBC AUTO-ENTMCNC: 32.3 G/DL (ref 32.3–36.5)
MCV RBC AUTO: 89.8 FL (ref 81.4–97.8)
MCV RBC AUTO: 89.8 FL (ref 81.4–97.8)
MONOCYTES # BLD AUTO: 0.59 K/UL (ref 0–0.85)
MONOCYTES # BLD AUTO: 0.59 K/UL (ref 0–0.85)
MONOCYTES NFR BLD AUTO: 9.8 % (ref 0–13.4)
MONOCYTES NFR BLD AUTO: 9.8 % (ref 0–13.4)
NEUTROPHILS # BLD AUTO: 3.15 K/UL (ref 1.82–7.42)
NEUTROPHILS # BLD AUTO: 3.15 K/UL (ref 1.82–7.42)
NEUTROPHILS NFR BLD: 52.4 % (ref 44–72)
NEUTROPHILS NFR BLD: 52.4 % (ref 44–72)
NRBC # BLD AUTO: 0 K/UL
NRBC # BLD AUTO: 0 K/UL
NRBC BLD-RTO: 0 /100 WBC (ref 0–0.2)
NRBC BLD-RTO: 0 /100 WBC (ref 0–0.2)
PLATELET # BLD AUTO: 437 K/UL (ref 164–446)
PLATELET # BLD AUTO: 437 K/UL (ref 164–446)
PMV BLD AUTO: 9.6 FL (ref 9–12.9)
PMV BLD AUTO: 9.6 FL (ref 9–12.9)
POTASSIUM SERPL-SCNC: 4 MMOL/L (ref 3.6–5.5)
POTASSIUM SERPL-SCNC: 4 MMOL/L (ref 3.6–5.5)
RBC # BLD AUTO: 4.21 M/UL (ref 4.7–6.1)
RBC # BLD AUTO: 4.21 M/UL (ref 4.7–6.1)
SODIUM SERPL-SCNC: 141 MMOL/L (ref 135–145)
SODIUM SERPL-SCNC: 141 MMOL/L (ref 135–145)
WBC # BLD AUTO: 6 K/UL (ref 4.8–10.8)
WBC # BLD AUTO: 6 K/UL (ref 4.8–10.8)

## 2024-06-12 PROCEDURE — A9270 NON-COVERED ITEM OR SERVICE: HCPCS | Performed by: NURSE PRACTITIONER

## 2024-06-12 PROCEDURE — 97530 THERAPEUTIC ACTIVITIES: CPT | Mod: CQ

## 2024-06-12 PROCEDURE — 700102 HCHG RX REV CODE 250 W/ 637 OVERRIDE(OP): Performed by: NURSE PRACTITIONER

## 2024-06-12 PROCEDURE — 80048 BASIC METABOLIC PNL TOTAL CA: CPT

## 2024-06-12 PROCEDURE — 85025 COMPLETE CBC W/AUTO DIFF WBC: CPT

## 2024-06-12 PROCEDURE — 99232 SBSQ HOSP IP/OBS MODERATE 35: CPT

## 2024-06-12 PROCEDURE — 700102 HCHG RX REV CODE 250 W/ 637 OVERRIDE(OP)

## 2024-06-12 PROCEDURE — 36415 COLL VENOUS BLD VENIPUNCTURE: CPT

## 2024-06-12 PROCEDURE — A9270 NON-COVERED ITEM OR SERVICE: HCPCS

## 2024-06-12 PROCEDURE — 700111 HCHG RX REV CODE 636 W/ 250 OVERRIDE (IP): Mod: JZ | Performed by: PHYSICIAN ASSISTANT

## 2024-06-12 PROCEDURE — 770001 HCHG ROOM/CARE - MED/SURG/GYN PRIV*

## 2024-06-12 RX ORDER — OXYCODONE HYDROCHLORIDE 10 MG/1
10 TABLET ORAL EVERY 4 HOURS PRN
Qty: 28 TABLET | Refills: 0 | Status: SHIPPED | OUTPATIENT
Start: 2024-06-12 | End: 2024-06-17

## 2024-06-12 RX ORDER — ASPIRIN 81 MG/1
81 TABLET ORAL 2 TIMES DAILY
COMMUNITY
Start: 2024-06-12

## 2024-06-12 RX ORDER — GABAPENTIN 300 MG/1
300 CAPSULE ORAL EVERY 8 HOURS
Qty: 21 CAPSULE | Refills: 0 | Status: SHIPPED | OUTPATIENT
Start: 2024-06-12 | End: 2024-06-19

## 2024-06-12 RX ORDER — METHOCARBAMOL 750 MG/1
750 TABLET, FILM COATED ORAL 4 TIMES DAILY
Qty: 120 TABLET | Refills: 0 | Status: SHIPPED | OUTPATIENT
Start: 2024-06-12

## 2024-06-12 RX ORDER — ACETAMINOPHEN 500 MG
1000 TABLET ORAL EVERY 6 HOURS PRN
COMMUNITY
Start: 2024-06-12

## 2024-06-12 RX ORDER — AMOXICILLIN 250 MG
1 CAPSULE ORAL DAILY
COMMUNITY
Start: 2024-06-12

## 2024-06-12 RX ADMIN — ENOXAPARIN SODIUM 40 MG: 100 INJECTION SUBCUTANEOUS at 04:52

## 2024-06-12 RX ADMIN — OXYCODONE HYDROCHLORIDE 15 MG: 15 TABLET ORAL at 07:58

## 2024-06-12 RX ADMIN — OXYCODONE HYDROCHLORIDE 15 MG: 15 TABLET ORAL at 19:45

## 2024-06-12 RX ADMIN — METAXALONE 800 MG: 800 TABLET ORAL at 12:49

## 2024-06-12 RX ADMIN — SENNOSIDES AND DOCUSATE SODIUM 1 TABLET: 50; 8.6 TABLET ORAL at 20:03

## 2024-06-12 RX ADMIN — ENOXAPARIN SODIUM 40 MG: 100 INJECTION SUBCUTANEOUS at 16:18

## 2024-06-12 RX ADMIN — METAXALONE 800 MG: 800 TABLET ORAL at 04:52

## 2024-06-12 RX ADMIN — OXYCODONE HYDROCHLORIDE 15 MG: 15 TABLET ORAL at 12:48

## 2024-06-12 RX ADMIN — METAXALONE 800 MG: 800 TABLET ORAL at 16:18

## 2024-06-12 RX ADMIN — OXYCODONE HYDROCHLORIDE 15 MG: 15 TABLET ORAL at 04:51

## 2024-06-12 RX ADMIN — OXYCODONE HYDROCHLORIDE 15 MG: 15 TABLET ORAL at 00:06

## 2024-06-12 RX ADMIN — OXYCODONE HYDROCHLORIDE 15 MG: 15 TABLET ORAL at 15:58

## 2024-06-12 RX ADMIN — DOCUSATE SODIUM 100 MG: 100 CAPSULE, LIQUID FILLED ORAL at 04:54

## 2024-06-12 RX ADMIN — GABAPENTIN 300 MG: 300 CAPSULE ORAL at 12:59

## 2024-06-12 RX ADMIN — OXYCODONE HYDROCHLORIDE 15 MG: 15 TABLET ORAL at 23:44

## 2024-06-12 RX ADMIN — GABAPENTIN 300 MG: 300 CAPSULE ORAL at 04:52

## 2024-06-12 RX ADMIN — DULOXETINE HYDROCHLORIDE 30 MG: 30 CAPSULE, DELAYED RELEASE ORAL at 04:52

## 2024-06-12 RX ADMIN — GABAPENTIN 300 MG: 300 CAPSULE ORAL at 21:42

## 2024-06-12 ASSESSMENT — PAIN DESCRIPTION - PAIN TYPE
TYPE: SURGICAL PAIN
TYPE: ACUTE PAIN;SURGICAL PAIN
TYPE: SURGICAL PAIN
TYPE: SURGICAL PAIN
TYPE: ACUTE PAIN;SURGICAL PAIN
TYPE: SURGICAL PAIN
TYPE: SURGICAL PAIN
TYPE: ACUTE PAIN;SURGICAL PAIN
TYPE: SURGICAL PAIN
TYPE: SURGICAL PAIN

## 2024-06-12 ASSESSMENT — COGNITIVE AND FUNCTIONAL STATUS - GENERAL
MOBILITY SCORE: 12
SUGGESTED CMS G CODE MODIFIER MOBILITY: CL
WALKING IN HOSPITAL ROOM: TOTAL
MOVING FROM LYING ON BACK TO SITTING ON SIDE OF FLAT BED: A LITTLE
MOVING TO AND FROM BED TO CHAIR: A LITTLE
STANDING UP FROM CHAIR USING ARMS: TOTAL
TURNING FROM BACK TO SIDE WHILE IN FLAT BAD: A LITTLE
CLIMB 3 TO 5 STEPS WITH RAILING: TOTAL

## 2024-06-12 ASSESSMENT — GAIT ASSESSMENTS: GAIT LEVEL OF ASSIST: UNABLE TO PARTICIPATE
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
## 2024-06-12 NOTE — DISCHARGE PLANNING
0828  Agency/Facility Name: [facility]  Spoke To: Jacqueline  Outcome: DPA called regarding updates. Pt is still declined from facility.

## 2024-06-12 NOTE — CARE PLAN
Problem: Pain - Standard  Goal: Alleviation of pain or a reduction in pain to the patient’s comfort goal  Outcome: Progressing     Problem: Communication  Goal: The ability to communicate needs accurately and effectively will improve  Outcome: Progressing     Problem: Discharge Barriers/Planning  Goal: Patient's continuum of care needs are met  Outcome: Progressing       The patient is Stable - Low risk of patient condition declining or worsening    Shift Goals  Clinical Goals: Pain control, discharge planning  Patient Goals: Pain control, discharge planning  Family Goals: n/a    Progress made toward(s) clinical / shift goals:  Taught pt 0-10 pain scale and non-pharmacological method of pain management, encouraged to verbalize when in pain. Administered PRN pain meds as needed. Discharge barriers brought up to interdisciplinary team.     Patient is not progressing towards the following goals:

## 2024-06-12 NOTE — PROGRESS NOTES
"      Orthopaedic Progress Note    Interval changes:  Patient doing well    LLE dressings CDI  Cleared for DC by ortho pending trauma clearance    ROS - Patient denies any new issues.  Pain well controlled.    /76   Pulse 95   Temp 36.8 °C (98.2 °F) (Temporal)   Resp 17   Ht 1.753 m (5' 9.02\")   Wt 77.1 kg (170 lb)   SpO2 95%     Patient seen and examined  No acute distress  Breathing non labored  RRR  RLE hinged knee brace in place, in cam boot, DNVI, moves all toes, cap refill <2 sec. LLE in short leg splint CDI, DNVI, moves all toes, cap refil <2 sec     Active Hospital Problems    Diagnosis     Discharge planning issues [Z75.8]      Priority: High    Multiple fractures of left foot, closed, initial encounter [S92.902A]      Priority: High    Closed fracture of right tibial plateau [S82.141A]      Priority: Medium    Fracture of rib of left side [S22.32XA]      Priority: Medium    Closed left ankle fracture [S82.892A]      Priority: Medium    Sacral fracture, closed (HCC) [S32.10XA]      Priority: Medium    Multiple lacerations [T07.XXXA]      Priority: Medium    Multiple closed fractures of metacarpal bones [S62.309A]      Priority: Medium    Closed fracture of right scapula [S42.101A]      Priority: Medium    Closed fracture of right ankle [S82.891A]      Priority: Medium    Evaluation by psychiatric service required [Z00.8]      Priority: Low    Trauma [T14.90XA]      Priority: Low    No contraindication to deep vein thrombosis (DVT) prophylaxis [Z78.9]      Priority: Low    Compression fracture of T8 vertebra (HCC) [S22.060A]      Priority: Low       Assessment/Plan:  Patient doing well    LLE splint dressings CDI  Cleared for DC by ortho pending trauma clearance  POD#6 S/P:  1.  Open treatment and internal fixation of left first metatarsal fracture.  2.  Open treatment and internal fixation of left first tarsometatarsal joint.  POD#13 S/P Closed treatment with manipulation, left fourth and fifth " metacarpal fractures and application of short arm splint.  POD#19 S/P:  1.  Open treatment and internal fixation of left bimalleolar ankle fracture.  2.  Open treatment and internal fixation of left distal tibiofibular joint disruption.  3.  Excisional debridement of left leg laceration measuring 5x1.5 cm including skin and subcutaneous tissue.  4.  Layered repair of intermediate complexity laceration measuring 5 cm, left leg.   5. Closed treatment without manipulation of right talar dome fracture  6. Closed treatment without manipulation of right calcaneus fracture  7. Closed treatment without manipulation of right tibia plateau/eminence fracture  8. Closed treatment without manipulation of right lateral malleolus fracture  9. Closed treatment without manipulation of left sacrum and anterior pelvis fractures  Wt bearing status - NWB BLE  Wound care/Drains - Dressings to be changed every other day by nursing. Or PRN for saturation starting POD#2  Future Procedures - none planned   Lovenox: Start 5/23, Duration-until ambulatory > 150'  Sutures/Staples out- 14-21 days post operatively. Removal will completed by ortho mid levels only.  PT/OT-initiated  Antibiotics: Perioperative completed  DVT Prophylaxis- TEDS/SCDs/Foot pumps  Campos-not needed per ortho  Case Coordination for Discharge Planning - Disposition per therapy recs.

## 2024-06-12 NOTE — DISCHARGE PLANNING
0943  Referral sent to [facility] per choice form.     1207  Referral sent to [place] outlying area SNFs.     1258  Referral sent to [facility].

## 2024-06-12 NOTE — DISCHARGE PLANNING
1451  Agency/Facility Name: [facility]  Spoke To: Sonido  Outcome: DPA called to follow up on pending referral. They are out of network and there are no bed available.

## 2024-06-12 NOTE — CARE PLAN
The patient is Stable - Low risk of patient condition declining or worsening    Shift Goals  Clinical Goals: pain control, mobility  Patient Goals: pain control, rest  Family Goals: n/a    Progress made toward(s) clinical / shift goals:    Problem: Skin Integrity  Goal: Skin integrity is maintained or improved  Outcome: Progressing     Problem: Pain - Standard  Goal: Alleviation of pain or a reduction in pain to the patient’s comfort goal  Outcome: Progressing       Patient is not progressing towards the following goals:

## 2024-06-12 NOTE — THERAPY
"Physical Therapy   Daily Treatment     Patient Name: Hayden Maznanares II  Age:  34 y.o., Sex:  male  Medical Record #: [id number]  Today's Date: 6/12/2024     Precautions  Precautions: Fall Risk;Non Weight Bearing Right Lower Extremity;Non Weight Bearing Left Lower Extremity;CAM Boot;Immobilizer Right Lower Extremity;Platform Weight Bearing Left Upper Extremity;Weight Bearing As Tolerated Right Upper Extremity  Comments: hinged knee brace 20 set at 20 degrees; R CAM boot; L foot splint    Assessment    Pt greeted and seen for PT treatment. Pt's father and sister present for session today. Family reported that they are currently unable to provide physical assist due to father and mother both having lifting restrictions at the moment due to recent surgeries. Pt would likely need Lupis for transfer in/out of car and assist to bump WC up the stairs to enter home which are current barriers to DC home since family cannot provide this assist at this time. Pt's dad confirmed pt is welcome into the family home and ability to provide assist with \"bed baths and diapers\", etc. Today pt performed bed mobility w/o assist and lateral transfers bed<>WC w/ SBA, pt given VC for technique and demo'd limited willingness to implement cues. Pt is near functional plateau from PT standpoint within current restrictions. He will need incidental assist while functioning at WC level.   Pt currently limited by weight bearing precautions which negatively impacts functional mobility. Pt will continue to benefit from skilled PT to address deficits.       Plan    Treatment Plan Status: Continue Current Treatment Plan  Type of Treatment: Bed Mobility, Equipment, Family / Caregiver Training, Neuro Re-Education / Balance, Self Care / Home Evaluation, Therapeutic Activities, Therapeutic Exercise  Treatment Frequency: 4 Times per Week  Treatment Duration: Until Therapy Goals Met    DC Equipment Recommendations:  (WC has been delivered.)  Discharge " Recommendations: Recommend home health for continued physical therapy services once pt has been cleared to progress weight bearing.        06/12/24 1258   Pain 0 - 10 Group   Therapist Pain Assessment Post Activity Pain Same as Prior to Activity;Nurse Notified  (no c/o pain throughout session)   Cognition    Comments cooperative, flat affect   Balance   Sitting Balance (Static) Good   Sitting Balance (Dynamic) Fair +   Weight Shift Sitting Good   Skilled Intervention Verbal Cuing;Sequencing   Comments lateral scoot   Bed Mobility    Supine to Sit Supervised   Sit to Supine Supervised   Scooting Supervised   Rolling Supervised   Comments flat HOB, no use of rails   Gait Analysis   Gait Level Of Assist Unable to Participate   Functional Mobility   Sit to Stand Unable to Participate   Bed, Chair, Wheelchair Transfer Standby Assist   Transfer Method Sit Pivot   Mobility bed<>WC, propulsion in hallway   Wheelchair Assist Supervised   Distance Wheelchair (Feet or Distance) 80   Skilled Intervention Verbal Cuing;Sequencing   Comments VC to perform transfer w/ B LE resting on floor vs keeping them up on the bed, somewhat compliant. Pt cued to attempt maintaining sitting until transfer is complete, however pt rolls into bed half way through trasnfer. Pt w/ limited receptiveness to VC.   Short Term Goals    Short Term Goal # 1 Pt will perform bed mobility from a flat bed with supervision to progress to home in 6 visits.   Goal Outcome # 1 Goal met   Short Term Goal # 2 Pt will transfer via slide board to the wheelchair with supervision to progress independence in 6 visits.   Goal Outcome # 2 Progressing as expected   Short Term Goal # 3 Pt will propel manual wheelchair 100ft with supervision for short household distances in 6 visits.   Goal Outcome # 3 Goal met   Supervising Physical Therapist (PTA Treatments Only)   Supervising Physical Therapist Fabiana Felix

## 2024-06-12 NOTE — DISCHARGE PLANNING
Case Management Discharge Planning    Admission Date: 5/23/2024  GMLOS: 5.6  ALOS: 20    6-Clicks ADL Score: 19  6-Clicks Mobility Score: 12    Anticipated Discharge Dispo: Discharge Disposition: Discharged to home/self care (01)    DME Needed: Yes    DME Ordered: Yes    Action(s) Taken: LMSW completed chart review, pt does not have any accepting facilities at this time. LMSW requested DPA to FU with [facility], who have declined at this time. Pt was discussed during morning rounds, PT/OT has re-evaluated pt and stated that he is progressing well to DC home with family support and w/c. LMSW requested DME w/c order from provider. LMSW met with pt at bedside, pt made choice for DME, 1) [facility] and provided verbal consent. Pt stated that his parents are in [place] as of yesterday and planning to travel home to [place] tomorrow.    UPDATE 1045  LMSW received phone call from [facility] stating they received order and can deliver w/c by EOD today. LMSW notified provider and bedside RN. Pt was discussed during IDT rounds, pt is medically cleared and set to DC home with family support once w/c has arrived at bedside.    UPDATE 1230  LMSW met with pt and pt's family at bedside to discuss DC home. Pt's w/c has been delivered at bedside. Pt's father stated that he recently had eye surgery, his wife had rotator cuff surgery, both on lifting restrictions and are unable to provide physical support to assist with pt entering home. LMSW spoke with PT, who stated that pt's barriers to DC include car transfer, steps to enter the house and having [place] medicaid while living in [place]. LMSW notified provider, bedside RN, and escalated to leadership.    UPDATE 1330  LMSW met with family at bedside to discuss next steps. Pt's family stated that they are no longer agreeable to pt DC to their home in [place] because they do not want pt to lose [place] medicaid benefits. LMSW encouraged pt to apply for medicaid in [place] however, pt's sister stated that she is disabled,  attempted to apply for medicaid in [place] twice and was declined both times. Pt needs to maintain [place] medicaid benefits to continue PT and was previously living in [place]. Pt is still on non-weight bearing restrictions and would benefit from post-acute placement. LMSW informed provider and leadership, submitted complex CM order.    Escalations Completed: Provider, Bedside RN, and Leadership    Medically Clear: Yes    Next Steps: LMSW to follow for any additional CM needs.    Barriers to Discharge: Pending Placement    Is the patient up for discharge tomorrow: No

## 2024-06-12 NOTE — FACE TO FACE
Face to Face Note  -  Durable Medical Equipment    Camille Abdi D.N.P. - NPI: [id number]  I certify that this patient is under my care and that they had a durable medical equipment(DME)face to face encounter by myself that meets the physician DME face-to-face encounter requirements with this patient on:    Date of encounter:   Patient:                    MRN:                       YOB: 2023  Hayden Manzanares II  [id number]  1990     The encounter with the patient was in whole, or in part, for the following medical condition, which is the primary reason for durable medical equipment:  Other - nonweight bearing status bilaterally.    I certify that, based on my findings, the following durable medical equipment is medically necessary:    Wheelchair   Patient needs manual wheelchair for use inside the home based on the above diagnosis. Per guidelines patient meets criteria in the following ways:   A.  Patient has significant impairment in the following Toileting and ambulating  and is is unable to complete these tasks in a reasonable timeframe and places the patient at a heightened risk of morbidity.   B.  The patient's mobility limitations cannot be sufficiently resolved by use of  fitted cane or walker.   C.  The patient reports his home provides adequate access between rooms,  maneuvering space, and surfaces for use of the manual wheelchair that is  provided.   D.  The use of the manual wheelchair will significantly improve the patient's  ability to participate in MRADLs and the patient will use it on a regular basis in  the home.   E.  The patient has not expressed an unwillingness to use the manual  wheelchair.   F. The patient has limitations of strength, endurance, range of motion, or coordination per OT notes:.    My Clinical findings support the need for the above equipment due to:  Abnormal Gait- nonweightbearing bilaterally

## 2024-06-13 PROCEDURE — 99232 SBSQ HOSP IP/OBS MODERATE 35: CPT

## 2024-06-13 PROCEDURE — 97530 THERAPEUTIC ACTIVITIES: CPT | Mod: CQ

## 2024-06-13 PROCEDURE — A9270 NON-COVERED ITEM OR SERVICE: HCPCS | Performed by: NURSE PRACTITIONER

## 2024-06-13 PROCEDURE — 700102 HCHG RX REV CODE 250 W/ 637 OVERRIDE(OP): Performed by: NURSE PRACTITIONER

## 2024-06-13 PROCEDURE — 700111 HCHG RX REV CODE 636 W/ 250 OVERRIDE (IP): Mod: JZ | Performed by: PHYSICIAN ASSISTANT

## 2024-06-13 PROCEDURE — 700102 HCHG RX REV CODE 250 W/ 637 OVERRIDE(OP)

## 2024-06-13 PROCEDURE — A9270 NON-COVERED ITEM OR SERVICE: HCPCS

## 2024-06-13 PROCEDURE — 770001 HCHG ROOM/CARE - MED/SURG/GYN PRIV*

## 2024-06-13 RX ADMIN — DOCUSATE SODIUM 100 MG: 100 CAPSULE, LIQUID FILLED ORAL at 04:22

## 2024-06-13 RX ADMIN — METAXALONE 800 MG: 800 TABLET ORAL at 04:22

## 2024-06-13 RX ADMIN — METAXALONE 800 MG: 800 TABLET ORAL at 16:47

## 2024-06-13 RX ADMIN — METAXALONE 800 MG: 800 TABLET ORAL at 12:19

## 2024-06-13 RX ADMIN — DULOXETINE HYDROCHLORIDE 30 MG: 30 CAPSULE, DELAYED RELEASE ORAL at 04:22

## 2024-06-13 RX ADMIN — SENNOSIDES AND DOCUSATE SODIUM 1 TABLET: 50; 8.6 TABLET ORAL at 21:04

## 2024-06-13 RX ADMIN — GABAPENTIN 300 MG: 300 CAPSULE ORAL at 04:22

## 2024-06-13 RX ADMIN — GABAPENTIN 300 MG: 300 CAPSULE ORAL at 21:04

## 2024-06-13 RX ADMIN — OXYCODONE HYDROCHLORIDE 15 MG: 15 TABLET ORAL at 21:04

## 2024-06-13 RX ADMIN — ENOXAPARIN SODIUM 40 MG: 100 INJECTION SUBCUTANEOUS at 04:23

## 2024-06-13 RX ADMIN — ENOXAPARIN SODIUM 40 MG: 100 INJECTION SUBCUTANEOUS at 16:48

## 2024-06-13 RX ADMIN — OXYCODONE HYDROCHLORIDE 15 MG: 15 TABLET ORAL at 09:05

## 2024-06-13 RX ADMIN — OXYCODONE HYDROCHLORIDE 15 MG: 15 TABLET ORAL at 12:20

## 2024-06-13 RX ADMIN — GABAPENTIN 300 MG: 300 CAPSULE ORAL at 12:19

## 2024-06-13 RX ADMIN — OXYCODONE HYDROCHLORIDE 15 MG: 15 TABLET ORAL at 04:26

## 2024-06-13 RX ADMIN — OXYCODONE HYDROCHLORIDE 15 MG: 15 TABLET ORAL at 16:47

## 2024-06-13 RX ADMIN — ACETAMINOPHEN 1000 MG: 500 TABLET, FILM COATED ORAL at 23:36

## 2024-06-13 ASSESSMENT — COGNITIVE AND FUNCTIONAL STATUS - GENERAL
STANDING UP FROM CHAIR USING ARMS: TOTAL
MOVING TO AND FROM BED TO CHAIR: A LITTLE
CLIMB 3 TO 5 STEPS WITH RAILING: TOTAL
WALKING IN HOSPITAL ROOM: TOTAL
TURNING FROM BACK TO SIDE WHILE IN FLAT BAD: A LITTLE
MOVING FROM LYING ON BACK TO SITTING ON SIDE OF FLAT BED: A LITTLE
SUGGESTED CMS G CODE MODIFIER MOBILITY: CL
MOBILITY SCORE: 12

## 2024-06-13 ASSESSMENT — PAIN DESCRIPTION - PAIN TYPE
TYPE: ACUTE PAIN;SURGICAL PAIN
TYPE: ACUTE PAIN
TYPE: ACUTE PAIN;SURGICAL PAIN

## 2024-06-13 ASSESSMENT — GAIT ASSESSMENTS: GAIT LEVEL OF ASSIST: UNABLE TO PARTICIPATE

## 2024-06-13 NOTE — CARE PLAN
The patient is Stable - Low risk of patient condition declining or worsening    Shift Goals  Clinical Goals: pain control, safety,mobility  Patient Goals: pain control, rest  Family Goals: n/a    Progress made toward(s) clinical / shift goals:    Problem: Skin Integrity  Goal: Skin integrity is maintained or improved  Outcome: Progressing     Problem: Pain - Standard  Goal: Alleviation of pain or a reduction in pain to the patient’s comfort goal  Outcome: Progressing       Patient is not progressing towards the following goals:
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
## 2024-06-13 NOTE — THERAPY
Physical Therapy   Discharge     Patient Name: Hayden Manzanares II  Age:  34 y.o., Sex:  male  Medical Record #: [id number]  Today's Date: 6/13/2024     Precautions  Precautions: Fall Risk;Non Weight Bearing Right Lower Extremity;Non Weight Bearing Left Lower Extremity;CAM Boot;Immobilizer Right Lower Extremity;Platform Weight Bearing Left Upper Extremity;Weight Bearing As Tolerated Right Upper Extremity  Comments: hinged knee brace 20 set at 20 degrees; R CAM boot; L foot splint    Assessment    Pt seen and greeted for follow up PT session. Pt does not need assist for bed mobility. Pt was able to perform multiple slide board transfers between bed, WC, and [facility] w/ SPV. Due to WB prx on L UE and painful R scapula fx, pt only needs assist for slide board placement and removal, otherwise pt is able to manage transfer, WC arm rest, legs rests, and brakes w/o assist. Patient has achieved highest practical level of function in an acute care setting given weight bearing restrictions x3 limbs and will not actively be followed by acute physical therapy services, however may be seen if requested by physician for 1 more visit within 30 days to address any discharge or equipment needs. Pt's current barriers to DC home from PT standpoint are likely to need light assist for car slide board transfers due to varying heights and distances and need to navigate 3 stairs to enter home; currently the pt's parents have lifting restrictions due to recent surgeries. During previous in person conversations with the patient's father he reported willingness to have a ramp installed.     Plan    Reason for Discharge From Therapy: Discharge Secondary to Goals Met (DC needs only)    DC Equipment Recommendations:  (WC has been delivered to patients. He would need to acquire slide board out of pocket per discussion with .)  Discharge Recommendations: Other - (Pt is at functional plateau from PT standpoint given weight bearing restrictions on  "x3 limbs. Recommend DC home with family IF they are able to provide ramp to enter home and light assist for car transfers vs placement.)       06/13/24 1600   Pain 0 - 10 Group   Therapist Pain Assessment Post Activity Pain Same as Prior to Activity;Nurse Notified  (\"a little\")   Cognition    Comments cooperative, flat affect, decreased motivation   Balance   Sitting Balance (Static) Good   Sitting Balance (Dynamic) Fair +   Weight Shift Sitting Fair   Skilled Intervention Verbal Cuing   Comments slide board transfer   Bed Mobility    Supine to Sit Supervised   Sit to Supine Supervised   Scooting Supervised   Rolling Supervised   Comments flat HOB, no use of rails   Gait Analysis   Gait Level Of Assist Unable to Participate   Functional Mobility   Sit to Stand Unable to Participate   Bed, Chair, Wheelchair Transfer Supervised   Toilet Transfers Supervised   Transfer Method Slide Board   Mobility bed<>WC x1 w/o slide board. bed<>WC w/ slide board. bed>BSC>WC>bed w/ slideboard.   Wheelchair Assist Supervised   Distance Wheelchair (Feet or Distance) 30   Skilled Intervention Verbal Cuing   Comments Improved technique and WB prx adherence w/ use of slideboard. Pt performed multiple transfers w/ slideboard w/ SPV, pt only needs assist placing and removing slideboard due to L UE WB prx and pain managing slideboard w/ R scap fx. Pt is able to manange leg rests, arm rests, brakes, and WC mgmt to set up for transfer to bed.   Short Term Goals    Short Term Goal # 1 Pt will perform bed mobility from a flat bed with supervision to progress to home in 6 visits.   Goal Outcome # 1 Goal met   Short Term Goal # 2 Pt will transfer via slide board to the wheelchair with supervision to progress independence in 6 visits.   Goal Outcome # 2 Goal met   Short Term Goal # 3 Pt will propel manual wheelchair 100ft with supervision for short household distances in 6 visits.   Goal Outcome # 3 Goal met       "
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
## 2024-06-13 NOTE — DISCHARGE PLANNING
Case Management Discharge Planning    Admission Date: 5/23/2024  GMLOS: 5.6  ALOS: 21    6-Clicks ADL Score: 19  6-Clicks Mobility Score: 12    Anticipated Discharge Dispo: Discharge Disposition: D/T to SNF with Medicare cert in anticipation of skilled care (03)    DME Needed: Yes    DME Ordered: Yes    Action(s) Taken: Pt was discussed during morning rounds, pt is medically cleared to DC to post-acute placement. Provider requested LMSW to follow up with [facility] regarding reconsideration of placement. LMSW placed phone call to [facility], spoke to Benjie, who stated that pt will continue to be declined at this facility. LMSW informed provider and requested DPA to follow up with pending referrals.    UPDATE 1340  LMSW made phone call to Lara with [place] [employer] regarding placement assistance. Lara stated that pt has no record of being hospitalized at [facility] and they are not able to provide assistance due to this.      Escalations Completed: None    Medically Clear: Yes    Next Steps: LMSW to follow for additional post-acute placement options.    Barriers to Discharge: Pending Placement    Is the patient up for discharge tomorrow: No

## 2024-06-13 NOTE — CARE PLAN
The patient is Stable - Low risk of patient condition declining or worsening    Shift Goals  Clinical Goals: Pain, safety  Patient Goals: Pain  Family Goals: not present    Progress made toward(s) clinical / shift goals:    Problem: Pain - Standard  Goal: Alleviation of pain or a reduction in pain to the patient’s comfort goal  Outcome: Progressing     Problem: Wound/ / Incision Healing  Goal: Patient's wound/surgical incision will decrease in size and heals properly  Outcome: Progressing  Note: Dressing is clean, dry and intact         Patient is not progressing towards the following goals:

## 2024-06-13 NOTE — DISCHARGE PLANNING
1009  Agency/Facility Name: [facility]  Outcome: DPA called regarding pending referral. Left VM with name and callback number.    1012  Agency/Facility Name: [facility]  Outcome: DPA called regarding pending referral. Left VM with name and callback number.    1015  [facility]  Spoke To: Tia   Outcome: DPA called regarding pending referral. Left message with  with name and callback number.     1017  [facility]  Spoke To: Yvonne    Outcome:  DPA called regarding pending referral. Left name and callback number with .     1019  Agency/Facility Name:  [facility]  Spoke To: Tasia  Outcome: DPA received call from facility stating they will review referral and follow up.     1022  [facility]  Spoke To: Tea  Outcome: DPA called regarding pending referral. There are no open beds at the moment and requested we josé declined in Epic.     1024  Agency/Facility Name: [facility]  Outcome: DPA called regarding pending referral. Left VM with name and callback number.    1028  Agency/Facility Name: [facility]  Spoke To: Latia  Outcome:  DPA called regarding pending referral. They are unable to accept due to insurance. Epic updated.

## 2024-06-14 LAB
ANION GAP SERPL CALC-SCNC: 17 MMOL/L (ref 7–16)
ANION GAP SERPL CALC-SCNC: 17 MMOL/L (ref 7–16)
BASOPHILS # BLD AUTO: 0.6 % (ref 0–1.8)
BASOPHILS # BLD AUTO: 0.6 % (ref 0–1.8)
BASOPHILS # BLD: 0.03 K/UL (ref 0–0.12)
BASOPHILS # BLD: 0.03 K/UL (ref 0–0.12)
BUN SERPL-MCNC: 17 MG/DL (ref 8–22)
BUN SERPL-MCNC: 17 MG/DL (ref 8–22)
CALCIUM SERPL-MCNC: 9.2 MG/DL (ref 8.5–10.5)
CALCIUM SERPL-MCNC: 9.2 MG/DL (ref 8.5–10.5)
CHLORIDE SERPL-SCNC: 102 MMOL/L (ref 96–112)
CHLORIDE SERPL-SCNC: 102 MMOL/L (ref 96–112)
CO2 SERPL-SCNC: 19 MMOL/L (ref 20–33)
CO2 SERPL-SCNC: 19 MMOL/L (ref 20–33)
CREAT SERPL-MCNC: 0.92 MG/DL (ref 0.5–1.4)
CREAT SERPL-MCNC: 0.92 MG/DL (ref 0.5–1.4)
EOSINOPHIL # BLD AUTO: 0.14 K/UL (ref 0–0.51)
EOSINOPHIL # BLD AUTO: 0.14 K/UL (ref 0–0.51)
EOSINOPHIL NFR BLD: 2.7 % (ref 0–6.9)
EOSINOPHIL NFR BLD: 2.7 % (ref 0–6.9)
ERYTHROCYTE [DISTWIDTH] IN BLOOD BY AUTOMATED COUNT: 45.7 FL (ref 35.9–50)
ERYTHROCYTE [DISTWIDTH] IN BLOOD BY AUTOMATED COUNT: 45.7 FL (ref 35.9–50)
GFR SERPLBLD CREATININE-BSD FMLA CKD-EPI: 113 ML/MIN/1.73 M 2
GFR SERPLBLD CREATININE-BSD FMLA CKD-EPI: 113 ML/MIN/1.73 M 2
GLUCOSE SERPL-MCNC: 137 MG/DL (ref 65–99)
GLUCOSE SERPL-MCNC: 137 MG/DL (ref 65–99)
HCT VFR BLD AUTO: 38.1 % (ref 42–52)
HCT VFR BLD AUTO: 38.1 % (ref 42–52)
HGB BLD-MCNC: 12.3 G/DL (ref 14–18)
HGB BLD-MCNC: 12.3 G/DL (ref 14–18)
IMM GRANULOCYTES # BLD AUTO: 0.04 K/UL (ref 0–0.11)
IMM GRANULOCYTES # BLD AUTO: 0.04 K/UL (ref 0–0.11)
IMM GRANULOCYTES NFR BLD AUTO: 0.8 % (ref 0–0.9)
IMM GRANULOCYTES NFR BLD AUTO: 0.8 % (ref 0–0.9)
LYMPHOCYTES # BLD AUTO: 1.8 K/UL (ref 1–4.8)
LYMPHOCYTES # BLD AUTO: 1.8 K/UL (ref 1–4.8)
LYMPHOCYTES NFR BLD: 34.1 % (ref 22–41)
LYMPHOCYTES NFR BLD: 34.1 % (ref 22–41)
MCH RBC QN AUTO: 28.5 PG (ref 27–33)
MCH RBC QN AUTO: 28.5 PG (ref 27–33)
MCHC RBC AUTO-ENTMCNC: 32.3 G/DL (ref 32.3–36.5)
MCHC RBC AUTO-ENTMCNC: 32.3 G/DL (ref 32.3–36.5)
MCV RBC AUTO: 88.2 FL (ref 81.4–97.8)
MCV RBC AUTO: 88.2 FL (ref 81.4–97.8)
MONOCYTES # BLD AUTO: 0.58 K/UL (ref 0–0.85)
MONOCYTES # BLD AUTO: 0.58 K/UL (ref 0–0.85)
MONOCYTES NFR BLD AUTO: 11 % (ref 0–13.4)
MONOCYTES NFR BLD AUTO: 11 % (ref 0–13.4)
NEUTROPHILS # BLD AUTO: 2.69 K/UL (ref 1.82–7.42)
NEUTROPHILS # BLD AUTO: 2.69 K/UL (ref 1.82–7.42)
NEUTROPHILS NFR BLD: 50.8 % (ref 44–72)
NEUTROPHILS NFR BLD: 50.8 % (ref 44–72)
NRBC # BLD AUTO: 0 K/UL
NRBC # BLD AUTO: 0 K/UL
NRBC BLD-RTO: 0 /100 WBC (ref 0–0.2)
NRBC BLD-RTO: 0 /100 WBC (ref 0–0.2)
PLATELET # BLD AUTO: 368 K/UL (ref 164–446)
PLATELET # BLD AUTO: 368 K/UL (ref 164–446)
PMV BLD AUTO: 9.9 FL (ref 9–12.9)
PMV BLD AUTO: 9.9 FL (ref 9–12.9)
POTASSIUM SERPL-SCNC: 3.7 MMOL/L (ref 3.6–5.5)
POTASSIUM SERPL-SCNC: 3.7 MMOL/L (ref 3.6–5.5)
RBC # BLD AUTO: 4.32 M/UL (ref 4.7–6.1)
RBC # BLD AUTO: 4.32 M/UL (ref 4.7–6.1)
SODIUM SERPL-SCNC: 138 MMOL/L (ref 135–145)
SODIUM SERPL-SCNC: 138 MMOL/L (ref 135–145)
WBC # BLD AUTO: 5.3 K/UL (ref 4.8–10.8)
WBC # BLD AUTO: 5.3 K/UL (ref 4.8–10.8)

## 2024-06-14 PROCEDURE — 85025 COMPLETE CBC W/AUTO DIFF WBC: CPT

## 2024-06-14 PROCEDURE — 770001 HCHG ROOM/CARE - MED/SURG/GYN PRIV*

## 2024-06-14 PROCEDURE — A9270 NON-COVERED ITEM OR SERVICE: HCPCS

## 2024-06-14 PROCEDURE — 700111 HCHG RX REV CODE 636 W/ 250 OVERRIDE (IP): Mod: JZ | Performed by: PHYSICIAN ASSISTANT

## 2024-06-14 PROCEDURE — 99231 SBSQ HOSP IP/OBS SF/LOW 25: CPT

## 2024-06-14 PROCEDURE — 700102 HCHG RX REV CODE 250 W/ 637 OVERRIDE(OP): Performed by: NURSE PRACTITIONER

## 2024-06-14 PROCEDURE — 80048 BASIC METABOLIC PNL TOTAL CA: CPT

## 2024-06-14 PROCEDURE — 700102 HCHG RX REV CODE 250 W/ 637 OVERRIDE(OP)

## 2024-06-14 PROCEDURE — A9270 NON-COVERED ITEM OR SERVICE: HCPCS | Performed by: NURSE PRACTITIONER

## 2024-06-14 PROCEDURE — 36415 COLL VENOUS BLD VENIPUNCTURE: CPT

## 2024-06-14 RX ADMIN — ENOXAPARIN SODIUM 40 MG: 100 INJECTION SUBCUTANEOUS at 04:08

## 2024-06-14 RX ADMIN — OXYCODONE HYDROCHLORIDE 15 MG: 15 TABLET ORAL at 18:08

## 2024-06-14 RX ADMIN — METAXALONE 800 MG: 800 TABLET ORAL at 04:07

## 2024-06-14 RX ADMIN — OXYCODONE HYDROCHLORIDE 15 MG: 15 TABLET ORAL at 00:43

## 2024-06-14 RX ADMIN — OXYCODONE HYDROCHLORIDE 15 MG: 15 TABLET ORAL at 09:38

## 2024-06-14 RX ADMIN — DULOXETINE HYDROCHLORIDE 30 MG: 30 CAPSULE, DELAYED RELEASE ORAL at 04:07

## 2024-06-14 RX ADMIN — OXYCODONE HYDROCHLORIDE 15 MG: 15 TABLET ORAL at 21:54

## 2024-06-14 RX ADMIN — SENNOSIDES AND DOCUSATE SODIUM 1 TABLET: 50; 8.6 TABLET ORAL at 20:47

## 2024-06-14 RX ADMIN — METAXALONE 800 MG: 800 TABLET ORAL at 18:09

## 2024-06-14 RX ADMIN — DOCUSATE SODIUM 100 MG: 100 CAPSULE, LIQUID FILLED ORAL at 04:07

## 2024-06-14 RX ADMIN — OXYCODONE HYDROCHLORIDE 15 MG: 15 TABLET ORAL at 13:50

## 2024-06-14 RX ADMIN — GABAPENTIN 300 MG: 300 CAPSULE ORAL at 13:50

## 2024-06-14 RX ADMIN — OXYCODONE HYDROCHLORIDE 15 MG: 15 TABLET ORAL at 04:07

## 2024-06-14 RX ADMIN — ENOXAPARIN SODIUM 40 MG: 100 INJECTION SUBCUTANEOUS at 18:09

## 2024-06-14 RX ADMIN — METAXALONE 800 MG: 800 TABLET ORAL at 13:50

## 2024-06-14 RX ADMIN — GABAPENTIN 300 MG: 300 CAPSULE ORAL at 04:07

## 2024-06-14 RX ADMIN — GABAPENTIN 300 MG: 300 CAPSULE ORAL at 20:47

## 2024-06-14 ASSESSMENT — PAIN DESCRIPTION - PAIN TYPE
TYPE: ACUTE PAIN;SURGICAL PAIN
TYPE: ACUTE PAIN;SURGICAL PAIN
TYPE: SURGICAL PAIN;ACUTE PAIN
TYPE: ACUTE PAIN;SURGICAL PAIN

## 2024-06-14 NOTE — PROGRESS NOTES
Virtual Nurse rounding complete.    Round Needs: Pain Rating 10/10 pain to left foot, requesting pain medication and Notified of Patient Needs: primary RN.

## 2024-06-14 NOTE — CARE PLAN
The patient is Stable - Low risk of patient condition declining or worsening    Shift Goals  Clinical Goals: Mobility, pain  Patient Goals: Pain  Family Goals: not present    Progress made toward(s) clinical / shift goals:    Problem: Skin Integrity  Goal: Skin integrity is maintained or improved  6/14/2024 1531 by Lizzy Herron, R.N.  Outcome: Progressing  6/14/2024 0945 by Lizzy Herron, R.N.  Outcome: Progressing     Problem: Pain - Standard  Goal: Alleviation of pain or a reduction in pain to the patient’s comfort goal  Outcome: Progressing     Problem: Bowel Elimination  Goal: Establish and maintain regular bowel function  Outcome: Progressing     Problem: Wound/ / Incision Healing  Goal: Patient's wound/surgical incision will decrease in size and heals properly  6/14/2024 1531 by Lizzy Herron, R.N.  Outcome: Progressing  6/14/2024 0945 by Lizzy Herron, R.N.  Outcome: Progressing       Patient is not progressing towards the following goals:

## 2024-06-14 NOTE — PROGRESS NOTES
"      Orthopaedic Progress Note    Interval changes:  Patient doing well    LLE dressings CDI  Cleared for DC by ortho pending trauma clearance    ROS - Patient denies any new issues.  Pain well controlled.    /81   Pulse 80   Temp 36.3 °C (97.3 °F) (Temporal)   Resp 17   Ht 1.753 m (5' 9.02\")   Wt 77.1 kg (170 lb)   SpO2 98%     Patient seen and examined  No acute distress  Breathing non labored  RRR  RLE hinged knee brace in place, in cam boot, DNVI, moves all toes, cap refill <2 sec. LLE in short leg splint CDI, DNVI, moves all toes, cap refil <2 sec     Active Hospital Problems    Diagnosis     Discharge planning issues [Z75.8]      Priority: High    Multiple fractures of left foot, closed, initial encounter [S92.902A]      Priority: High    Closed fracture of right tibial plateau [S82.141A]      Priority: Medium    Fracture of rib of left side [S22.32XA]      Priority: Medium    Closed left ankle fracture [S82.892A]      Priority: Medium    Sacral fracture, closed (HCC) [S32.10XA]      Priority: Medium    Multiple lacerations [T07.XXXA]      Priority: Medium    Multiple closed fractures of metacarpal bones [S62.309A]      Priority: Medium    Closed fracture of right scapula [S42.101A]      Priority: Medium    Closed fracture of right ankle [S82.891A]      Priority: Medium    Evaluation by psychiatric service required [Z00.8]      Priority: Low    Trauma [T14.90XA]      Priority: Low    No contraindication to deep vein thrombosis (DVT) prophylaxis [Z78.9]      Priority: Low    Compression fracture of T8 vertebra (HCC) [S22.060A]      Priority: Low       Assessment/Plan:  Patient doing well    LLE splint dressings CDI  Cleared for DC by ortho pending trauma clearance  POD#8 S/P:  1.  Open treatment and internal fixation of left first metatarsal fracture.  2.  Open treatment and internal fixation of left first tarsometatarsal joint.  POD#15 S/P Closed treatment with manipulation, left fourth and fifth " metacarpal fractures and application of short arm splint.  POD#21 S/P:  1.  Open treatment and internal fixation of left bimalleolar ankle fracture.  2.  Open treatment and internal fixation of left distal tibiofibular joint disruption.  3.  Excisional debridement of left leg laceration measuring 5x1.5 cm including skin and subcutaneous tissue.  4.  Layered repair of intermediate complexity laceration measuring 5 cm, left leg.   5. Closed treatment without manipulation of right talar dome fracture  6. Closed treatment without manipulation of right calcaneus fracture  7. Closed treatment without manipulation of right tibia plateau/eminence fracture  8. Closed treatment without manipulation of right lateral malleolus fracture  9. Closed treatment without manipulation of left sacrum and anterior pelvis fractures  Wt bearing status - NWB BLE  Wound care/Drains - Dressings to be changed every other day by nursing. Or PRN for saturation starting POD#2  Future Procedures - none planned   Lovenox: Start 5/23, Duration-until ambulatory > 150'  Sutures/Staples out- 14-21 days post operatively. Removal will completed by ortho mid levels only.  PT/OT-initiated  Antibiotics: Perioperative completed  DVT Prophylaxis- TEDS/SCDs/Foot pumps  Campos-not needed per ortho  Case Coordination for Discharge Planning - Disposition per therapy recs.

## 2024-06-14 NOTE — CARE PLAN
The patient is Stable - Low risk of patient condition declining or worsening    Shift Goals  Clinical Goals: pain control, safety  Patient Goals: pain ,  Family Goals: n/a    Progress made toward(s) clinical / shift goals:    Problem: Skin Integrity  Goal: Skin integrity is maintained or improved  Outcome: Progressing     Problem: Pain - Standard  Goal: Alleviation of pain or a reduction in pain to the patient’s comfort goal  Outcome: Progressing       Patient is not progressing towards the following goals:
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
## 2024-06-14 NOTE — PROGRESS NOTES
"0940: Patient medicated per MAR for 10/10 pain, Nurse asked if patient would like to mobilize, patient said \"not now\" nurse offered to open blinds patient said \"NO\".     1350: patient educated on IS and instructed to perform 10 times per hour while awake. Asked patient if he would like to mobilize to chair, patient declined.   "

## 2024-06-15 PROCEDURE — 700102 HCHG RX REV CODE 250 W/ 637 OVERRIDE(OP)

## 2024-06-15 PROCEDURE — 99232 SBSQ HOSP IP/OBS MODERATE 35: CPT

## 2024-06-15 PROCEDURE — A9270 NON-COVERED ITEM OR SERVICE: HCPCS

## 2024-06-15 PROCEDURE — A9270 NON-COVERED ITEM OR SERVICE: HCPCS | Performed by: NURSE PRACTITIONER

## 2024-06-15 PROCEDURE — 700102 HCHG RX REV CODE 250 W/ 637 OVERRIDE(OP): Performed by: NURSE PRACTITIONER

## 2024-06-15 PROCEDURE — 700111 HCHG RX REV CODE 636 W/ 250 OVERRIDE (IP): Mod: JZ | Performed by: PHYSICIAN ASSISTANT

## 2024-06-15 PROCEDURE — 770001 HCHG ROOM/CARE - MED/SURG/GYN PRIV*

## 2024-06-15 RX ORDER — OXYCODONE HYDROCHLORIDE 5 MG/1
5 TABLET ORAL
Status: DISCONTINUED | OUTPATIENT
Start: 2024-06-15 | End: 2024-06-22

## 2024-06-15 RX ORDER — OXYCODONE HYDROCHLORIDE 10 MG/1
10 TABLET ORAL
Status: DISCONTINUED | OUTPATIENT
Start: 2024-06-15 | End: 2024-06-22

## 2024-06-15 RX ADMIN — GABAPENTIN 300 MG: 300 CAPSULE ORAL at 20:30

## 2024-06-15 RX ADMIN — METAXALONE 800 MG: 800 TABLET ORAL at 16:28

## 2024-06-15 RX ADMIN — OXYCODONE HYDROCHLORIDE 10 MG: 10 TABLET ORAL at 16:28

## 2024-06-15 RX ADMIN — METAXALONE 800 MG: 800 TABLET ORAL at 04:20

## 2024-06-15 RX ADMIN — SENNOSIDES AND DOCUSATE SODIUM 1 TABLET: 50; 8.6 TABLET ORAL at 20:30

## 2024-06-15 RX ADMIN — OXYCODONE HYDROCHLORIDE 15 MG: 15 TABLET ORAL at 04:20

## 2024-06-15 RX ADMIN — ENOXAPARIN SODIUM 40 MG: 100 INJECTION SUBCUTANEOUS at 16:27

## 2024-06-15 RX ADMIN — POLYETHYLENE GLYCOL 3350 1 PACKET: 17 POWDER, FOR SOLUTION ORAL at 04:20

## 2024-06-15 RX ADMIN — DOCUSATE SODIUM 100 MG: 100 CAPSULE, LIQUID FILLED ORAL at 04:20

## 2024-06-15 RX ADMIN — GABAPENTIN 300 MG: 300 CAPSULE ORAL at 04:20

## 2024-06-15 RX ADMIN — POLYETHYLENE GLYCOL 3350 1 PACKET: 17 POWDER, FOR SOLUTION ORAL at 16:28

## 2024-06-15 RX ADMIN — ENOXAPARIN SODIUM 40 MG: 100 INJECTION SUBCUTANEOUS at 04:20

## 2024-06-15 RX ADMIN — DULOXETINE HYDROCHLORIDE 30 MG: 30 CAPSULE, DELAYED RELEASE ORAL at 04:20

## 2024-06-15 RX ADMIN — GABAPENTIN 300 MG: 300 CAPSULE ORAL at 14:43

## 2024-06-15 RX ADMIN — METAXALONE 800 MG: 800 TABLET ORAL at 11:34

## 2024-06-15 RX ADMIN — MAGNESIUM HYDROXIDE 30 ML: 1200 LIQUID ORAL at 16:27

## 2024-06-15 RX ADMIN — OXYCODONE HYDROCHLORIDE 15 MG: 15 TABLET ORAL at 11:34

## 2024-06-15 RX ADMIN — DOCUSATE SODIUM 100 MG: 100 CAPSULE, LIQUID FILLED ORAL at 16:28

## 2024-06-15 ASSESSMENT — PAIN DESCRIPTION - PAIN TYPE
TYPE: ACUTE PAIN;SURGICAL PAIN
TYPE: ACUTE PAIN
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
## 2024-06-15 NOTE — CARE PLAN
The patient is Stable - Low risk of patient condition declining or worsening    Shift Goals  Clinical Goals: Pain control, mobility, IS  Patient Goals: Pain control, comfort  Family Goals: N/A    Progress made toward(s) clinical / shift goals:    Problem: Skin Integrity  Goal: Skin integrity is maintained or improved  Outcome: Progressing     Problem: Communication  Goal: The ability to communicate needs accurately and effectively will improve  Outcome: Progressing     Problem: Pain - Standard  Goal: Alleviation of pain or a reduction in pain to the patient’s comfort goal  Outcome: Progressing       Patient is not progressing towards the following goals:

## 2024-06-15 NOTE — PROGRESS NOTES
"      Orthopaedic Progress Note    Interval changes:  Patient doing well    LLE dressings CDI  Cleared for DC by ortho pending trauma clearance    ROS - Patient denies any new issues.  Pain well controlled.    /76   Pulse 97   Temp 36.1 °C (97 °F) (Temporal)   Resp 17   Ht 1.753 m (5' 9.02\")   Wt 77.1 kg (170 lb)   SpO2 94%     Patient seen and examined  No acute distress  Breathing non labored  RRR  RLE hinged knee brace in place, in cam boot, DNVI, moves all toes, cap refill <2 sec. LLE in short leg splint CDI, DNVI, moves all toes, cap refil <2 sec     Active Hospital Problems    Diagnosis     Discharge planning issues [Z75.8]      Priority: High    Multiple fractures of left foot, closed, initial encounter [S92.902A]      Priority: High    Closed fracture of right tibial plateau [S82.141A]      Priority: Medium    Fracture of rib of left side [S22.32XA]      Priority: Medium    Closed left ankle fracture [S82.892A]      Priority: Medium    Sacral fracture, closed (HCC) [S32.10XA]      Priority: Medium    Multiple lacerations [T07.XXXA]      Priority: Medium    Multiple closed fractures of metacarpal bones [S62.309A]      Priority: Medium    Closed fracture of right scapula [S42.101A]      Priority: Medium    Closed fracture of right ankle [S82.891A]      Priority: Medium    Evaluation by psychiatric service required [Z00.8]      Priority: Low    Trauma [T14.90XA]      Priority: Low    No contraindication to deep vein thrombosis (DVT) prophylaxis [Z78.9]      Priority: Low    Compression fracture of T8 vertebra (HCC) [S22.060A]      Priority: Low       Assessment/Plan:  Patient doing well    LLE splint dressings CDI  Cleared for DC by ortho pending trauma clearance  POD#9 S/P:  1.  Open treatment and internal fixation of left first metatarsal fracture.  2.  Open treatment and internal fixation of left first tarsometatarsal joint.  POD#16 S/P Closed treatment with manipulation, left fourth and fifth " metacarpal fractures and application of short arm splint.  POD#22 S/P:  1.  Open treatment and internal fixation of left bimalleolar ankle fracture.  2.  Open treatment and internal fixation of left distal tibiofibular joint disruption.  3.  Excisional debridement of left leg laceration measuring 5x1.5 cm including skin and subcutaneous tissue.  4.  Layered repair of intermediate complexity laceration measuring 5 cm, left leg.   5. Closed treatment without manipulation of right talar dome fracture  6. Closed treatment without manipulation of right calcaneus fracture  7. Closed treatment without manipulation of right tibia plateau/eminence fracture  8. Closed treatment without manipulation of right lateral malleolus fracture  9. Closed treatment without manipulation of left sacrum and anterior pelvis fractures  Wt bearing status - NWB BLE  Wound care/Drains - Dressings to be changed every other day by nursing. Or PRN for saturation starting POD#2  Future Procedures - none planned   Lovenox: Start 5/23, Duration-until ambulatory > 150'  Sutures/Staples out- 14-21 days post operatively. Removal will completed by ortho mid levels only.  PT/OT-initiated  Antibiotics: Perioperative completed  DVT Prophylaxis- TEDS/SCDs/Foot pumps  Campos-not needed per ortho  Case Coordination for Discharge Planning - Disposition per therapy recs.

## 2024-06-16 PROCEDURE — 700111 HCHG RX REV CODE 636 W/ 250 OVERRIDE (IP): Mod: JZ | Performed by: PHYSICIAN ASSISTANT

## 2024-06-16 PROCEDURE — A9270 NON-COVERED ITEM OR SERVICE: HCPCS

## 2024-06-16 PROCEDURE — 770001 HCHG ROOM/CARE - MED/SURG/GYN PRIV*

## 2024-06-16 PROCEDURE — A9270 NON-COVERED ITEM OR SERVICE: HCPCS | Performed by: NURSE PRACTITIONER

## 2024-06-16 PROCEDURE — 700102 HCHG RX REV CODE 250 W/ 637 OVERRIDE(OP): Performed by: NURSE PRACTITIONER

## 2024-06-16 PROCEDURE — 700102 HCHG RX REV CODE 250 W/ 637 OVERRIDE(OP)

## 2024-06-16 PROCEDURE — 99231 SBSQ HOSP IP/OBS SF/LOW 25: CPT | Performed by: PHYSICIAN ASSISTANT

## 2024-06-16 RX ADMIN — DULOXETINE HYDROCHLORIDE 30 MG: 30 CAPSULE, DELAYED RELEASE ORAL at 04:23

## 2024-06-16 RX ADMIN — SENNOSIDES AND DOCUSATE SODIUM 1 TABLET: 50; 8.6 TABLET ORAL at 10:35

## 2024-06-16 RX ADMIN — METAXALONE 800 MG: 800 TABLET ORAL at 04:23

## 2024-06-16 RX ADMIN — MAGNESIUM HYDROXIDE 30 ML: 1200 LIQUID ORAL at 16:29

## 2024-06-16 RX ADMIN — OXYCODONE HYDROCHLORIDE 10 MG: 10 TABLET ORAL at 10:35

## 2024-06-16 RX ADMIN — GABAPENTIN 300 MG: 300 CAPSULE ORAL at 04:23

## 2024-06-16 RX ADMIN — GABAPENTIN 300 MG: 300 CAPSULE ORAL at 12:49

## 2024-06-16 RX ADMIN — ENOXAPARIN SODIUM 40 MG: 100 INJECTION SUBCUTANEOUS at 16:29

## 2024-06-16 RX ADMIN — POLYETHYLENE GLYCOL 3350 1 PACKET: 17 POWDER, FOR SOLUTION ORAL at 16:29

## 2024-06-16 RX ADMIN — POLYETHYLENE GLYCOL 3350 1 PACKET: 17 POWDER, FOR SOLUTION ORAL at 04:23

## 2024-06-16 RX ADMIN — OXYCODONE HYDROCHLORIDE 10 MG: 10 TABLET ORAL at 16:29

## 2024-06-16 RX ADMIN — METAXALONE 800 MG: 800 TABLET ORAL at 10:36

## 2024-06-16 RX ADMIN — GABAPENTIN 300 MG: 300 CAPSULE ORAL at 20:58

## 2024-06-16 RX ADMIN — DOCUSATE SODIUM 100 MG: 100 CAPSULE, LIQUID FILLED ORAL at 16:29

## 2024-06-16 RX ADMIN — SENNOSIDES AND DOCUSATE SODIUM 1 TABLET: 50; 8.6 TABLET ORAL at 20:58

## 2024-06-16 RX ADMIN — DOCUSATE SODIUM 100 MG: 100 CAPSULE, LIQUID FILLED ORAL at 04:23

## 2024-06-16 RX ADMIN — METAXALONE 800 MG: 800 TABLET ORAL at 16:29

## 2024-06-16 RX ADMIN — ENOXAPARIN SODIUM 40 MG: 100 INJECTION SUBCUTANEOUS at 04:23

## 2024-06-16 ASSESSMENT — PAIN DESCRIPTION - PAIN TYPE
TYPE: ACUTE PAIN

## 2024-06-16 ASSESSMENT — ENCOUNTER SYMPTOMS
ROS GI COMMENTS: LAST BM 6/11
ABDOMINAL PAIN: 0
VOMITING: 0
NAUSEA: 0

## 2024-06-16 NOTE — CARE PLAN
The patient is Stable - Low risk of patient condition declining or worsening    Shift Goals  Clinical Goals: mobility, pain control,BM  Patient Goals: pain control, rest  Family Goals: NA    Progress made toward(s) clinical / shift goals:  yes  Problem: Bowel Elimination  Goal: Establish and maintain regular bowel function  Outcome: Progressing     Problem: Pain - Standard  Goal: Alleviation of pain or a reduction in pain to the patient’s comfort goal  Outcome: Progressing       Patient is not progressing towards the following goals:
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
## 2024-06-16 NOTE — PROGRESS NOTES
Patient A&OX4. Patient stated pain but does not ask for any pain med at this time. Meds administered per MAR.  Urinal at the bedside.  Water and call light within reach.

## 2024-06-17 LAB
ANION GAP SERPL CALC-SCNC: 12 MMOL/L (ref 7–16)
ANION GAP SERPL CALC-SCNC: 12 MMOL/L (ref 7–16)
BASOPHILS # BLD AUTO: 0.3 % (ref 0–1.8)
BASOPHILS # BLD AUTO: 0.3 % (ref 0–1.8)
BASOPHILS # BLD: 0.02 K/UL (ref 0–0.12)
BASOPHILS # BLD: 0.02 K/UL (ref 0–0.12)
BUN SERPL-MCNC: 14 MG/DL (ref 8–22)
BUN SERPL-MCNC: 14 MG/DL (ref 8–22)
CALCIUM SERPL-MCNC: 9.1 MG/DL (ref 8.5–10.5)
CALCIUM SERPL-MCNC: 9.1 MG/DL (ref 8.5–10.5)
CHLORIDE SERPL-SCNC: 100 MMOL/L (ref 96–112)
CHLORIDE SERPL-SCNC: 100 MMOL/L (ref 96–112)
CO2 SERPL-SCNC: 24 MMOL/L (ref 20–33)
CO2 SERPL-SCNC: 24 MMOL/L (ref 20–33)
CREAT SERPL-MCNC: 0.78 MG/DL (ref 0.5–1.4)
CREAT SERPL-MCNC: 0.78 MG/DL (ref 0.5–1.4)
EOSINOPHIL # BLD AUTO: 0.18 K/UL (ref 0–0.51)
EOSINOPHIL # BLD AUTO: 0.18 K/UL (ref 0–0.51)
EOSINOPHIL NFR BLD: 3.1 % (ref 0–6.9)
EOSINOPHIL NFR BLD: 3.1 % (ref 0–6.9)
ERYTHROCYTE [DISTWIDTH] IN BLOOD BY AUTOMATED COUNT: 45.2 FL (ref 35.9–50)
ERYTHROCYTE [DISTWIDTH] IN BLOOD BY AUTOMATED COUNT: 45.2 FL (ref 35.9–50)
GFR SERPLBLD CREATININE-BSD FMLA CKD-EPI: 121 ML/MIN/1.73 M 2
GFR SERPLBLD CREATININE-BSD FMLA CKD-EPI: 121 ML/MIN/1.73 M 2
GLUCOSE SERPL-MCNC: 85 MG/DL (ref 65–99)
GLUCOSE SERPL-MCNC: 85 MG/DL (ref 65–99)
HCT VFR BLD AUTO: 37.8 % (ref 42–52)
HCT VFR BLD AUTO: 37.8 % (ref 42–52)
HGB BLD-MCNC: 12.8 G/DL (ref 14–18)
HGB BLD-MCNC: 12.8 G/DL (ref 14–18)
IMM GRANULOCYTES # BLD AUTO: 0.05 K/UL (ref 0–0.11)
IMM GRANULOCYTES # BLD AUTO: 0.05 K/UL (ref 0–0.11)
IMM GRANULOCYTES NFR BLD AUTO: 0.9 % (ref 0–0.9)
IMM GRANULOCYTES NFR BLD AUTO: 0.9 % (ref 0–0.9)
LYMPHOCYTES # BLD AUTO: 2.18 K/UL (ref 1–4.8)
LYMPHOCYTES # BLD AUTO: 2.18 K/UL (ref 1–4.8)
LYMPHOCYTES NFR BLD: 37.4 % (ref 22–41)
LYMPHOCYTES NFR BLD: 37.4 % (ref 22–41)
MCH RBC QN AUTO: 29.6 PG (ref 27–33)
MCH RBC QN AUTO: 29.6 PG (ref 27–33)
MCHC RBC AUTO-ENTMCNC: 33.9 G/DL (ref 32.3–36.5)
MCHC RBC AUTO-ENTMCNC: 33.9 G/DL (ref 32.3–36.5)
MCV RBC AUTO: 87.3 FL (ref 81.4–97.8)
MCV RBC AUTO: 87.3 FL (ref 81.4–97.8)
MONOCYTES # BLD AUTO: 0.69 K/UL (ref 0–0.85)
MONOCYTES # BLD AUTO: 0.69 K/UL (ref 0–0.85)
MONOCYTES NFR BLD AUTO: 11.8 % (ref 0–13.4)
MONOCYTES NFR BLD AUTO: 11.8 % (ref 0–13.4)
NEUTROPHILS # BLD AUTO: 2.71 K/UL (ref 1.82–7.42)
NEUTROPHILS # BLD AUTO: 2.71 K/UL (ref 1.82–7.42)
NEUTROPHILS NFR BLD: 46.5 % (ref 44–72)
NEUTROPHILS NFR BLD: 46.5 % (ref 44–72)
NRBC # BLD AUTO: 0 K/UL
NRBC # BLD AUTO: 0 K/UL
NRBC BLD-RTO: 0 /100 WBC (ref 0–0.2)
NRBC BLD-RTO: 0 /100 WBC (ref 0–0.2)
PLATELET # BLD AUTO: 364 K/UL (ref 164–446)
PLATELET # BLD AUTO: 364 K/UL (ref 164–446)
PMV BLD AUTO: 9.9 FL (ref 9–12.9)
PMV BLD AUTO: 9.9 FL (ref 9–12.9)
POTASSIUM SERPL-SCNC: 4 MMOL/L (ref 3.6–5.5)
POTASSIUM SERPL-SCNC: 4 MMOL/L (ref 3.6–5.5)
RBC # BLD AUTO: 4.33 M/UL (ref 4.7–6.1)
RBC # BLD AUTO: 4.33 M/UL (ref 4.7–6.1)
SODIUM SERPL-SCNC: 136 MMOL/L (ref 135–145)
SODIUM SERPL-SCNC: 136 MMOL/L (ref 135–145)
WBC # BLD AUTO: 5.8 K/UL (ref 4.8–10.8)
WBC # BLD AUTO: 5.8 K/UL (ref 4.8–10.8)

## 2024-06-17 PROCEDURE — 700102 HCHG RX REV CODE 250 W/ 637 OVERRIDE(OP)

## 2024-06-17 PROCEDURE — 99231 SBSQ HOSP IP/OBS SF/LOW 25: CPT

## 2024-06-17 PROCEDURE — A9270 NON-COVERED ITEM OR SERVICE: HCPCS

## 2024-06-17 PROCEDURE — A9270 NON-COVERED ITEM OR SERVICE: HCPCS | Performed by: NURSE PRACTITIONER

## 2024-06-17 PROCEDURE — 80048 BASIC METABOLIC PNL TOTAL CA: CPT

## 2024-06-17 PROCEDURE — 36415 COLL VENOUS BLD VENIPUNCTURE: CPT

## 2024-06-17 PROCEDURE — 700111 HCHG RX REV CODE 636 W/ 250 OVERRIDE (IP): Mod: JZ | Performed by: PHYSICIAN ASSISTANT

## 2024-06-17 PROCEDURE — 770001 HCHG ROOM/CARE - MED/SURG/GYN PRIV*

## 2024-06-17 PROCEDURE — 700102 HCHG RX REV CODE 250 W/ 637 OVERRIDE(OP): Performed by: NURSE PRACTITIONER

## 2024-06-17 PROCEDURE — 85025 COMPLETE CBC W/AUTO DIFF WBC: CPT

## 2024-06-17 RX ORDER — GABAPENTIN 100 MG/1
200 CAPSULE ORAL EVERY 8 HOURS
Status: DISCONTINUED | OUTPATIENT
Start: 2024-06-17 | End: 2024-06-26 | Stop reason: HOSPADM

## 2024-06-17 RX ADMIN — OXYCODONE HYDROCHLORIDE 10 MG: 10 TABLET ORAL at 13:56

## 2024-06-17 RX ADMIN — GABAPENTIN 200 MG: 100 CAPSULE ORAL at 21:29

## 2024-06-17 RX ADMIN — OXYCODONE HYDROCHLORIDE 10 MG: 10 TABLET ORAL at 04:21

## 2024-06-17 RX ADMIN — OXYCODONE HYDROCHLORIDE 10 MG: 10 TABLET ORAL at 16:49

## 2024-06-17 RX ADMIN — POLYETHYLENE GLYCOL 3350 1 PACKET: 17 POWDER, FOR SOLUTION ORAL at 04:20

## 2024-06-17 RX ADMIN — DOCUSATE SODIUM 100 MG: 100 CAPSULE, LIQUID FILLED ORAL at 04:21

## 2024-06-17 RX ADMIN — DOCUSATE SODIUM 100 MG: 100 CAPSULE, LIQUID FILLED ORAL at 16:49

## 2024-06-17 RX ADMIN — ENOXAPARIN SODIUM 40 MG: 100 INJECTION SUBCUTANEOUS at 16:49

## 2024-06-17 RX ADMIN — DULOXETINE HYDROCHLORIDE 30 MG: 30 CAPSULE, DELAYED RELEASE ORAL at 04:21

## 2024-06-17 RX ADMIN — METAXALONE 800 MG: 800 TABLET ORAL at 04:21

## 2024-06-17 RX ADMIN — OXYCODONE HYDROCHLORIDE 10 MG: 10 TABLET ORAL at 21:29

## 2024-06-17 RX ADMIN — GABAPENTIN 300 MG: 300 CAPSULE ORAL at 04:21

## 2024-06-17 RX ADMIN — SENNOSIDES AND DOCUSATE SODIUM 1 TABLET: 50; 8.6 TABLET ORAL at 21:29

## 2024-06-17 RX ADMIN — METAXALONE 800 MG: 800 TABLET ORAL at 16:49

## 2024-06-17 RX ADMIN — OXYCODONE HYDROCHLORIDE 10 MG: 10 TABLET ORAL at 10:40

## 2024-06-17 RX ADMIN — GABAPENTIN 200 MG: 100 CAPSULE ORAL at 13:56

## 2024-06-17 RX ADMIN — ENOXAPARIN SODIUM 40 MG: 100 INJECTION SUBCUTANEOUS at 04:20

## 2024-06-17 RX ADMIN — MAGNESIUM HYDROXIDE 30 ML: 1200 LIQUID ORAL at 16:49

## 2024-06-17 RX ADMIN — METAXALONE 800 MG: 800 TABLET ORAL at 10:40

## 2024-06-17 RX ADMIN — POLYETHYLENE GLYCOL 3350 1 PACKET: 17 POWDER, FOR SOLUTION ORAL at 18:00

## 2024-06-17 ASSESSMENT — PAIN DESCRIPTION - PAIN TYPE
TYPE: ACUTE PAIN

## 2024-06-17 ASSESSMENT — ENCOUNTER SYMPTOMS
ABDOMINAL PAIN: 0
NAUSEA: 0
ROS GI COMMENTS: LAST BM 6/11
VOMITING: 0
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
## 2024-06-17 NOTE — CARE PLAN
Problem: Bowel Elimination  Goal: Establish and maintain regular bowel function  Description: Target End Date:  Prior to discharge or change in level of care    1.   Note date of last BM  2.   Educate about diet, fluid intake, medication and activity to promote bowel function  3.   Educate signs and symptoms of constipation and interventions to implement  4.   Pharmacologic bowel management per provider order  5.   Regular toileting schedule  6.   Upright position for toileting  7.   High fiber diet  8.   Encourage hydration  9.   Collaborate with Clinical Dietician  10. Care and maintenance of ostomy if applicable  Outcome: Not Progressing     Problem: Skin Integrity  Goal: Skin integrity is maintained or improved  Description: Target End Date:  Prior to discharge or change in level of care    Document interventions on Skin Risk/Bienvenido flowsheet groups and corresponding LDA    1.  Assess and monitor skin integrity, appearance and/or temperature  2.  Assess risk factors for impaired skin integrity and/or pressures ulcers  3.  Implement precautions to protect skin integrity in collaboration with interdisciplinary team  4.  Implement pressure ulcer prevention protocol if at risk for skin breakdown  5.  Confirm wound care consult if at risk for skin breakdown  6.  Ensure patient use of pressure relieving devices  (Low air loss bed, waffle overlay, heel protectors, ROHO cushion, etc)  Outcome: Progressing     Problem: Communication  Goal: The ability to communicate needs accurately and effectively will improve  Description: Target End Date:  End of day 1    1.  Assess ability to communicate and understand  2.  Provide augmentative or alternative methods of communication devices  3.  Use /language line as appropriate  4.  Collaborate with Speech Therapy as needed  Outcome: Progressing   The patient is Stable - Low risk of patient condition declining or worsening    Shift Goals  Clinical Goals: BM, pain  control, mobility  Patient Goals: rest, pain control  Family Goals: n/a      Problem: Bowel Elimination  Goal: Establish and maintain regular bowel function  Description: Target End Date:  Prior to discharge or change in level of care    1.   Note date of last BM  2.   Educate about diet, fluid intake, medication and activity to promote bowel function  3.   Educate signs and symptoms of constipation and interventions to implement  4.   Pharmacologic bowel management per provider order  5.   Regular toileting schedule  6.   Upright position for toileting  7.   High fiber diet  8.   Encourage hydration  9.   Collaborate with Clinical Dietician  10. Care and maintenance of ostomy if applicable  Outcome: Not Progressing

## 2024-06-17 NOTE — PROGRESS NOTES
Patient A&OX4. Patient refused suppository. Scheduled meds administered per MAR.  Urinal at the bedside,  Water and call light within reach.

## 2024-06-17 NOTE — CARE PLAN
The patient is Stable - Low risk of patient condition declining or worsening    Shift Goals  Clinical Goals: mobility, BM  Patient Goals: pain control  Family Goals: NA    Progress made toward(s) clinical / shift goals:  yes  Problem: Wound/ / Incision Healing  Goal: Patient's wound/surgical incision will decrease in size and heals properly  Outcome: Progressing     Problem: Pain - Standard  Goal: Alleviation of pain or a reduction in pain to the patient’s comfort goal  Outcome: Progressing       Patient is not progressing towards the following goals:
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
## 2024-06-18 PROCEDURE — A9270 NON-COVERED ITEM OR SERVICE: HCPCS

## 2024-06-18 PROCEDURE — 36415 COLL VENOUS BLD VENIPUNCTURE: CPT

## 2024-06-18 PROCEDURE — 700111 HCHG RX REV CODE 636 W/ 250 OVERRIDE (IP): Mod: JZ | Performed by: PHYSICIAN ASSISTANT

## 2024-06-18 PROCEDURE — 700102 HCHG RX REV CODE 250 W/ 637 OVERRIDE(OP)

## 2024-06-18 PROCEDURE — A9270 NON-COVERED ITEM OR SERVICE: HCPCS | Performed by: NURSE PRACTITIONER

## 2024-06-18 PROCEDURE — 99232 SBSQ HOSP IP/OBS MODERATE 35: CPT

## 2024-06-18 PROCEDURE — 86480 TB TEST CELL IMMUN MEASURE: CPT

## 2024-06-18 PROCEDURE — 770001 HCHG ROOM/CARE - MED/SURG/GYN PRIV*

## 2024-06-18 PROCEDURE — 700102 HCHG RX REV CODE 250 W/ 637 OVERRIDE(OP): Performed by: NURSE PRACTITIONER

## 2024-06-18 RX ORDER — LACTULOSE 20 G/30ML
30 SOLUTION ORAL 3 TIMES DAILY
Status: DISCONTINUED | OUTPATIENT
Start: 2024-06-18 | End: 2024-06-18

## 2024-06-18 RX ADMIN — ENOXAPARIN SODIUM 40 MG: 100 INJECTION SUBCUTANEOUS at 16:13

## 2024-06-18 RX ADMIN — GABAPENTIN 200 MG: 100 CAPSULE ORAL at 04:40

## 2024-06-18 RX ADMIN — OXYCODONE HYDROCHLORIDE 10 MG: 10 TABLET ORAL at 16:13

## 2024-06-18 RX ADMIN — DOCUSATE SODIUM 100 MG: 100 CAPSULE, LIQUID FILLED ORAL at 16:13

## 2024-06-18 RX ADMIN — DOCUSATE SODIUM 100 MG: 100 CAPSULE, LIQUID FILLED ORAL at 04:40

## 2024-06-18 RX ADMIN — OXYCODONE HYDROCHLORIDE 10 MG: 10 TABLET ORAL at 00:26

## 2024-06-18 RX ADMIN — SENNOSIDES AND DOCUSATE SODIUM 1 TABLET: 50; 8.6 TABLET ORAL at 21:14

## 2024-06-18 RX ADMIN — ENOXAPARIN SODIUM 40 MG: 100 INJECTION SUBCUTANEOUS at 04:40

## 2024-06-18 RX ADMIN — OXYCODONE HYDROCHLORIDE 10 MG: 10 TABLET ORAL at 21:14

## 2024-06-18 RX ADMIN — BISACODYL 10 MG: 10 SUPPOSITORY RECTAL at 04:40

## 2024-06-18 RX ADMIN — DULOXETINE HYDROCHLORIDE 30 MG: 30 CAPSULE, DELAYED RELEASE ORAL at 04:40

## 2024-06-18 RX ADMIN — OXYCODONE HYDROCHLORIDE 10 MG: 10 TABLET ORAL at 04:40

## 2024-06-18 RX ADMIN — METAXALONE 800 MG: 800 TABLET ORAL at 04:40

## 2024-06-18 RX ADMIN — OXYCODONE HYDROCHLORIDE 10 MG: 10 TABLET ORAL at 10:18

## 2024-06-18 RX ADMIN — METAXALONE 800 MG: 800 TABLET ORAL at 16:13

## 2024-06-18 RX ADMIN — METAXALONE 800 MG: 800 TABLET ORAL at 13:14

## 2024-06-18 RX ADMIN — POLYETHYLENE GLYCOL 3350 1 PACKET: 17 POWDER, FOR SOLUTION ORAL at 04:40

## 2024-06-18 RX ADMIN — GABAPENTIN 200 MG: 100 CAPSULE ORAL at 21:14

## 2024-06-18 RX ADMIN — GABAPENTIN 200 MG: 100 CAPSULE ORAL at 13:14

## 2024-06-18 ASSESSMENT — ENCOUNTER SYMPTOMS
ABDOMINAL PAIN: 0
VOMITING: 0
NAUSEA: 0

## 2024-06-18 ASSESSMENT — PAIN DESCRIPTION - PAIN TYPE
TYPE: ACUTE PAIN

## 2024-06-18 NOTE — CARE PLAN
The patient is Stable - Low risk of patient condition declining or worsening    Shift Goals  Clinical Goals: bm, mobility, pain  Patient Goals: bm, pain  Family Goals: na    Progress made toward(s) clinical / shift goals:    Problem: Skin Integrity  Goal: Skin integrity is maintained or improved  Outcome: Progressing     Problem: Communication  Goal: The ability to communicate needs accurately and effectively will improve  Outcome: Progressing     Problem: Wound/ / Incision Healing  Goal: Patient's wound/surgical incision will decrease in size and heals properly  Outcome: Progressing  Note: Dressings to extremities CDI.      Problem: Pain - Standard  Goal: Alleviation of pain or a reduction in pain to the patient’s comfort goal  Outcome: Progressing  Note: Patient educated on 0-10 pain scale, available pain medications, and non-pharmacological methods of pain management. Verbalizes understanding. See MAR.          Patient is not progressing towards the following goals:      Problem: Bowel Elimination  Goal: Establish and maintain regular bowel function  Outcome: Not Progressing  Note: Patient reports passing gas. Normoactive bowel sounds. Last BM 6/11. Patient agreed to suppository in AM.

## 2024-06-18 NOTE — DISCHARGE PLANNING
Case Management Discharge Planning    Admission Date: 5/23/2024  GMLOS: 5.6  ALOS: 26    6-Clicks ADL Score: 19  6-Clicks Mobility Score: 12  PT and/or OT Eval ordered: Yes  Post-acute Referrals Ordered: Yes  Post-acute Choice Obtained: No  Has referral(s) been sent to post-acute provider:  Yes    Anticipated Discharge Dispo: Discharge Disposition: D/T to SNF with Medicare cert in anticipation of skilled care (03)    DME Needed: Yes    DME Ordered: Yes    Action(s) Taken: LMSW received call from leadership, pt has been declined for complex CM and gave recommendations for potential DC to respite home with  services. Pt was discussed during trauma rounds, pt is non-weight bearing for two more weeks. 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 LMSW notified leadership. LMSW requested quant from provider in case pt is a candidate for respite home.    Escalations Completed: None    Medically Clear: Yes    Next Steps: LMSW to follow for any additional CM needs.    Barriers to Discharge: Pending Placement    Is the patient up for discharge tomorrow: No

## 2024-06-18 NOTE — DISCHARGE PLANNING
Complex Case Management    Order received for Complex Case Management. Patient's chart has been reviewed.     Complex case management following? No    No: Recommendations to consider possible respite home d/c with home health. Case will not be assigned but CCM team will continue to be available to advise on d/c planning. Consult will be cancelled. Please re-consult as needed.    NOTE: There may be cases that are NOT assigned to a CCM but will be followed for progression of care by leaders of the Complex Discharge Committee.
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
## 2024-06-18 NOTE — PROGRESS NOTES
Virtual Nurse rounding complete.    Round Needs: Pain Rating 9/10 pain to left foot, requesting pain medication.  and Notified of Patient Needs: Primary RN.

## 2024-06-19 ENCOUNTER — APPOINTMENT (OUTPATIENT)
Dept: RADIOLOGY | Facility: MEDICAL CENTER | Age: 34
DRG: 464 | End: 2024-06-19
Attending: EMERGENCY MEDICAL TECHNICIAN, INTERMEDIATE
Payer: OTHER MISCELLANEOUS

## 2024-06-19 PROBLEM — S22.060A COMPRESSION FRACTURE OF T8 VERTEBRA (HCC): Status: RESOLVED | Noted: 2024-05-23 | Resolved: 2024-06-19

## 2024-06-19 LAB
GAMMA INTERFERON BACKGROUND BLD IA-ACNC: 0.04 IU/ML
GAMMA INTERFERON BACKGROUND BLD IA-ACNC: 0.04 IU/ML
M TB IFN-G BLD-IMP: NEGATIVE
M TB IFN-G BLD-IMP: NEGATIVE
M TB IFN-G CD4+ BCKGRND COR BLD-ACNC: 0.02 IU/ML
M TB IFN-G CD4+ BCKGRND COR BLD-ACNC: 0.02 IU/ML
MITOGEN IGNF BCKGRD COR BLD-ACNC: >10 IU/ML
MITOGEN IGNF BCKGRD COR BLD-ACNC: >10 IU/ML
QFT TB2 - NIL TBQ2: 0.01 IU/ML
QFT TB2 - NIL TBQ2: 0.01 IU/ML

## 2024-06-19 PROCEDURE — 73620 X-RAY EXAM OF FOOT: CPT | Mod: LT

## 2024-06-19 PROCEDURE — 97535 SELF CARE MNGMENT TRAINING: CPT

## 2024-06-19 PROCEDURE — 73560 X-RAY EXAM OF KNEE 1 OR 2: CPT | Mod: RT

## 2024-06-19 PROCEDURE — 73130 X-RAY EXAM OF HAND: CPT | Mod: LT

## 2024-06-19 PROCEDURE — 700111 HCHG RX REV CODE 636 W/ 250 OVERRIDE (IP): Mod: JZ | Performed by: PHYSICIAN ASSISTANT

## 2024-06-19 PROCEDURE — A9270 NON-COVERED ITEM OR SERVICE: HCPCS | Performed by: NURSE PRACTITIONER

## 2024-06-19 PROCEDURE — A9270 NON-COVERED ITEM OR SERVICE: HCPCS

## 2024-06-19 PROCEDURE — 700102 HCHG RX REV CODE 250 W/ 637 OVERRIDE(OP)

## 2024-06-19 PROCEDURE — 73620 X-RAY EXAM OF FOOT: CPT | Mod: RT

## 2024-06-19 PROCEDURE — 770001 HCHG ROOM/CARE - MED/SURG/GYN PRIV*

## 2024-06-19 PROCEDURE — 700102 HCHG RX REV CODE 250 W/ 637 OVERRIDE(OP): Performed by: NURSE PRACTITIONER

## 2024-06-19 PROCEDURE — 99232 SBSQ HOSP IP/OBS MODERATE 35: CPT | Performed by: NURSE PRACTITIONER

## 2024-06-19 RX ADMIN — POLYETHYLENE GLYCOL 3350 1 PACKET: 17 POWDER, FOR SOLUTION ORAL at 04:41

## 2024-06-19 RX ADMIN — OXYCODONE HYDROCHLORIDE 10 MG: 10 TABLET ORAL at 12:14

## 2024-06-19 RX ADMIN — OXYCODONE HYDROCHLORIDE 10 MG: 10 TABLET ORAL at 16:44

## 2024-06-19 RX ADMIN — DULOXETINE HYDROCHLORIDE 30 MG: 30 CAPSULE, DELAYED RELEASE ORAL at 04:41

## 2024-06-19 RX ADMIN — OXYCODONE HYDROCHLORIDE 10 MG: 10 TABLET ORAL at 21:06

## 2024-06-19 RX ADMIN — GABAPENTIN 200 MG: 100 CAPSULE ORAL at 21:05

## 2024-06-19 RX ADMIN — GABAPENTIN 200 MG: 100 CAPSULE ORAL at 12:13

## 2024-06-19 RX ADMIN — ACETAMINOPHEN 1000 MG: 500 TABLET, FILM COATED ORAL at 04:41

## 2024-06-19 RX ADMIN — METAXALONE 800 MG: 800 TABLET ORAL at 16:44

## 2024-06-19 RX ADMIN — METAXALONE 800 MG: 800 TABLET ORAL at 04:41

## 2024-06-19 RX ADMIN — METAXALONE 800 MG: 800 TABLET ORAL at 12:14

## 2024-06-19 RX ADMIN — GABAPENTIN 200 MG: 100 CAPSULE ORAL at 04:41

## 2024-06-19 RX ADMIN — DOCUSATE SODIUM 100 MG: 100 CAPSULE, LIQUID FILLED ORAL at 16:44

## 2024-06-19 RX ADMIN — ENOXAPARIN SODIUM 40 MG: 100 INJECTION SUBCUTANEOUS at 04:41

## 2024-06-19 RX ADMIN — ENOXAPARIN SODIUM 40 MG: 100 INJECTION SUBCUTANEOUS at 16:44

## 2024-06-19 RX ADMIN — DOCUSATE SODIUM 100 MG: 100 CAPSULE, LIQUID FILLED ORAL at 04:41

## 2024-06-19 RX ADMIN — SENNOSIDES AND DOCUSATE SODIUM 1 TABLET: 50; 8.6 TABLET ORAL at 21:05

## 2024-06-19 RX ADMIN — OXYCODONE HYDROCHLORIDE 10 MG: 10 TABLET ORAL at 04:41

## 2024-06-19 ASSESSMENT — COGNITIVE AND FUNCTIONAL STATUS - GENERAL
DAILY ACTIVITIY SCORE: 20
SUGGESTED CMS G CODE MODIFIER DAILY ACTIVITY: CJ
DRESSING REGULAR LOWER BODY CLOTHING: A LOT
TOILETING: A LITTLE
HELP NEEDED FOR BATHING: A LITTLE

## 2024-06-19 ASSESSMENT — PAIN DESCRIPTION - PAIN TYPE
TYPE: ACUTE PAIN

## 2024-06-19 ASSESSMENT — ENCOUNTER SYMPTOMS
FEVER: 0
CHILLS: 0
HEADACHES: 0
DOUBLE VISION: 0
NAUSEA: 0
SPEECH CHANGE: 0
SHORTNESS OF BREATH: 0
BLURRED VISION: 0
ABDOMINAL PAIN: 0
VOMITING: 0

## 2024-06-19 NOTE — CARE PLAN
The patient is Stable - Low risk of patient condition declining or worsening    Shift Goals  Clinical Goals: pain, mobility  Patient Goals: comfort  Family Goals: na    Progress made toward(s) clinical / shift goals:    Problem: Skin Integrity  Goal: Skin integrity is maintained or improved  Outcome: Progressing  Note: Patient able to turn from side to side independently. Up in chair for meals. Extremities elevated on pillows.      Problem: Bowel Elimination  Goal: Establish and maintain regular bowel function  Outcome: Progressing  Note: Patient last BM 6/18, large. Hydration encouraged to maintain daily bowel elimination.      Problem: Wound/ / Incision Healing  Goal: Patient's wound/surgical incision will decrease in size and heals properly  Outcome: Progressing  Note: Dressings to surgical incisions clean, dry, and intact.      Problem: Pain - Standard  Goal: Alleviation of pain or a reduction in pain to the patient’s comfort goal  Outcome: Progressing  Note: Patient educated on 0-10 pain scale, available pain medications, and non-pharmacological methods of pain management. Verbalizes understanding. See MAR.          Patient is not progressing towards the following goals:
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
## 2024-06-19 NOTE — PROGRESS NOTES
"    Orthopedic PA Progress Note    Interval changes:  Patient doing well. Complex discharge; awaiting placement   LLE splint and dressings are CDI  NWB BLE  - hinged knee brace locked at 20 degrees to right knee, right foot in boot, left foot in splint  - PFWB LUE  Will reassess L ankle wound later this week closer to 2 week postop date   Ok for d/c planning from ortho POV    ROS - Patient denies any new issues.  Denies any numbness or tingling. Pain well controlled.    /84   Pulse 68   Temp 36.7 °C (98 °F) (Temporal)   Resp 18   Ht 1.753 m (5' 9.02\")   Wt 77.1 kg (170 lb)   SpO2 95%     Patient seen and examined  No acute distress  Breathing non labored  RRR  LUE: Splint intact. Flexes and extends all fingers. Sensation intact. Cap refill <2s.   LLE: Splint and dressings CDI. Flexes and extends all toes. Sensation intact . Cap refill <2s.  RLE: knee brace and boot in place. Patient clearly fires tibialis anterior, EHL, and gastrocnemius/soleus. Sensation is intact to light touch throughout superficial peroneal, deep peroneal, tibial, saphenous, and sural nerve distributions. Strong and palpable 2+ dorsalis pedis and posterior tibial pulses with capillary refill less than 2 seconds.  Recent Labs     06/17/24  0546   WBC 5.8   RBC 4.33*   HEMOGLOBIN 12.8*   HEMATOCRIT 37.8*   MCV 87.3   MCH 29.6   MCHC 33.9   RDW 45.2   PLATELETCT 364   MPV 9.9         Active Hospital Problems    Diagnosis     Discharge planning issues [Z75.8]      Priority: High    Multiple fractures of left foot, closed, initial encounter [S92.902A]      Priority: High    Closed fracture of right tibial plateau [S82.141A]      Priority: Medium    Fracture of rib of left side [S22.32XA]      Priority: Medium    Closed left ankle fracture [S82.892A]      Priority: Medium    Sacral fracture, closed (HCC) [S32.10XA]      Priority: Medium    Multiple lacerations [T07.XXXA]      Priority: Medium    Multiple closed fractures of metacarpal " bones [S62.309A]      Priority: Medium    Closed fracture of right scapula [S42.101A]      Priority: Medium    Closed fracture of right ankle [S82.891A]      Priority: Medium    Evaluation by psychiatric service required [Z00.8]      Priority: Low    Trauma [T14.90XA]      Priority: Low    No contraindication to deep vein thrombosis (DVT) prophylaxis [Z78.9]      Priority: Low    Compression fracture of T8 vertebra (HCC) [S22.060A]      Priority: Low       Assessment/Plan:  Patient doing well. Complex discharge; awaiting placement   LLE splint and dressings are CDI  NWB BLE  - hinged knee brace locked at 20 degrees to right knee, right foot in boot, left foot in splint  - PFWB LUE  Will reassess L ankle wound later this week closer to 2 week postop date   Ok for d/c planning from ortho POV    POD12 S/p  1.  Open treatment and internal fixation of left first metatarsal fracture.  2.  Open treatment and internal fixation of left first tarsometatarsal joint.  POD#18 S/p  Closed treatment with manipulation, left fourth and   fifth metacarpal fractures and application of short arm splint.  POD#24 S/p  1.  Open treatment and internal fixation of left bimalleolar ankle fracture.  2.  Open treatment and internal fixation of left distal tibiofibular joint   disruption.  3.  Excisional debridement of left leg laceration measuring 5x1.5 cm including   skin and subcutaneous tissue.  4.  Layered repair of intermediate complexity laceration measuring 5 cm, left   leg  Wt bearing status - NWB BLE, WBAT RUE, Forearm WB LUE  - hinged knee brace locked at 20 degrees to right knee, right foot in boot, left foot in splint  PFWB LUE  Wound care/Drains - Dressings to be left in place  Future Procedures - none pending  Lovenox: Start 5/24  Sutures/Staples out- 14-21 days post operatively. Removal will completed by ortho GENIA's unless transferred.  DVT Prophylaxis outpatient: ASA 81 mg PO BID x4 weeks  PT/OT-initiated  Antibiotics:   Perioperative completed  DVT Prophylaxis- TEDS/SCDs/Foot pumps  Campos-not needed per ortho  Case Coordination for Discharge Planning - Disposition per therapy recs.

## 2024-06-19 NOTE — THERAPY
Occupational Therapy  Daily Treatment     Patient Name: Hayden Manzanares II  Age:  34 y.o., Sex:  male  Medical Record #: [id number]  Today's Date: 6/19/2024     Precautions: Fall Risk, Non Weight Bearing Right Lower Extremity, Non Weight Bearing Left Lower Extremity, CAM Boot, Immobilizer Right Lower Extremity, Platform Weight Bearing Left Upper Extremity, Weight Bearing As Tolerated Right Upper Extremity  Comments: hinged knee brace 20 set at 20 degrees; R CAM boot; L foot splint    Assessment    Pt seen for OT tx. Pt demo progress towards OT goals, but is currently limited by decreased activity tolerance, generalized weakness, and WB precautions. Pt demo improvements in RUE ROM and pain levels as he was able to appropriately place and remove SB without assist from therapist. He required CGA to complete various txfs during session. He completed seated bath on drop arm commode with min A and UB dressing with supv. He requires max A to manage CAM boot due to difficulty reaching down towards his toes. Pt is near functional plateau from OT standpoint within current restrictions. Will continue to follow for ongoing acute OT services.     Plan    Treatment Plan Status: Modify Current Treatment Plan  Type of Treatment: Self Care / Activities of Daily Living, Adaptive Equipment, Neuro Re-Education / Balance, Therapeutic Exercises, Therapeutic Activity, Family / Caregiver Training  Treatment Frequency: 2 Times per Week  Treatment Duration: Until Therapy Goals Met    DC Equipment Recommendations: Bed Side Commode (Drop arm commode)  Discharge Recommendations: Recommend outpatient occupational therapy services to address higher level deficits (Once pt cleared by orthopedics for UB ROM/strengthening, unrestricted functional use)     Objective      Vitals   O2 Delivery Device None - Room Air   Pain 0 - 10 Group   Therapist Pain Assessment Post Activity Pain Same as Prior to Activity;Nurse Notified  (Not rated, agreeable to activity)    Cognition    Comments Cooperative. Flat affect   Balance   Sitting Balance (Static) Good   Sitting Balance (Dynamic) Fair +   Weight Shift Sitting Fair   Skilled Intervention Verbal Cuing   Comments side board txf   Bed Mobility    Supine to Sit Supervised   Sit to Supine Supervised   Scooting Supervised   Rolling Supervised   Skilled Intervention Verbal Cuing   Comments Flat HOB, comes into long sitting position   Activities of Daily Living   Eating Independent   Grooming Contact Guard Assist;Seated   Bathing Minimal Assist  (Seated shower on 3:1 commode)   Upper Body Dressing Supervision  (Don gown)   Lower Body Dressing Maximal Assist   Toileting   (NT; declined need during session)   Skilled Intervention Verbal Cuing;Tactile Cuing;Compensatory Strategies   How much help from another person does the patient currently need...   6 Clicks Daily Activity Score 20   Functional Mobility   Sit to Stand Unable to Participate   Bed, Chair, Wheelchair Transfer Contact Guard Assist   Tub / Shower Transfers Contact Guard Assist  (Txfs to/from drop arm commode)   Mobility EOB > WC > 3:1 BSC for shower > WC > EOB; slideboard txfs   Skilled Intervention Verbal Cuing;Tactile Cuing   Comments Demo improvements ROM to place SB to complete txfs   Activity Tolerance   Sitting in Chair 50+ min   Sitting Edge of Bed 2 min total   Patient / Family Goals   Patient / Family Goal #1 To go home   Goal #1 Outcome Progressing as expected   Short Term Goals   Short Term Goal # 1 Pt will complete ADL txfs with supv   Goal Outcome # 1 Progressing as expected   Short Term Goal # 2 Pt will complete LB dressing with supv using AE PRN   Goal Outcome # 2 Progressing as expected   Short Term Goal # 3 Pt will complete toileting ADLs with CGA   Goal Outcome # 3 Goal not met   Short Term Goal # 4 Pt will complete seated shower with min A   Goal Outcome # 4 Goal met, new goal added   Short Term Goal # 4 B Pt will complete seated shower with supv   Goal  Outcome # 4 B Progressing as expected   Short Term Goal # 5 Pt's family will be independent to manage LE orthoses   Goal Outcome # 5 Goal not met  (Family not present during session)   Education Group   Education Provided Brace Wear and Care;Transfers;Role of Occupational Therapist;Activities of Daily Living;Weight Bearing Precautions   Role of Occupational Therapist Patient Response Patient;Acceptance;Explanation;Verbal Demonstration   Brace Wear & Care Patient Response Patient;Acceptance;Explanation;Verbal Demonstration;Action Demonstration   Transfers Patient Response Patient;Acceptance;Explanation;Verbal Demonstration;Action Demonstration;Reinforcement Needed   ADL Patient Response Patient;Acceptance;Explanation;Verbal Demonstration;Action Demonstration;Reinforcement Needed   Weight Bearing Precautions Patient Response Patient;Acceptance;Explanation;Verbal Demonstration;Action Demonstration
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
## 2024-06-20 LAB
ANION GAP SERPL CALC-SCNC: 13 MMOL/L (ref 7–16)
ANION GAP SERPL CALC-SCNC: 13 MMOL/L (ref 7–16)
BUN SERPL-MCNC: 12 MG/DL (ref 8–22)
BUN SERPL-MCNC: 12 MG/DL (ref 8–22)
CALCIUM SERPL-MCNC: 9.5 MG/DL (ref 8.5–10.5)
CALCIUM SERPL-MCNC: 9.5 MG/DL (ref 8.5–10.5)
CHLORIDE SERPL-SCNC: 109 MMOL/L (ref 96–112)
CHLORIDE SERPL-SCNC: 109 MMOL/L (ref 96–112)
CO2 SERPL-SCNC: 20 MMOL/L (ref 20–33)
CO2 SERPL-SCNC: 20 MMOL/L (ref 20–33)
CREAT SERPL-MCNC: 0.81 MG/DL (ref 0.5–1.4)
CREAT SERPL-MCNC: 0.81 MG/DL (ref 0.5–1.4)
GFR SERPLBLD CREATININE-BSD FMLA CKD-EPI: 119 ML/MIN/1.73 M 2
GFR SERPLBLD CREATININE-BSD FMLA CKD-EPI: 119 ML/MIN/1.73 M 2
GLUCOSE SERPL-MCNC: 82 MG/DL (ref 65–99)
GLUCOSE SERPL-MCNC: 82 MG/DL (ref 65–99)
POTASSIUM SERPL-SCNC: 4.4 MMOL/L (ref 3.6–5.5)
POTASSIUM SERPL-SCNC: 4.4 MMOL/L (ref 3.6–5.5)
SODIUM SERPL-SCNC: 142 MMOL/L (ref 135–145)
SODIUM SERPL-SCNC: 142 MMOL/L (ref 135–145)

## 2024-06-20 PROCEDURE — 80048 BASIC METABOLIC PNL TOTAL CA: CPT

## 2024-06-20 PROCEDURE — 700102 HCHG RX REV CODE 250 W/ 637 OVERRIDE(OP)

## 2024-06-20 PROCEDURE — 700111 HCHG RX REV CODE 636 W/ 250 OVERRIDE (IP): Mod: JZ | Performed by: PHYSICIAN ASSISTANT

## 2024-06-20 PROCEDURE — A9270 NON-COVERED ITEM OR SERVICE: HCPCS

## 2024-06-20 PROCEDURE — 36415 COLL VENOUS BLD VENIPUNCTURE: CPT

## 2024-06-20 PROCEDURE — 700102 HCHG RX REV CODE 250 W/ 637 OVERRIDE(OP): Performed by: NURSE PRACTITIONER

## 2024-06-20 PROCEDURE — 99231 SBSQ HOSP IP/OBS SF/LOW 25: CPT | Performed by: NURSE PRACTITIONER

## 2024-06-20 PROCEDURE — 770001 HCHG ROOM/CARE - MED/SURG/GYN PRIV*

## 2024-06-20 PROCEDURE — A9270 NON-COVERED ITEM OR SERVICE: HCPCS | Performed by: NURSE PRACTITIONER

## 2024-06-20 RX ADMIN — OXYCODONE HYDROCHLORIDE 10 MG: 10 TABLET ORAL at 12:01

## 2024-06-20 RX ADMIN — METAXALONE 800 MG: 800 TABLET ORAL at 12:00

## 2024-06-20 RX ADMIN — ENOXAPARIN SODIUM 40 MG: 100 INJECTION SUBCUTANEOUS at 05:19

## 2024-06-20 RX ADMIN — DULOXETINE HYDROCHLORIDE 30 MG: 30 CAPSULE, DELAYED RELEASE ORAL at 05:19

## 2024-06-20 RX ADMIN — POLYETHYLENE GLYCOL 3350 1 PACKET: 17 POWDER, FOR SOLUTION ORAL at 05:23

## 2024-06-20 RX ADMIN — METAXALONE 800 MG: 800 TABLET ORAL at 05:19

## 2024-06-20 RX ADMIN — GABAPENTIN 200 MG: 100 CAPSULE ORAL at 05:19

## 2024-06-20 RX ADMIN — OXYCODONE HYDROCHLORIDE 10 MG: 10 TABLET ORAL at 21:29

## 2024-06-20 RX ADMIN — SENNOSIDES AND DOCUSATE SODIUM 1 TABLET: 50; 8.6 TABLET ORAL at 21:29

## 2024-06-20 RX ADMIN — METAXALONE 800 MG: 800 TABLET ORAL at 16:44

## 2024-06-20 RX ADMIN — ENOXAPARIN SODIUM 40 MG: 100 INJECTION SUBCUTANEOUS at 16:44

## 2024-06-20 RX ADMIN — DOCUSATE SODIUM 100 MG: 100 CAPSULE, LIQUID FILLED ORAL at 16:44

## 2024-06-20 RX ADMIN — OXYCODONE HYDROCHLORIDE 10 MG: 10 TABLET ORAL at 16:44

## 2024-06-20 RX ADMIN — DOCUSATE SODIUM 100 MG: 100 CAPSULE, LIQUID FILLED ORAL at 05:19

## 2024-06-20 RX ADMIN — GABAPENTIN 200 MG: 100 CAPSULE ORAL at 21:29

## 2024-06-20 RX ADMIN — GABAPENTIN 200 MG: 100 CAPSULE ORAL at 13:53

## 2024-06-20 ASSESSMENT — PAIN DESCRIPTION - PAIN TYPE
TYPE: ACUTE PAIN

## 2024-06-20 ASSESSMENT — ENCOUNTER SYMPTOMS
NEUROLOGICAL NEGATIVE: 1
RESPIRATORY NEGATIVE: 1
ROS GI COMMENTS: BM 6/19
MYALGIAS: 1
PSYCHIATRIC NEGATIVE: 1

## 2024-06-20 NOTE — PROGRESS NOTES
"    Orthopedic PA Progress Note    Interval changes:  Patient doing well. Complex discharge; awaiting placement   LLE splint and dressings are CDI  NWB BLE  - hinged knee brace locked at 20 degrees to right knee, right foot in boot, left foot in splint  Left hand transitioned to ulnar gutter brace to remain in place at all times  - PFWB LUE  Will leave sutures in place for 1-2 more weeks to prevent dehiscence  Ok for d/c planning from ortho POV    ROS - Patient denies any new issues.  Denies any numbness or tingling. Pain well controlled.    /85   Pulse 70   Temp 36.9 °C (98.4 °F) (Temporal)   Resp 16   Ht 1.753 m (5' 9.02\")   Wt 77.1 kg (170 lb)   SpO2 94%     Patient seen and examined  No acute distress  Breathing non labored  RRR  LUE: Brace in place. Flexes and extends all fingers. Sensation intact. Cap refill <2s.   LLE: Splint and dressings CDI. Flexes and extends all toes. Sensation intact . Cap refill <2s.  RLE: knee brace and boot in place. Patient clearly fires tibialis anterior, EHL, and gastrocnemius/soleus. Sensation is intact to light touch throughout superficial peroneal, deep peroneal, tibial, saphenous, and sural nerve distributions. Strong and palpable 2+ dorsalis pedis and posterior tibial pulses with capillary refill less than 2 seconds.            Active Hospital Problems    Diagnosis     Discharge planning issues [Z75.8]      Priority: High    Closed fracture of right tibial plateau [S82.141A]      Priority: Medium    Multiple fractures of left foot, closed, initial encounter [S92.902A]      Priority: Medium    Closed left ankle fracture [S82.892A]      Priority: Medium    Sacral fracture, closed (HCC) [S32.10XA]      Priority: Medium    Multiple closed fractures of metacarpal bones [S62.309A]      Priority: Medium    Closed fracture of right scapula [S42.101A]      Priority: Medium    Closed fracture of right ankle [S82.891A]      Priority: Medium    Evaluation by psychiatric " service required [Z00.8]      Priority: Low    Trauma [T14.90XA]      Priority: Low    No contraindication to deep vein thrombosis (DVT) prophylaxis [Z78.9]      Priority: Low    Fracture of rib of left side [S22.32XA]      Priority: Low    Multiple lacerations [T07.XXXA]      Priority: Low       Assessment/Plan:  Patient doing well. Complex discharge; awaiting placement   LLE splint and dressings are CDI  NWB BLE  - hinged knee brace locked at 20 degrees to right knee, right foot in boot, left foot in splint  Left hand transitioned to ulnar gutter brace to remain in place at all times  - PFWB LUE  Will leave sutures in place for 1-2 more weeks to prevent dehiscence  Ok for d/c planning from ortho POV    POD14 S/p  1.  Open treatment and internal fixation of left first metatarsal fracture.  2.  Open treatment and internal fixation of left first tarsometatarsal joint.  POD#20 S/p  Closed treatment with manipulation, left fourth and   fifth metacarpal fractures and application of short arm splint.  POD#26 S/p  1.  Open treatment and internal fixation of left bimalleolar ankle fracture.  2.  Open treatment and internal fixation of left distal tibiofibular joint   disruption.  3.  Excisional debridement of left leg laceration measuring 5x1.5 cm including   skin and subcutaneous tissue.  4.  Layered repair of intermediate complexity laceration measuring 5 cm, left   leg  Wt bearing status - NWB BLE, WBAT RUE, Forearm WB LUE  - hinged knee brace locked at 20 degrees to right knee, right foot in boot, left foot in splint  PFWB LUE  Wound care/Drains - Dressings to be left in place  Future Procedures - none pending  Lovenox: Start 5/24  Sutures/Staples out- 21-28 days post operatively. Removal will completed by ortho GENIA's unless transferred.  DVT Prophylaxis outpatient: ASA 81 mg PO BID x4 weeks  PT/OT-initiated  Antibiotics:  Perioperative completed  DVT Prophylaxis- TEDS/SCDs/Foot pumps  Campos-not needed per ortho  Case  Coordination for Discharge Planning - Disposition per therapy recs.
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
## 2024-06-20 NOTE — CARE PLAN
The patient is Stable - Low risk of patient condition declining or worsening    Shift Goals  Clinical Goals: safety, pain control  Patient Goals: rest, comfort  Family Goals: no family present    Progress made toward(s) clinical / shift goals:    Problem: Pain - Standard  Goal: Alleviation of pain or a reduction in pain to the patient’s comfort goal  Outcome: Progressing    Administer pain medication per MAR.       Patient is not progressing towards the following goals:

## 2024-06-20 NOTE — CARE PLAN
The patient is Stable - Low risk of patient condition declining or worsening    Shift Goals  Clinical Goals: pain management, safe transfers  Patient Goals: comfort  Family Goals: silvia    Progress made toward(s) clinical / shift goals:    Problem: Skin Integrity  Goal: Skin integrity is maintained or improved  Outcome: Progressing  Note: Pt will continue to turn on his own to promote skin integrity.     Problem: Pain - Standard  Goal: Alleviation of pain or a reduction in pain to the patient’s comfort goal  Outcome: Progressing  Note: Pt pain level will be monitored and controlled with prn medication.       Patient is not progressing towards the following goals:

## 2024-06-21 PROBLEM — R00.0 TACHYCARDIA: Status: ACTIVE | Noted: 2024-06-21

## 2024-06-21 PROBLEM — R00.0 TACHYCARDIA: Status: RESOLVED | Noted: 2024-06-21 | Resolved: 2024-06-21

## 2024-06-21 LAB
ANION GAP SERPL CALC-SCNC: 15 MMOL/L (ref 7–16)
ANION GAP SERPL CALC-SCNC: 15 MMOL/L (ref 7–16)
BASOPHILS # BLD AUTO: 0.4 % (ref 0–1.8)
BASOPHILS # BLD AUTO: 0.4 % (ref 0–1.8)
BASOPHILS # BLD: 0.04 K/UL (ref 0–0.12)
BASOPHILS # BLD: 0.04 K/UL (ref 0–0.12)
BUN SERPL-MCNC: 14 MG/DL (ref 8–22)
BUN SERPL-MCNC: 14 MG/DL (ref 8–22)
CALCIUM SERPL-MCNC: 9.1 MG/DL (ref 8.5–10.5)
CALCIUM SERPL-MCNC: 9.1 MG/DL (ref 8.5–10.5)
CHLORIDE SERPL-SCNC: 105 MMOL/L (ref 96–112)
CHLORIDE SERPL-SCNC: 105 MMOL/L (ref 96–112)
CO2 SERPL-SCNC: 21 MMOL/L (ref 20–33)
CO2 SERPL-SCNC: 21 MMOL/L (ref 20–33)
CREAT SERPL-MCNC: 0.94 MG/DL (ref 0.5–1.4)
CREAT SERPL-MCNC: 0.94 MG/DL (ref 0.5–1.4)
EKG IMPRESSION: NORMAL
EKG IMPRESSION: NORMAL
EOSINOPHIL # BLD AUTO: 0.26 K/UL (ref 0–0.51)
EOSINOPHIL # BLD AUTO: 0.26 K/UL (ref 0–0.51)
EOSINOPHIL NFR BLD: 2.8 % (ref 0–6.9)
EOSINOPHIL NFR BLD: 2.8 % (ref 0–6.9)
ERYTHROCYTE [DISTWIDTH] IN BLOOD BY AUTOMATED COUNT: 45.9 FL (ref 35.9–50)
ERYTHROCYTE [DISTWIDTH] IN BLOOD BY AUTOMATED COUNT: 45.9 FL (ref 35.9–50)
GFR SERPLBLD CREATININE-BSD FMLA CKD-EPI: 110 ML/MIN/1.73 M 2
GFR SERPLBLD CREATININE-BSD FMLA CKD-EPI: 110 ML/MIN/1.73 M 2
GLUCOSE BLD STRIP.AUTO-MCNC: 152 MG/DL (ref 65–99)
GLUCOSE BLD STRIP.AUTO-MCNC: 152 MG/DL (ref 65–99)
GLUCOSE SERPL-MCNC: 134 MG/DL (ref 65–99)
GLUCOSE SERPL-MCNC: 134 MG/DL (ref 65–99)
HCT VFR BLD AUTO: 41.9 % (ref 42–52)
HCT VFR BLD AUTO: 41.9 % (ref 42–52)
HGB BLD-MCNC: 14 G/DL (ref 14–18)
HGB BLD-MCNC: 14 G/DL (ref 14–18)
IMM GRANULOCYTES # BLD AUTO: 0.07 K/UL (ref 0–0.11)
IMM GRANULOCYTES # BLD AUTO: 0.07 K/UL (ref 0–0.11)
IMM GRANULOCYTES NFR BLD AUTO: 0.7 % (ref 0–0.9)
IMM GRANULOCYTES NFR BLD AUTO: 0.7 % (ref 0–0.9)
LYMPHOCYTES # BLD AUTO: 3.86 K/UL (ref 1–4.8)
LYMPHOCYTES # BLD AUTO: 3.86 K/UL (ref 1–4.8)
LYMPHOCYTES NFR BLD: 41 % (ref 22–41)
LYMPHOCYTES NFR BLD: 41 % (ref 22–41)
MAGNESIUM SERPL-MCNC: 2.2 MG/DL (ref 1.5–2.5)
MAGNESIUM SERPL-MCNC: 2.2 MG/DL (ref 1.5–2.5)
MCH RBC QN AUTO: 29.5 PG (ref 27–33)
MCH RBC QN AUTO: 29.5 PG (ref 27–33)
MCHC RBC AUTO-ENTMCNC: 33.4 G/DL (ref 32.3–36.5)
MCHC RBC AUTO-ENTMCNC: 33.4 G/DL (ref 32.3–36.5)
MCV RBC AUTO: 88.2 FL (ref 81.4–97.8)
MCV RBC AUTO: 88.2 FL (ref 81.4–97.8)
MONOCYTES # BLD AUTO: 0.88 K/UL (ref 0–0.85)
MONOCYTES # BLD AUTO: 0.88 K/UL (ref 0–0.85)
MONOCYTES NFR BLD AUTO: 9.4 % (ref 0–13.4)
MONOCYTES NFR BLD AUTO: 9.4 % (ref 0–13.4)
NEUTROPHILS # BLD AUTO: 4.3 K/UL (ref 1.82–7.42)
NEUTROPHILS # BLD AUTO: 4.3 K/UL (ref 1.82–7.42)
NEUTROPHILS NFR BLD: 45.7 % (ref 44–72)
NEUTROPHILS NFR BLD: 45.7 % (ref 44–72)
NRBC # BLD AUTO: 0 K/UL
NRBC # BLD AUTO: 0 K/UL
NRBC BLD-RTO: 0 /100 WBC (ref 0–0.2)
NRBC BLD-RTO: 0 /100 WBC (ref 0–0.2)
PLATELET # BLD AUTO: 357 K/UL (ref 164–446)
PLATELET # BLD AUTO: 357 K/UL (ref 164–446)
PMV BLD AUTO: 10.1 FL (ref 9–12.9)
PMV BLD AUTO: 10.1 FL (ref 9–12.9)
POTASSIUM SERPL-SCNC: 3.9 MMOL/L (ref 3.6–5.5)
POTASSIUM SERPL-SCNC: 3.9 MMOL/L (ref 3.6–5.5)
RBC # BLD AUTO: 4.75 M/UL (ref 4.7–6.1)
RBC # BLD AUTO: 4.75 M/UL (ref 4.7–6.1)
SODIUM SERPL-SCNC: 141 MMOL/L (ref 135–145)
SODIUM SERPL-SCNC: 141 MMOL/L (ref 135–145)
WBC # BLD AUTO: 9.4 K/UL (ref 4.8–10.8)
WBC # BLD AUTO: 9.4 K/UL (ref 4.8–10.8)

## 2024-06-21 PROCEDURE — 85025 COMPLETE CBC W/AUTO DIFF WBC: CPT

## 2024-06-21 PROCEDURE — 770001 HCHG ROOM/CARE - MED/SURG/GYN PRIV*

## 2024-06-21 PROCEDURE — 80307 DRUG TEST PRSMV CHEM ANLYZR: CPT

## 2024-06-21 PROCEDURE — 700102 HCHG RX REV CODE 250 W/ 637 OVERRIDE(OP): Performed by: NURSE PRACTITIONER

## 2024-06-21 PROCEDURE — 700102 HCHG RX REV CODE 250 W/ 637 OVERRIDE(OP)

## 2024-06-21 PROCEDURE — 93005 ELECTROCARDIOGRAM TRACING: CPT | Performed by: SURGERY

## 2024-06-21 PROCEDURE — G0480 DRUG TEST DEF 1-7 CLASSES: HCPCS

## 2024-06-21 PROCEDURE — 700101 HCHG RX REV CODE 250: Performed by: PHYSICIAN ASSISTANT

## 2024-06-21 PROCEDURE — 83735 ASSAY OF MAGNESIUM: CPT

## 2024-06-21 PROCEDURE — 36415 COLL VENOUS BLD VENIPUNCTURE: CPT

## 2024-06-21 PROCEDURE — 80048 BASIC METABOLIC PNL TOTAL CA: CPT

## 2024-06-21 PROCEDURE — A9270 NON-COVERED ITEM OR SERVICE: HCPCS

## 2024-06-21 PROCEDURE — 700111 HCHG RX REV CODE 636 W/ 250 OVERRIDE (IP): Mod: JZ | Performed by: PHYSICIAN ASSISTANT

## 2024-06-21 PROCEDURE — 82962 GLUCOSE BLOOD TEST: CPT

## 2024-06-21 PROCEDURE — A9270 NON-COVERED ITEM OR SERVICE: HCPCS | Performed by: NURSE PRACTITIONER

## 2024-06-21 PROCEDURE — 93010 ELECTROCARDIOGRAM REPORT: CPT | Performed by: INTERNAL MEDICINE

## 2024-06-21 PROCEDURE — 700105 HCHG RX REV CODE 258: Performed by: NURSE PRACTITIONER

## 2024-06-21 PROCEDURE — 99232 SBSQ HOSP IP/OBS MODERATE 35: CPT

## 2024-06-21 RX ORDER — SODIUM CHLORIDE, SODIUM LACTATE, POTASSIUM CHLORIDE, CALCIUM CHLORIDE 600; 310; 30; 20 MG/100ML; MG/100ML; MG/100ML; MG/100ML
INJECTION, SOLUTION INTRAVENOUS CONTINUOUS
Status: DISCONTINUED | OUTPATIENT
Start: 2024-06-21 | End: 2024-06-23

## 2024-06-21 RX ADMIN — DOCUSATE SODIUM 100 MG: 100 CAPSULE, LIQUID FILLED ORAL at 05:24

## 2024-06-21 RX ADMIN — OXYCODONE HYDROCHLORIDE 10 MG: 10 TABLET ORAL at 23:38

## 2024-06-21 RX ADMIN — DOCUSATE SODIUM 100 MG: 100 CAPSULE, LIQUID FILLED ORAL at 18:15

## 2024-06-21 RX ADMIN — ENOXAPARIN SODIUM 40 MG: 100 INJECTION SUBCUTANEOUS at 05:25

## 2024-06-21 RX ADMIN — METAXALONE 800 MG: 800 TABLET ORAL at 18:15

## 2024-06-21 RX ADMIN — SODIUM CHLORIDE, POTASSIUM CHLORIDE, SODIUM LACTATE AND CALCIUM CHLORIDE: 600; 310; 30; 20 INJECTION, SOLUTION INTRAVENOUS at 23:42

## 2024-06-21 RX ADMIN — LIDOCAINE 1 PATCH: 4 PATCH TOPICAL at 13:07

## 2024-06-21 RX ADMIN — SODIUM CHLORIDE, POTASSIUM CHLORIDE, SODIUM LACTATE AND CALCIUM CHLORIDE: 600; 310; 30; 20 INJECTION, SOLUTION INTRAVENOUS at 02:57

## 2024-06-21 RX ADMIN — ENOXAPARIN SODIUM 40 MG: 100 INJECTION SUBCUTANEOUS at 18:15

## 2024-06-21 RX ADMIN — METAXALONE 800 MG: 800 TABLET ORAL at 13:07

## 2024-06-21 RX ADMIN — SODIUM CHLORIDE, POTASSIUM CHLORIDE, SODIUM LACTATE AND CALCIUM CHLORIDE: 600; 310; 30; 20 INJECTION, SOLUTION INTRAVENOUS at 13:14

## 2024-06-21 RX ADMIN — OXYCODONE HYDROCHLORIDE 10 MG: 10 TABLET ORAL at 13:09

## 2024-06-21 RX ADMIN — GABAPENTIN 200 MG: 100 CAPSULE ORAL at 20:19

## 2024-06-21 RX ADMIN — GABAPENTIN 200 MG: 100 CAPSULE ORAL at 05:24

## 2024-06-21 RX ADMIN — DULOXETINE HYDROCHLORIDE 30 MG: 30 CAPSULE, DELAYED RELEASE ORAL at 05:24

## 2024-06-21 RX ADMIN — GABAPENTIN 200 MG: 100 CAPSULE ORAL at 13:06

## 2024-06-21 RX ADMIN — METAXALONE 800 MG: 800 TABLET ORAL at 05:24

## 2024-06-21 RX ADMIN — OXYCODONE HYDROCHLORIDE 10 MG: 10 TABLET ORAL at 18:15

## 2024-06-21 RX ADMIN — SENNOSIDES AND DOCUSATE SODIUM 1 TABLET: 50; 8.6 TABLET ORAL at 19:28

## 2024-06-21 ASSESSMENT — PAIN DESCRIPTION - PAIN TYPE
TYPE: ACUTE PAIN
TYPE: SURGICAL PAIN

## 2024-06-21 ASSESSMENT — ENCOUNTER SYMPTOMS
GASTROINTESTINAL NEGATIVE: 1
PALPITATIONS: 0
MYALGIAS: 1
NEUROLOGICAL NEGATIVE: 1
RESPIRATORY NEGATIVE: 1
CONSTITUTIONAL NEGATIVE: 1

## 2024-06-21 ASSESSMENT — LIFESTYLE VARIABLES: SUBSTANCE_ABUSE: 1

## 2024-06-21 ASSESSMENT — PATIENT HEALTH QUESTIONNAIRE - PHQ9
SUM OF ALL RESPONSES TO PHQ9 QUESTIONS 1 AND 2: 0
2. FEELING DOWN, DEPRESSED, IRRITABLE, OR HOPELESS: NOT AT ALL
1. LITTLE INTEREST OR PLEASURE IN DOING THINGS: NOT AT ALL

## 2024-06-21 ASSESSMENT — FIBROSIS 4 INDEX: FIB4 SCORE: 0.91
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
## 2024-06-21 NOTE — PROGRESS NOTES
"    Notified by bedside RN that the patient had a change in mental status after a visitor came to see him.    (S) Patient states he smoked something. He thinks it may have been fentanyl but was unsure.    (O)    BP (!) 150/98   Pulse (!) 162   Temp 36.7 °C (98.1 °F)   Resp 20   Ht 1.753 m (5' 9.02\")   Wt 77.1 kg (170 lb)   SpO2 97%     EKG Sinus tachycardia with PVC.    Constitutional: No acute distress.  Neuro: GCS 15.  Respiratory: Unlabored.    Cardiovascular: Sustained tachycardia, rate greater than 150.  Normotensive.  Abdomen: Nontender  Musculoskeletal: Splint to left hand wrist: Right knee immobilizer and fracture boot in place.    (A/P)    Patient will be transferred to a higher level of care.  Will give Narcan if there is a change in mental status.  His tachycardia will be monitored.  Drugs of abuse screen ordered.  IV hydration. Nocturnal ICU trauma intensivist Dr. Farooq Birch notified.   "
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
## 2024-06-21 NOTE — PROGRESS NOTES
Pt transported to ortho spine via gurney with transport.  Phone, , video game controller with patient. 2 belongings bags. Wheelchair.

## 2024-06-21 NOTE — CARE PLAN
The patient is Stable - Low risk of patient condition declining or worsening    Shift Goals  Clinical Goals: safety, pain control  Patient Goals: rest ,comfort  Family Goals: no family present    Progress made toward(s) clinical / shift goals:    Problem: Pain - Standard  Goal: Alleviation of pain or a reduction in pain to the patient’s comfort goal  Outcome: Progressing    Administer pain medications per MAR. Kept side rails up and bed to its lowest position. Instructed the  patient not to go out of bed alone.        Patient is not progressing towards the following goals:

## 2024-06-21 NOTE — PROGRESS NOTES
Rapid Response Summary     Rapid response called at 0150 for:  unresponsiveness    VS: Tachycardia  (See Vitals Flowsheet)  Additional info: Patient A&O x4 upon arrival of RRT team. HR 140s-160s  MD Paged: CARISSA Gallego  Interventions:    Imaging/Tests: EKG    Labs: CBC, BMP, Magnesium, and drug tox   Medications:  N/A   Other: N/A  Disposition: Improved with rapid response team interventions. Patient stated inhalation of illicit substance. Primary RN updated on plan of care. Patient transferred to ICU.

## 2024-06-21 NOTE — CARE PLAN
The patient is Stable - Low risk of patient condition declining or worsening    Shift Goals  Clinical Goals: safety, hemodynamic stability, pain control  Patient Goals: rest  Family Goals: no family present    Progress made toward(s) clinical / shift goals:  Bed alarm on, pt in bed close to nurse's station. Pain assessed q2h and medications per MAR.

## 2024-06-21 NOTE — PROGRESS NOTES
0200H - Patient was alert oriented X4. One female visitor came to visit the patient. Suddenly the visitor came to the nurse's station informing us that the patient is unresponsive. Got to the bedside and the patient is unresponsive. Vital signs taken and hooked to the oxygen inhalation via nasal cannula. Rapid came and assess the patient. EKG done. CARISSA Gallego came and assessed the patient.     0235 Report Given to JUAN F Durán. Patient was transferred to  for continuity of care and observation.
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
## 2024-06-21 NOTE — CARE PLAN
The patient is Watcher - Medium risk of patient condition declining or worsening    Shift Goals  Clinical Goals: safety, monitor respiratory status, hemodynamic stability  Patient Goals: rest, comfort  Family Goals: no family present      Problem: Pain - Standard  Goal: Alleviation of pain or a reduction in pain to the patient’s comfort goal  Outcome: Progressing
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
## 2024-06-22 PROCEDURE — A9270 NON-COVERED ITEM OR SERVICE: HCPCS

## 2024-06-22 PROCEDURE — 700101 HCHG RX REV CODE 250: Performed by: PHYSICIAN ASSISTANT

## 2024-06-22 PROCEDURE — 770001 HCHG ROOM/CARE - MED/SURG/GYN PRIV*

## 2024-06-22 PROCEDURE — A9270 NON-COVERED ITEM OR SERVICE: HCPCS | Performed by: NURSE PRACTITIONER

## 2024-06-22 PROCEDURE — 700111 HCHG RX REV CODE 636 W/ 250 OVERRIDE (IP): Mod: JZ | Performed by: PHYSICIAN ASSISTANT

## 2024-06-22 PROCEDURE — 700102 HCHG RX REV CODE 250 W/ 637 OVERRIDE(OP)

## 2024-06-22 PROCEDURE — 700102 HCHG RX REV CODE 250 W/ 637 OVERRIDE(OP): Performed by: NURSE PRACTITIONER

## 2024-06-22 PROCEDURE — 99232 SBSQ HOSP IP/OBS MODERATE 35: CPT | Performed by: REGISTERED NURSE

## 2024-06-22 RX ORDER — OXYCODONE HYDROCHLORIDE 5 MG/1
5 TABLET ORAL EVERY 4 HOURS PRN
Status: DISCONTINUED | OUTPATIENT
Start: 2024-06-22 | End: 2024-06-26 | Stop reason: HOSPADM

## 2024-06-22 RX ADMIN — LIDOCAINE 1 PATCH: 4 PATCH TOPICAL at 14:45

## 2024-06-22 RX ADMIN — METAXALONE 800 MG: 800 TABLET ORAL at 16:09

## 2024-06-22 RX ADMIN — OXYCODONE 5 MG: 5 TABLET ORAL at 19:57

## 2024-06-22 RX ADMIN — ENOXAPARIN SODIUM 40 MG: 100 INJECTION SUBCUTANEOUS at 04:34

## 2024-06-22 RX ADMIN — METAXALONE 800 MG: 800 TABLET ORAL at 04:34

## 2024-06-22 RX ADMIN — DULOXETINE HYDROCHLORIDE 30 MG: 30 CAPSULE, DELAYED RELEASE ORAL at 04:34

## 2024-06-22 RX ADMIN — METAXALONE 800 MG: 800 TABLET ORAL at 11:25

## 2024-06-22 RX ADMIN — OXYCODONE 5 MG: 5 TABLET ORAL at 11:27

## 2024-06-22 RX ADMIN — GABAPENTIN 200 MG: 100 CAPSULE ORAL at 14:46

## 2024-06-22 RX ADMIN — DOCUSATE SODIUM 100 MG: 100 CAPSULE, LIQUID FILLED ORAL at 04:34

## 2024-06-22 RX ADMIN — OXYCODONE 5 MG: 5 TABLET ORAL at 16:08

## 2024-06-22 RX ADMIN — GABAPENTIN 200 MG: 100 CAPSULE ORAL at 20:00

## 2024-06-22 RX ADMIN — SENNOSIDES AND DOCUSATE SODIUM 1 TABLET: 50; 8.6 TABLET ORAL at 19:57

## 2024-06-22 RX ADMIN — GABAPENTIN 200 MG: 100 CAPSULE ORAL at 04:34

## 2024-06-22 ASSESSMENT — ENCOUNTER SYMPTOMS
RESPIRATORY NEGATIVE: 1
MYALGIAS: 1
NEUROLOGICAL NEGATIVE: 1
CONSTITUTIONAL NEGATIVE: 1
GASTROINTESTINAL NEGATIVE: 1
PALPITATIONS: 0

## 2024-06-22 ASSESSMENT — PAIN DESCRIPTION - PAIN TYPE
TYPE: ACUTE PAIN;SURGICAL PAIN
TYPE: ACUTE PAIN;SURGICAL PAIN

## 2024-06-22 ASSESSMENT — PATIENT HEALTH QUESTIONNAIRE - PHQ9
1. LITTLE INTEREST OR PLEASURE IN DOING THINGS: NOT AT ALL
SUM OF ALL RESPONSES TO PHQ9 QUESTIONS 1 AND 2: 0
2. FEELING DOWN, DEPRESSED, IRRITABLE, OR HOPELESS: NOT AT ALL

## 2024-06-22 ASSESSMENT — LIFESTYLE VARIABLES: SUBSTANCE_ABUSE: 1
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
## 2024-06-22 NOTE — CARE PLAN
Problem: Skin Integrity  Goal: Skin integrity is maintained or improved  Outcome: Progressing     Problem: Communication  Goal: The ability to communicate needs accurately and effectively will improve  Outcome: Progressing     Problem: Discharge Barriers/Planning  Goal: Patient's continuum of care needs are met  Outcome: Progressing     Problem: Bowel Elimination  Goal: Establish and maintain regular bowel function  Outcome: Progressing     Problem: Wound/ / Incision Healing  Goal: Patient's wound/surgical incision will decrease in size and heals properly  Outcome: Progressing     Problem: Pain - Standard  Goal: Alleviation of pain or a reduction in pain to the patient’s comfort goal  Outcome: Progressing   The patient is Stable - Low risk of patient condition declining or worsening    Shift Goals  Clinical Goals: Safety, Comfort, Pain management  Patient Goals: Comfort  Family Goals: Not present    Progress made toward(s) clinical / shift goals:      Patient is not progressing towards the following goals:

## 2024-06-22 NOTE — CARE PLAN
The patient is Stable - Low risk of patient condition declining or worsening    Shift Goals  Clinical Goals: safety and comfort, pain control  Patient Goals: comfort  Family Goals: LANA    Progress made toward(s) clinical / shift goals:    Problem: Pain - Standard  Goal: Alleviation of pain or a reduction in pain to the patient’s comfort goal  Outcome: Progressing     Problem: Wound/ / Incision Healing  Goal: Patient's wound/surgical incision will decrease in size and heals properly  Outcome: Progressing     Patient is alert and oriented, on RA.   Fall protocol in effect. Bed in lowest position. Brakes and bed alarm on.   Maintained a clutter free environment. Skid socks on. Call light and personal belongings within reach.   Patient c/o of pain, treated per MAR. Educated on pain scale.   Patient educated on POC. Encouraged verbalize of feelings.   All questions were answered.      Patient is not progressing towards the following goals:

## 2024-06-23 LAB
APAP SERPL-MCNC: <5 UG/ML (ref 10–30)
APAP SERPL-MCNC: <5 UG/ML (ref 10–30)
BUTALBITAL SERPL-MCNC: <50 NG/ML
BUTALBITAL SERPL-MCNC: <50 NG/ML
PENTOBARB SERPL-MCNC: <50 NG/ML
PENTOBARB SERPL-MCNC: <50 NG/ML
PHENOBARB SERPL-MCNC: <50 NG/ML
PHENOBARB SERPL-MCNC: <50 NG/ML
SALICYLATES SERPL-MCNC: <1 MG/DL (ref 15–25)
SALICYLATES SERPL-MCNC: <1 MG/DL (ref 15–25)

## 2024-06-23 PROCEDURE — 700102 HCHG RX REV CODE 250 W/ 637 OVERRIDE(OP)

## 2024-06-23 PROCEDURE — A9270 NON-COVERED ITEM OR SERVICE: HCPCS

## 2024-06-23 PROCEDURE — 700102 HCHG RX REV CODE 250 W/ 637 OVERRIDE(OP): Performed by: NURSE PRACTITIONER

## 2024-06-23 PROCEDURE — 700105 HCHG RX REV CODE 258: Performed by: NURSE PRACTITIONER

## 2024-06-23 PROCEDURE — 700111 HCHG RX REV CODE 636 W/ 250 OVERRIDE (IP): Mod: JZ | Performed by: PHYSICIAN ASSISTANT

## 2024-06-23 PROCEDURE — 770001 HCHG ROOM/CARE - MED/SURG/GYN PRIV*

## 2024-06-23 PROCEDURE — 99231 SBSQ HOSP IP/OBS SF/LOW 25: CPT | Performed by: NURSE PRACTITIONER

## 2024-06-23 PROCEDURE — A9270 NON-COVERED ITEM OR SERVICE: HCPCS | Performed by: NURSE PRACTITIONER

## 2024-06-23 RX ADMIN — GABAPENTIN 200 MG: 100 CAPSULE ORAL at 15:05

## 2024-06-23 RX ADMIN — METAXALONE 800 MG: 800 TABLET ORAL at 04:14

## 2024-06-23 RX ADMIN — SODIUM CHLORIDE, POTASSIUM CHLORIDE, SODIUM LACTATE AND CALCIUM CHLORIDE: 600; 310; 30; 20 INJECTION, SOLUTION INTRAVENOUS at 04:18

## 2024-06-23 RX ADMIN — DULOXETINE HYDROCHLORIDE 30 MG: 30 CAPSULE, DELAYED RELEASE ORAL at 04:14

## 2024-06-23 RX ADMIN — ENOXAPARIN SODIUM 40 MG: 100 INJECTION SUBCUTANEOUS at 16:39

## 2024-06-23 RX ADMIN — SENNOSIDES AND DOCUSATE SODIUM 1 TABLET: 50; 8.6 TABLET ORAL at 20:04

## 2024-06-23 RX ADMIN — METAXALONE 800 MG: 800 TABLET ORAL at 12:07

## 2024-06-23 RX ADMIN — OXYCODONE 5 MG: 5 TABLET ORAL at 16:39

## 2024-06-23 RX ADMIN — OXYCODONE 5 MG: 5 TABLET ORAL at 21:19

## 2024-06-23 RX ADMIN — OXYCODONE 5 MG: 5 TABLET ORAL at 03:11

## 2024-06-23 RX ADMIN — ENOXAPARIN SODIUM 40 MG: 100 INJECTION SUBCUTANEOUS at 04:15

## 2024-06-23 RX ADMIN — GABAPENTIN 200 MG: 100 CAPSULE ORAL at 04:13

## 2024-06-23 RX ADMIN — METAXALONE 800 MG: 800 TABLET ORAL at 16:39

## 2024-06-23 RX ADMIN — DOCUSATE SODIUM 100 MG: 100 CAPSULE, LIQUID FILLED ORAL at 04:14

## 2024-06-23 RX ADMIN — GABAPENTIN 200 MG: 100 CAPSULE ORAL at 20:04

## 2024-06-23 RX ADMIN — OXYCODONE 5 MG: 5 TABLET ORAL at 08:17

## 2024-06-23 ASSESSMENT — ENCOUNTER SYMPTOMS
MYALGIAS: 1
PALPITATIONS: 0
CONSTITUTIONAL NEGATIVE: 1
RESPIRATORY NEGATIVE: 1
NEUROLOGICAL NEGATIVE: 1
GASTROINTESTINAL NEGATIVE: 1

## 2024-06-23 ASSESSMENT — PAIN DESCRIPTION - PAIN TYPE
TYPE: SURGICAL PAIN
TYPE: ACUTE PAIN;SURGICAL PAIN

## 2024-06-23 NOTE — PROGRESS NOTES
"    Orthopedic PA Progress Note    Interval changes:  Patient doing well. Complex discharge; awaiting placement   - Staples removed from anterior shin and lateral left ankle without concern  - Reinforced with steri-strips  - Mepitel placed over dried blistering  NWB BLE   - Consider WB trial in 2 weeks for RLE and in 3-4 weeks for LLE  - hinged knee brace locked at 20 degrees to right knee, right foot in boot, left foot in splint  Left hand transitioned to ulnar gutter brace to remain in place at all times  - PFWB LUE  Will leave sutures in place to left dorsal foot for 1 more week to prevent dehiscence  Ok for d/c planning from ortho POV  Ortho will follow peripherally for suture removal of left foot    ROS - Patient denies any new issues.  Denies any numbness or tingling. Pain well controlled.    /86   Pulse 71   Temp 36.6 °C (97.9 °F) (Temporal)   Resp 16   Ht 1.753 m (5' 9.02\")   Wt 72 kg (158 lb 11.7 oz)   SpO2 96%     Patient seen and examined  No acute distress  Breathing non labored  RRR  LUE: Brace in place. Flexes and extends all fingers. Sensation intact. Cap refill <2s.   LLE: Boot and dressings CDI. Minimal erythema, no purulence. Flexes and extends all toes. Sensation intact . Cap refill <2s.  RLE: knee brace and boot in place. Patient clearly fires tibialis anterior, EHL, and gastrocnemius/soleus. Sensation is intact to light touch throughout superficial peroneal, deep peroneal, tibial, saphenous, and sural nerve distributions. Strong and palpable 2+ dorsalis pedis and posterior tibial pulses with capillary refill less than 2 seconds.  Recent Labs     06/21/24  0210   WBC 9.4   RBC 4.75   HEMOGLOBIN 14.0   HEMATOCRIT 41.9*   MCV 88.2   MCH 29.5   MCHC 33.4   RDW 45.9   PLATELETCT 357   MPV 10.1           Active Hospital Problems    Diagnosis     Discharge planning issues [Z75.8]      Priority: High    Closed fracture of right tibial plateau [S82.141A]      Priority: Medium    Multiple " fractures of left foot, closed, initial encounter [S92.902A]      Priority: Medium    Closed left ankle fracture [S82.892A]      Priority: Medium    Sacral fracture, closed (HCC) [S32.10XA]      Priority: Medium    Multiple closed fractures of metacarpal bones [S62.309A]      Priority: Medium    Closed fracture of right scapula [S42.101A]      Priority: Medium    Closed fracture of right ankle [S82.891A]      Priority: Medium    Evaluation by psychiatric service required [Z00.8]      Priority: Low    Trauma [T14.90XA]      Priority: Low    No contraindication to deep vein thrombosis (DVT) prophylaxis [Z78.9]      Priority: Low    Fracture of rib of left side [S22.32XA]      Priority: Low    Multiple lacerations [T07.XXXA]      Priority: Low       Assessment/Plan:  Patient doing well. Complex discharge; awaiting placement   - Staples removed from anterior shin and lateral left ankle without concern  - Reinforced with steri-strips  - Mepitel placed over dried blistering  NWB BLE   - Consider WB trial in 2 weeks for RLE and in 3-4 weeks for LLE  - hinged knee brace locked at 20 degrees to right knee, right foot in boot, left foot in splint  Left hand transitioned to ulnar gutter brace to remain in place at all times  - PFWB LUE  Will leave sutures in place to left dorsal foot for 1 more week to prevent dehiscence  Ok for d/c planning from ortho POV    POD17 S/p  1.  Open treatment and internal fixation of left first metatarsal fracture.  2.  Open treatment and internal fixation of left first tarsometatarsal joint.  POD#23 S/p  Closed treatment with manipulation, left fourth and   fifth metacarpal fractures and application of short arm splint.  POD#29 S/p  1.  Open treatment and internal fixation of left bimalleolar ankle fracture.  2.  Open treatment and internal fixation of left distal tibiofibular joint   disruption.  3.  Excisional debridement of left leg laceration measuring 5x1.5 cm including   skin and  subcutaneous tissue.  4.  Layered repair of intermediate complexity laceration measuring 5 cm, left   leg  Wt bearing status - NWB BLE, WBAT RUE, Forearm WB LUE  - hinged knee brace locked at 20 degrees to right knee, right foot in boot, left foot in splint  PFWB LUE  Wound care/Drains - Dressings to be left in place  Future Procedures - none pending  Lovenox: Start 5/24  Sutures/Staples out- 21-28 days post operatively. Removal will completed by ortho GENIA's unless transferred.  DVT Prophylaxis outpatient: ASA 81 mg PO BID x4 weeks  PT/OT-initiated  Antibiotics:  Perioperative completed  DVT Prophylaxis- TEDS/SCDs/Foot pumps  Campos-not needed per ortho  Case Coordination for Discharge Planning - Disposition per therapy recs.
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
## 2024-06-23 NOTE — CARE PLAN
The patient is Stable - Low risk of patient condition declining or worsening    Shift Goals  Clinical Goals: pain control, safety anf comfort  Patient Goals: comfort  Family Goals: LANA    Progress made toward(s) clinical / shift goals:    Problem: Skin Integrity  Goal: Skin integrity is maintained or improved  Outcome: Progressing     Problem: Pain - Standard  Goal: Alleviation of pain or a reduction in pain to the patient’s comfort goal  Outcome: Progressing     Problem: Wound/ / Incision Healing  Goal: Patient's wound/surgical incision will decrease in size and heals properly  Outcome: Progressing     Patient is alert and oriented, on RA.   Fall protocol in effect. Bed in lowest position. Brakes and bed alarm on.   Maintained a clutter free environment. Skid socks on. Call light and personal belongings within reach.   Patient c/o of pain, treated per MAR. Educated on pain scale.   Patient educated on POC. Encouraged verbalize of feelings.   All questions were answered.      Patient is not progressing towards the following goals:

## 2024-06-24 LAB
ANION GAP SERPL CALC-SCNC: 14 MMOL/L (ref 7–16)
ANION GAP SERPL CALC-SCNC: 14 MMOL/L (ref 7–16)
BASOPHILS # BLD AUTO: 0.3 % (ref 0–1.8)
BASOPHILS # BLD AUTO: 0.3 % (ref 0–1.8)
BASOPHILS # BLD: 0.02 K/UL (ref 0–0.12)
BASOPHILS # BLD: 0.02 K/UL (ref 0–0.12)
BUN SERPL-MCNC: 9 MG/DL (ref 8–22)
BUN SERPL-MCNC: 9 MG/DL (ref 8–22)
CALCIUM SERPL-MCNC: 9.2 MG/DL (ref 8.5–10.5)
CALCIUM SERPL-MCNC: 9.2 MG/DL (ref 8.5–10.5)
CHLORIDE SERPL-SCNC: 107 MMOL/L (ref 96–112)
CHLORIDE SERPL-SCNC: 107 MMOL/L (ref 96–112)
CO2 SERPL-SCNC: 22 MMOL/L (ref 20–33)
CO2 SERPL-SCNC: 22 MMOL/L (ref 20–33)
CREAT SERPL-MCNC: 0.84 MG/DL (ref 0.5–1.4)
CREAT SERPL-MCNC: 0.84 MG/DL (ref 0.5–1.4)
EOSINOPHIL # BLD AUTO: 0.16 K/UL (ref 0–0.51)
EOSINOPHIL # BLD AUTO: 0.16 K/UL (ref 0–0.51)
EOSINOPHIL NFR BLD: 2.4 % (ref 0–6.9)
EOSINOPHIL NFR BLD: 2.4 % (ref 0–6.9)
ERYTHROCYTE [DISTWIDTH] IN BLOOD BY AUTOMATED COUNT: 45.8 FL (ref 35.9–50)
ERYTHROCYTE [DISTWIDTH] IN BLOOD BY AUTOMATED COUNT: 45.8 FL (ref 35.9–50)
GFR SERPLBLD CREATININE-BSD FMLA CKD-EPI: 118 ML/MIN/1.73 M 2
GFR SERPLBLD CREATININE-BSD FMLA CKD-EPI: 118 ML/MIN/1.73 M 2
GLUCOSE SERPL-MCNC: 96 MG/DL (ref 65–99)
GLUCOSE SERPL-MCNC: 96 MG/DL (ref 65–99)
HCT VFR BLD AUTO: 40.7 % (ref 42–52)
HCT VFR BLD AUTO: 40.7 % (ref 42–52)
HGB BLD-MCNC: 13.7 G/DL (ref 14–18)
HGB BLD-MCNC: 13.7 G/DL (ref 14–18)
IMM GRANULOCYTES # BLD AUTO: 0.03 K/UL (ref 0–0.11)
IMM GRANULOCYTES # BLD AUTO: 0.03 K/UL (ref 0–0.11)
IMM GRANULOCYTES NFR BLD AUTO: 0.5 % (ref 0–0.9)
IMM GRANULOCYTES NFR BLD AUTO: 0.5 % (ref 0–0.9)
LYMPHOCYTES # BLD AUTO: 2.14 K/UL (ref 1–4.8)
LYMPHOCYTES # BLD AUTO: 2.14 K/UL (ref 1–4.8)
LYMPHOCYTES NFR BLD: 32.6 % (ref 22–41)
LYMPHOCYTES NFR BLD: 32.6 % (ref 22–41)
MCH RBC QN AUTO: 29.4 PG (ref 27–33)
MCH RBC QN AUTO: 29.4 PG (ref 27–33)
MCHC RBC AUTO-ENTMCNC: 33.7 G/DL (ref 32.3–36.5)
MCHC RBC AUTO-ENTMCNC: 33.7 G/DL (ref 32.3–36.5)
MCV RBC AUTO: 87.3 FL (ref 81.4–97.8)
MCV RBC AUTO: 87.3 FL (ref 81.4–97.8)
MONOCYTES # BLD AUTO: 0.61 K/UL (ref 0–0.85)
MONOCYTES # BLD AUTO: 0.61 K/UL (ref 0–0.85)
MONOCYTES NFR BLD AUTO: 9.3 % (ref 0–13.4)
MONOCYTES NFR BLD AUTO: 9.3 % (ref 0–13.4)
NEUTROPHILS # BLD AUTO: 3.6 K/UL (ref 1.82–7.42)
NEUTROPHILS # BLD AUTO: 3.6 K/UL (ref 1.82–7.42)
NEUTROPHILS NFR BLD: 54.9 % (ref 44–72)
NEUTROPHILS NFR BLD: 54.9 % (ref 44–72)
NRBC # BLD AUTO: 0 K/UL
NRBC # BLD AUTO: 0 K/UL
NRBC BLD-RTO: 0 /100 WBC (ref 0–0.2)
NRBC BLD-RTO: 0 /100 WBC (ref 0–0.2)
PLATELET # BLD AUTO: 300 K/UL (ref 164–446)
PLATELET # BLD AUTO: 300 K/UL (ref 164–446)
PMV BLD AUTO: 10.1 FL (ref 9–12.9)
PMV BLD AUTO: 10.1 FL (ref 9–12.9)
POTASSIUM SERPL-SCNC: 3.7 MMOL/L (ref 3.6–5.5)
POTASSIUM SERPL-SCNC: 3.7 MMOL/L (ref 3.6–5.5)
RBC # BLD AUTO: 4.66 M/UL (ref 4.7–6.1)
RBC # BLD AUTO: 4.66 M/UL (ref 4.7–6.1)
SODIUM SERPL-SCNC: 143 MMOL/L (ref 135–145)
SODIUM SERPL-SCNC: 143 MMOL/L (ref 135–145)
WBC # BLD AUTO: 6.6 K/UL (ref 4.8–10.8)
WBC # BLD AUTO: 6.6 K/UL (ref 4.8–10.8)

## 2024-06-24 PROCEDURE — 700111 HCHG RX REV CODE 636 W/ 250 OVERRIDE (IP): Mod: JZ | Performed by: PHYSICIAN ASSISTANT

## 2024-06-24 PROCEDURE — 36415 COLL VENOUS BLD VENIPUNCTURE: CPT

## 2024-06-24 PROCEDURE — 770001 HCHG ROOM/CARE - MED/SURG/GYN PRIV*

## 2024-06-24 PROCEDURE — 80048 BASIC METABOLIC PNL TOTAL CA: CPT

## 2024-06-24 PROCEDURE — 700102 HCHG RX REV CODE 250 W/ 637 OVERRIDE(OP): Performed by: NURSE PRACTITIONER

## 2024-06-24 PROCEDURE — A9270 NON-COVERED ITEM OR SERVICE: HCPCS

## 2024-06-24 PROCEDURE — 85025 COMPLETE CBC W/AUTO DIFF WBC: CPT

## 2024-06-24 PROCEDURE — 99231 SBSQ HOSP IP/OBS SF/LOW 25: CPT | Performed by: NURSE PRACTITIONER

## 2024-06-24 PROCEDURE — A9270 NON-COVERED ITEM OR SERVICE: HCPCS | Performed by: NURSE PRACTITIONER

## 2024-06-24 PROCEDURE — 700102 HCHG RX REV CODE 250 W/ 637 OVERRIDE(OP)

## 2024-06-24 RX ADMIN — GABAPENTIN 200 MG: 100 CAPSULE ORAL at 13:06

## 2024-06-24 RX ADMIN — METAXALONE 800 MG: 800 TABLET ORAL at 04:11

## 2024-06-24 RX ADMIN — OXYCODONE 5 MG: 5 TABLET ORAL at 09:21

## 2024-06-24 RX ADMIN — OXYCODONE 5 MG: 5 TABLET ORAL at 21:31

## 2024-06-24 RX ADMIN — GABAPENTIN 200 MG: 100 CAPSULE ORAL at 04:11

## 2024-06-24 RX ADMIN — OXYCODONE 5 MG: 5 TABLET ORAL at 04:14

## 2024-06-24 RX ADMIN — OXYCODONE 5 MG: 5 TABLET ORAL at 16:03

## 2024-06-24 RX ADMIN — METAXALONE 800 MG: 800 TABLET ORAL at 17:08

## 2024-06-24 RX ADMIN — SENNOSIDES AND DOCUSATE SODIUM 1 TABLET: 50; 8.6 TABLET ORAL at 21:32

## 2024-06-24 RX ADMIN — DOCUSATE SODIUM 100 MG: 100 CAPSULE, LIQUID FILLED ORAL at 04:11

## 2024-06-24 RX ADMIN — DULOXETINE HYDROCHLORIDE 30 MG: 30 CAPSULE, DELAYED RELEASE ORAL at 04:11

## 2024-06-24 RX ADMIN — GABAPENTIN 200 MG: 100 CAPSULE ORAL at 21:32

## 2024-06-24 RX ADMIN — ENOXAPARIN SODIUM 40 MG: 100 INJECTION SUBCUTANEOUS at 04:12

## 2024-06-24 RX ADMIN — METAXALONE 800 MG: 800 TABLET ORAL at 13:06

## 2024-06-24 RX ADMIN — ENOXAPARIN SODIUM 40 MG: 100 INJECTION SUBCUTANEOUS at 16:03

## 2024-06-24 ASSESSMENT — ENCOUNTER SYMPTOMS
RESPIRATORY NEGATIVE: 1
CONSTITUTIONAL NEGATIVE: 1
PALPITATIONS: 0
GASTROINTESTINAL NEGATIVE: 1
NEUROLOGICAL NEGATIVE: 1

## 2024-06-24 ASSESSMENT — PAIN DESCRIPTION - PAIN TYPE
TYPE: ACUTE PAIN;SURGICAL PAIN

## 2024-06-24 NOTE — CARE PLAN
The patient is Stable - Low risk of patient condition declining or worsening    Shift Goals  Clinical Goals: pain control, safety and comfort  Patient Goals: comfort  Family Goals: LANA    Progress made toward(s) clinical / shift goals:    Problem: Pain - Standard  Goal: Alleviation of pain or a reduction in pain to the patient’s comfort goal  Outcome: Progressing     Problem: Mobility  Goal: Patient's capacity to carry out activities will improve  Outcome: Progressing     Patient is alert and oriented, on RA.   Fall protocol in effect. Bed in lowest position. Brakes and bed alarm on.   Maintained a clutter free environment. Skid socks on. Call light and personal belongings within reach.   Patient c/o of pain, treated per MAR. Educated on pain scale.   Patient educated on POC. Encouraged verbalize of feelings.   All questions were answered.      Patient is not progressing towards the following goals:
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
## 2024-06-24 NOTE — CARE PLAN
The patient is Stable - Low risk of patient condition declining or worsening    Shift Goals  Clinical Goals: pain control, safety. comfort  Patient Goals: pain control  Family Goals: na    Progress made toward(s) clinical / shift goals:  YES      Problem: Skin Integrity  Goal: Skin integrity is maintained or improved  Outcome: Progressing     Problem: Communication  Goal: The ability to communicate needs accurately and effectively will improve  Outcome: Progressing     Problem: Discharge Barriers/Planning  Goal: Patient's continuum of care needs are met  Outcome: Progressing     Problem: Bowel Elimination  Goal: Establish and maintain regular bowel function  Outcome: Progressing     Problem: Wound/ / Incision Healing  Goal: Patient's wound/surgical incision will decrease in size and heals properly  Outcome: Progressing     Problem: Pain - Standard  Goal: Alleviation of pain or a reduction in pain to the patient’s comfort goal  Outcome: Progressing     Problem: Respiratory  Goal: Patient will achieve/maintain optimum respiratory ventilation and gas exchange  Outcome: Progressing     Problem: Mobility  Goal: Patient's capacity to carry out activities will improve  Outcome: Progressing     Problem: Infection - Standard  Goal: Patient will remain free from infection  Outcome: Progressing

## 2024-06-24 NOTE — CARE PLAN
The patient is Stable - Low risk of patient condition declining or worsening    Shift Goals  Clinical Goals: pain management, safety  Patient Goals: rest  Family Goals: not present    Progress made toward(s) clinical / shift goals:  yes    Problem: Skin Integrity  Goal: Skin integrity is maintained or improved  Outcome: Progressing     Problem: Communication  Goal: The ability to communicate needs accurately and effectively will improve  Outcome: Progressing     Problem: Discharge Barriers/Planning  Goal: Patient's continuum of care needs are met  Outcome: Progressing     Problem: Bowel Elimination  Goal: Establish and maintain regular bowel function  Outcome: Progressing     Problem: Wound/ / Incision Healing  Goal: Patient's wound/surgical incision will decrease in size and heals properly  Outcome: Progressing     Problem: Pain - Standard  Goal: Alleviation of pain or a reduction in pain to the patient’s comfort goal  Outcome: Progressing     Problem: Respiratory  Goal: Patient will achieve/maintain optimum respiratory ventilation and gas exchange  Outcome: Progressing     Problem: Mobility  Goal: Patient's capacity to carry out activities will improve  Outcome: Progressing  Flowsheets (Taken 6/23/2024 1721)  Mobility:   Encouraged mobilization per interdisciplinary team recommendations   Provided assistive devices   Monitored for signs of activity intolerance   Provided rest periods between activities   Administered pain management to allow progressive mobilization     Problem: Infection - Standard  Goal: Patient will remain free from infection  Outcome: Progressing  Flowsheets (Taken 6/23/2024 1721)  Standard Infection Interventions:   Assessed for signs and symptoms of infection   Implemented standard precautions   Instructed patient/family on signs and symptoms of infection   Provided education on proper hand hygiene and infection prevention measures   Assessed for removal IV, central lines, intra-arterial or  urinary catheters

## 2024-06-25 PROBLEM — T07.XXXA MULTIPLE LACERATIONS: Status: RESOLVED | Noted: 2024-05-23 | Resolved: 2024-06-25

## 2024-06-25 LAB
AMPHET SERPL-MCNC: <20 NG/ML
AMPHET SERPL-MCNC: <20 NG/ML
MDA SERPL-MCNC: <20 NG/ML
MDA SERPL-MCNC: <20 NG/ML
MDEA SERPL-MCNC: <20 NG/ML
MDEA SERPL-MCNC: <20 NG/ML
MDMA SERPL-MCNC: <20 NG/ML
MDMA SERPL-MCNC: <20 NG/ML
METHAMPHET SERPL-MCNC: <20 NG/ML
METHAMPHET SERPL-MCNC: <20 NG/ML

## 2024-06-25 PROCEDURE — A9270 NON-COVERED ITEM OR SERVICE: HCPCS | Performed by: NURSE PRACTITIONER

## 2024-06-25 PROCEDURE — 99231 SBSQ HOSP IP/OBS SF/LOW 25: CPT | Performed by: NURSE PRACTITIONER

## 2024-06-25 PROCEDURE — 700102 HCHG RX REV CODE 250 W/ 637 OVERRIDE(OP): Performed by: NURSE PRACTITIONER

## 2024-06-25 PROCEDURE — A9270 NON-COVERED ITEM OR SERVICE: HCPCS

## 2024-06-25 PROCEDURE — 700102 HCHG RX REV CODE 250 W/ 637 OVERRIDE(OP)

## 2024-06-25 PROCEDURE — 770001 HCHG ROOM/CARE - MED/SURG/GYN PRIV*

## 2024-06-25 PROCEDURE — 700111 HCHG RX REV CODE 636 W/ 250 OVERRIDE (IP): Mod: JZ | Performed by: PHYSICIAN ASSISTANT

## 2024-06-25 RX ADMIN — ENOXAPARIN SODIUM 40 MG: 100 INJECTION SUBCUTANEOUS at 16:04

## 2024-06-25 RX ADMIN — DOCUSATE SODIUM 100 MG: 100 CAPSULE, LIQUID FILLED ORAL at 16:04

## 2024-06-25 RX ADMIN — ENOXAPARIN SODIUM 40 MG: 100 INJECTION SUBCUTANEOUS at 04:09

## 2024-06-25 RX ADMIN — GABAPENTIN 200 MG: 100 CAPSULE ORAL at 04:09

## 2024-06-25 RX ADMIN — GABAPENTIN 200 MG: 100 CAPSULE ORAL at 13:13

## 2024-06-25 RX ADMIN — DULOXETINE HYDROCHLORIDE 30 MG: 30 CAPSULE, DELAYED RELEASE ORAL at 04:09

## 2024-06-25 RX ADMIN — METAXALONE 800 MG: 800 TABLET ORAL at 17:03

## 2024-06-25 RX ADMIN — METAXALONE 800 MG: 800 TABLET ORAL at 04:09

## 2024-06-25 RX ADMIN — GABAPENTIN 200 MG: 100 CAPSULE ORAL at 21:20

## 2024-06-25 RX ADMIN — OXYCODONE 5 MG: 5 TABLET ORAL at 02:30

## 2024-06-25 RX ADMIN — METAXALONE 800 MG: 800 TABLET ORAL at 13:13

## 2024-06-25 RX ADMIN — SENNOSIDES AND DOCUSATE SODIUM 1 TABLET: 50; 8.6 TABLET ORAL at 21:20

## 2024-06-25 RX ADMIN — OXYCODONE 5 MG: 5 TABLET ORAL at 16:04

## 2024-06-25 RX ADMIN — DOCUSATE SODIUM 100 MG: 100 CAPSULE, LIQUID FILLED ORAL at 04:09

## 2024-06-25 ASSESSMENT — ENCOUNTER SYMPTOMS
MYALGIAS: 1
PALPITATIONS: 0
RESPIRATORY NEGATIVE: 1
CONSTITUTIONAL NEGATIVE: 1
GASTROINTESTINAL NEGATIVE: 1
NEUROLOGICAL NEGATIVE: 1

## 2024-06-25 ASSESSMENT — PAIN DESCRIPTION - PAIN TYPE
TYPE: ACUTE PAIN;SURGICAL PAIN

## 2024-06-25 NOTE — PROGRESS NOTES
Pharmacy Pharmacotherapy Consult for LOS >30 days    Admit Date: 5/23/2024      Medications were reviewed for appropriateness and ongoing need.     Current Facility-Administered Medications   Medication Dose Route Frequency Provider Last Rate Last Admin    oxyCODONE immediate-release (Roxicodone) tablet 5 mg  5 mg Oral Q4HRS PRN Margarita Mata A.P.R.N.   5 mg at 06/25/24 0230    gabapentin (Neurontin) capsule 200 mg  200 mg Oral Q8HRS Camille Abdi D.N.P.   200 mg at 06/25/24 1313    DULoxetine (Cymbalta) capsule 30 mg  30 mg Oral DAILY STEFANIA HernandezP.NWilber   30 mg at 06/25/24 0409    lidocaine (Asperflex) 4 % patch 1-3 Patch  1-3 Patch Transdermal Q24HR Komal Tolentino P.A.-C.   1 Patch at 06/22/24 1445    Respiratory Therapy Consult   Nebulization Continuous RT Margarita Mata, A.P.R.N.        ondansetron (Zofran ODT) dispertab 4 mg  4 mg Oral Q4HRS PRN Margarita Mata, A.P.R.N.        docusate sodium (Colace) capsule 100 mg  100 mg Oral BID Margarita Mata, A.P.R.N.   100 mg at 06/25/24 0409    senna-docusate (Pericolace Or Senokot S) 8.6-50 MG per tablet 1 Tablet  1 Tablet Oral Nightly Margarita Mata, A.P.R.N.   1 Tablet at 06/24/24 2132    senna-docusate (Pericolace Or Senokot S) 8.6-50 MG per tablet 1 Tablet  1 Tablet Oral Q24HRS PRN Margarita Mata, A.P.R.N.   1 Tablet at 06/16/24 1035    polyethylene glycol/lytes (Miralax) Packet 1 Packet  1 Packet Oral BID Margarita Mata, A.P.R.N.   1 Packet at 06/20/24 0523    magnesium hydroxide (Milk Of Magnesia) suspension 30 mL  30 mL Oral DAILY AT 1800 Margarita Mata, A.P.R.N.   30 mL at 06/17/24 1649    bisacodyl (Dulcolax) suppository 10 mg  10 mg Rectal Q24HRS PRN Margarita Mata, A.P.R.N.   10 mg at 06/18/24 0440    sodium phosphate (Fleet) enema 133 mL  1 Each Rectal Once PRN Margarita Mata, A.P.R.N.        acetaminophen (Tylenol) tablet 1,000 mg  1,000 mg Oral Q6HRS PRN Margarita Mata, A.P.R.N.   1,000 mg at  06/19/24 0441    metaxalone (Skelaxin) tablet 800 mg  800 mg Oral TID Margarita Mata, A.P.R.N.   800 mg at 06/25/24 1313    enoxaparin (Lovenox) inj 40 mg  40 mg Subcutaneous Q12HRS HUNTER Chacon-ÓSCAR   40 mg at 06/25/24 0409       Recommendations:  -Patient has not needed the ondansetron odt, d/c  -Pain scores 5-8, patient using oxycodone prn (about 20mg a day), last prn acetaminophen on 6/19, consider recheck CMP (last one on 5/23/2024) and schedule acetaminophen to reduce opioid prn use.   -patient also using gabapentin and duloxetine per note review, doses at lower end. Consider discontinuing one agent and optimizing dose of continued agent.       Margarita Hurd, pharmacy intern

## 2024-06-25 NOTE — CARE PLAN
The patient is Stable - Low risk of patient condition declining or worsening    Shift Goals  Clinical Goals: pain control, safety  Patient Goals: pain control, rest, comfort  Family Goals: n/a    Progress made toward(s) clinical / shift goals:  yes      Problem: Skin Integrity  Goal: Skin integrity is maintained or improved  Outcome: Progressing     Problem: Communication  Goal: The ability to communicate needs accurately and effectively will improve  Outcome: Progressing     Problem: Discharge Barriers/Planning  Goal: Patient's continuum of care needs are met  Outcome: Progressing     Problem: Bowel Elimination  Goal: Establish and maintain regular bowel function  Outcome: Progressing     Problem: Wound/ / Incision Healing  Goal: Patient's wound/surgical incision will decrease in size and heals properly  Outcome: Progressing     Problem: Pain - Standard  Goal: Alleviation of pain or a reduction in pain to the patient’s comfort goal  Outcome: Progressing     Problem: Respiratory  Goal: Patient will achieve/maintain optimum respiratory ventilation and gas exchange  Outcome: Progressing     Problem: Mobility  Goal: Patient's capacity to carry out activities will improve  Outcome: Progressing     Problem: Infection - Standard  Goal: Patient will remain free from infection  Outcome: Progressing

## 2024-06-25 NOTE — DISCHARGE PLANNING
Case Management Discharge Planning    Admission Date: 2024  GMLOS: 5.6  ALOS: 33    6-Clicks ADL Score: 20  6-Clicks Mobility Score: 12  PT and/or OT Eval ordered: Yes  Post-acute Referrals Ordered: Yes  Post-acute Choice Obtained: NA  Has referral(s) been sent to post-acute provider:  Yes      Anticipated Discharge Dispo: Discharge Disposition: D/T to SNF with Medicare cert in anticipation of skilled care (03)    DME Needed: No    Action(s) Taken: Per prior DC plan note, patient's medicaid coverage is going to  next month.  RN CM sent a message to [facility] requesting that the patient be screened for medicaid.      0931  Pt discussed in morning rounds.  Per charge RN, patient is now able to transfer self from bed to wheelchair and from wheelchair to toilet independently.  Pt still needs assistance putting braces on bilateral legs.  RN CM called patient's father, Hayden, to discuss the patient and update him on his independence with transfers.  Per patient's father, he is still unable to take him home unless he can bear weight on one of his legs.  Per his father, their home is not wheelchair accessible.  There are 3 steps to get into to the home, and there is not a ramp.  Also, patient wouldn't be able to fit the wheelchair into the bathroom to transfer. Per patient's father, everyone is their home is disabled and unable to lift the patient if necessary. Pt's father is also concerned about taking him to his follow up appointments, since is a 1400 mile drive, and his [place] medicaid is not accepted in [place].  Pt's father Hayden said he will re-visit taking him home with him once he is able to use crutches.     Escalations Completed: None    Medically Clear: Yes    Next Steps: RN CM to continue to follow for DC planning      Barriers to Discharge: Pending Placement    Is the patient up for discharge tomorrow: No
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
## 2024-06-25 NOTE — CARE PLAN
The patient is Stable - Low risk of patient condition declining or worsening    Shift Goals  Clinical Goals: pain mgmt, safety  Patient Goals: pain control, rest, comfort  Family Goals: not present    Progress made toward(s) clinical / shift goals:        Problem: Skin Integrity  Goal: Skin integrity is maintained or improved  Outcome: Progressing     Problem: Pain - Standard  Goal: Alleviation of pain or a reduction in pain to the patient’s comfort goal  Outcome: Progressing     Problem: Mobility  Goal: Patient's capacity to carry out activities will improve  Outcome: Progressing     Problem: Infection - Standard  Goal: Patient will remain free from infection  Outcome: Progressing       Patient is not progressing towards the following goals:

## 2024-06-26 VITALS
DIASTOLIC BLOOD PRESSURE: 82 MMHG | HEART RATE: 93 BPM | RESPIRATION RATE: 18 BRPM | WEIGHT: 158.73 LBS | BODY MASS INDEX: 23.51 KG/M2 | HEIGHT: 69 IN | TEMPERATURE: 97 F | OXYGEN SATURATION: 94 % | SYSTOLIC BLOOD PRESSURE: 116 MMHG

## 2024-06-26 LAB
AMPHET UR QL SCN: NEGATIVE
AMPHET UR QL SCN: NEGATIVE
BARBITURATES UR QL SCN: NEGATIVE
BARBITURATES UR QL SCN: NEGATIVE
BENZODIAZ UR QL SCN: NEGATIVE
BENZODIAZ UR QL SCN: NEGATIVE
BZE UR QL SCN: NEGATIVE
BZE UR QL SCN: NEGATIVE
CANNABINOIDS UR QL SCN: NEGATIVE
CANNABINOIDS UR QL SCN: NEGATIVE
EKG IMPRESSION: NORMAL
EKG IMPRESSION: NORMAL
FENTANYL UR QL: POSITIVE
FENTANYL UR QL: POSITIVE
METHADONE UR QL SCN: NEGATIVE
METHADONE UR QL SCN: NEGATIVE
OPIATES UR QL SCN: NEGATIVE
OPIATES UR QL SCN: NEGATIVE
OXYCODONE UR QL SCN: POSITIVE
OXYCODONE UR QL SCN: POSITIVE
PCP UR QL SCN: NEGATIVE
PCP UR QL SCN: NEGATIVE
PROPOXYPH UR QL SCN: NEGATIVE
PROPOXYPH UR QL SCN: NEGATIVE

## 2024-06-26 PROCEDURE — A9270 NON-COVERED ITEM OR SERVICE: HCPCS

## 2024-06-26 PROCEDURE — 80307 DRUG TEST PRSMV CHEM ANLYZR: CPT

## 2024-06-26 PROCEDURE — 700102 HCHG RX REV CODE 250 W/ 637 OVERRIDE(OP)

## 2024-06-26 PROCEDURE — 93005 ELECTROCARDIOGRAM TRACING: CPT | Performed by: NURSE PRACTITIONER

## 2024-06-26 PROCEDURE — 700101 HCHG RX REV CODE 250: Performed by: PHYSICIAN ASSISTANT

## 2024-06-26 PROCEDURE — 99238 HOSP IP/OBS DSCHRG MGMT 30/<: CPT | Performed by: REGISTERED NURSE

## 2024-06-26 PROCEDURE — 93010 ELECTROCARDIOGRAM REPORT: CPT | Performed by: INTERNAL MEDICINE

## 2024-06-26 RX ADMIN — LIDOCAINE 1 PATCH: 4 PATCH TOPICAL at 15:08

## 2024-06-26 RX ADMIN — GABAPENTIN 200 MG: 100 CAPSULE ORAL at 15:08

## 2024-06-26 RX ADMIN — METAXALONE 800 MG: 800 TABLET ORAL at 12:46

## 2024-06-26 ASSESSMENT — ENCOUNTER SYMPTOMS
MYALGIAS: 1
GASTROINTESTINAL NEGATIVE: 1
PALPITATIONS: 0
NEUROLOGICAL NEGATIVE: 1
RESPIRATORY NEGATIVE: 1
CONSTITUTIONAL NEGATIVE: 1

## 2024-06-26 ASSESSMENT — PAIN DESCRIPTION - PAIN TYPE
TYPE: ACUTE PAIN;SURGICAL PAIN
TYPE: ACUTE PAIN;SURGICAL PAIN
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
## 2024-06-26 NOTE — CARE PLAN
The patient is Watcher - Medium risk of patient condition declining or worsening    Shift Goals  Clinical Goals: Pain <6/10, Obtain urine for screening, safety, VSS  Patient Goals: Pain <6/10, rest  Family Goals: LANA    Progress made toward(s) clinical / shift goals:    Problem: Pain - Standard  Goal: Alleviation of pain or a reduction in pain to the patient’s comfort goal  Outcome: Progressing  Note: VSS, denies pain at this time, no s/s of distress, hourly rounding in porgress     Problem: Knowledge Deficit - Standard  Goal: Patient and family/care givers will demonstrate understanding of plan of care, disease process/condition, diagnostic tests and medications  Outcome: Progressing  Note: Educated pt on POC, monitor VS, pain control, NWB to BLE, mobility, safety, all questions answered, hourly rounding in progress       Patient is not progressing towards the following goals:

## 2024-06-26 NOTE — DISCHARGE PLANNING
Complex Case Management    Order received for Complex Case Management. Patient's chart has been reviewed.     Complex case management following? Yes    Yes: Case assigned to Josh Humphries    NOTE: There may be cases that are NOT assigned to a CCM but will be followed for progression of care by leaders of the Complex Discharge Committee.

## 2024-06-26 NOTE — PROGRESS NOTES
Hayden Manzanares II has chosen to leave the hospital against medical advice. The attending physician has not discharged the patient. Notified Sophia FERRER. The provider is aware that the patient is leaving against medical advice. Patient expresses understanding of the risks of leaving the hospital and benefits of admission including but not limited to, the availability and proximity of nurses, physicians, monitoring, diagnostic testing, treatment, and a safe discharge plan. The patient had the opportunity to ask questions about their medical condition and recommended treatment.  Patient is aware that they may return for care at any time.

## 2024-06-26 NOTE — PROGRESS NOTES
UA positive for fentanyl. No fentanyl documented as given per MAR. Suspected use during noc shift, not administered by staff. Pt moved to desk bed with telesitter with open door at all times. Pt refused to comply, requested to leave AMA. Security, nursing staff, ACT B, and ANM at bedside assisting pt with belongings. Provider notified of AMA, pt left unit with belongings via wheelchair.

## 2024-06-26 NOTE — CARE PLAN
The patient is Stable - Low risk of patient condition declining or worsening    Shift Goals  Clinical Goals: mobility, safety  Patient Goals: pain control,  Family Goals: not present    Progress made toward(s) clinical / shift goals:        Problem: Skin Integrity  Goal: Skin integrity is maintained or improved  Outcome: Progressing     Problem: Pain - Standard  Goal: Alleviation of pain or a reduction in pain to the patient’s comfort goal  Outcome: Progressing     Problem: Communication  Goal: The ability to communicate needs accurately and effectively will improve  Outcome: Progressing     Problem: Respiratory  Goal: Patient will achieve/maintain optimum respiratory ventilation and gas exchange  Outcome: Progressing     Problem: Mobility  Goal: Patient's capacity to carry out activities will improve  Outcome: Progressing     Problem: Infection - Standard  Goal: Patient will remain free from infection  Outcome: Progressing       Patient is not progressing towards the following goals:

## 2024-06-26 NOTE — PROGRESS NOTES
Report received. Assumed care. Pt in bed awake. A/O x4. VSS. Responds appropriately. Denies pain, SOB. Assessment complete. Bilateral walking boot on. Hinged brace to right leg in place, cdi. NWB BLE. Discussed POC, , pt verbalizes understanding. Explained importance of calling before getting OOB. Call light and belongings within reach. Bed in the lowest position. Treaded socks in place. Hourly rounding in progress. Will continue to monitor .

## 2024-06-27 LAB
6MAM SERPL-MCNC: <2 NG/ML
6MAM SERPL-MCNC: <2 NG/ML
CODEINE SERPL-MCNC: <2 NG/ML
CODEINE SERPL-MCNC: <2 NG/ML
HYDROCODONE SERPL-MCNC: <2 NG/ML
HYDROCODONE SERPL-MCNC: <2 NG/ML
HYDROMORPHONE SERPL-MCNC: <2 NG/ML
HYDROMORPHONE SERPL-MCNC: <2 NG/ML
MORPHINE SERPL-MCNC: <2 NG/ML
MORPHINE SERPL-MCNC: <2 NG/ML
OXYCODONE SERPL-MCNC: 4 NG/ML
OXYCODONE SERPL-MCNC: 4 NG/ML
OXYMORPHONE SERPL-MCNC: <2 NG/ML
OXYMORPHONE SERPL-MCNC: <2 NG/ML

## (undated) DEVICE — COVER LIGHT HANDLE ALC PLUS DISP (18EA/BX)

## (undated) DEVICE — BANDAGE ELASTIC STERILE MATRIX 6 X 10 (20EA/CA)

## (undated) DEVICE — DRAPE SURG STERI-DRAPE 7X11OD - (40EA/CA)

## (undated) DEVICE — SET LEADWIRE 5 LEAD BEDSIDE DISPOSABLE ECG (1SET OF 5/EA)

## (undated) DEVICE — PADDING CAST 6 IN STERILE - 6 X 4 YDS (24/CA)

## (undated) DEVICE — SUCTION INSTRUMENT YANKAUER BULBOUS TIP W/O VENT (50EA/CA)

## (undated) DEVICE — SPONGE GAUZESTER 4 X 4 4PLY - (128PK/CA)

## (undated) DEVICE — PAD LAP STERILE 18 X 18 - (5/PK 40PK/CA)

## (undated) DEVICE — GLOVE BIOGEL PI ORTHO SZ 7 PF LF (40PR/BX)

## (undated) DEVICE — WRAP COBAN SELF-ADHERENT 6 IN X 5YDS STERILE TAN (12/CA)

## (undated) DEVICE — GLOVE BIOGEL INDICATOR SZ 7.5 SURGICAL PF LTX - (50PR/BX 4BX/CA)

## (undated) DEVICE — SUTURE 2-0 VICRYL PLUS CT-1 36 (36PK/BX)"

## (undated) DEVICE — BAG SPONGE COUNT 10.25 X 32 - BLUE (250/CA)

## (undated) DEVICE — NEEDLE NON SAFETY HYPO 22 GA X 1 1/2 IN (100/BX)

## (undated) DEVICE — LACTATED RINGERS INJ 1000 ML - (14EA/CA 60CA/PF)

## (undated) DEVICE — DRESSING XEROFORM 1X8 - (50/BX 4BX/CA)

## (undated) DEVICE — SLEEVE, VASO, THIGH, MED

## (undated) DEVICE — TOWELS CLOTH SURGICAL - (4/PK 20PK/CA)

## (undated) DEVICE — DRAPE U ORTHOPEDIC - (10/BX)

## (undated) DEVICE — SUTURE ETHILON 2-0 FSLX 30 (36PK/BX)"

## (undated) DEVICE — CHLORAPREP 26 ML APPLICATOR - ORANGE TINT(25/CA)

## (undated) DEVICE — STAPLER SKIN DISP - (6/BX 10BX/CA) VISISTAT

## (undated) DEVICE — SENSOR OXIMETER ADULT SPO2 RD SET (20EA/BX)

## (undated) DEVICE — BANDAGE ELASTIC 6 HONEYCOMB - 6X5YD LF (20/CA)"

## (undated) DEVICE — DRAPE 36X28IN RAD CARM BND BG - (25/CA) O

## (undated) DEVICE — GOWN WARMING STANDARD FLEX - (30/CA)

## (undated) DEVICE — SPLINT PLASTER 5 IN X 30 IN - (50EA/BX 6BX/CA)

## (undated) DEVICE — DRESSING PETROLEUM GAUZE 5 X 9" (50EA/BX 4BX/CA)"

## (undated) DEVICE — GLOVE BIOGEL PI INDICATOR SZ 7.0 SURGICAL PF LF - (50/BX 4BX/CA)

## (undated) DEVICE — BIT DRILL L122MM OD3.5MM NONSTERILE OVER ADD ON FITTING

## (undated) DEVICE — BIT DRILL DIA2.6MM SCALED FOR VARIAX 2 WRIST FUSION LOCKING PLATE SYSTEM

## (undated) DEVICE — GLOVE SZ 6 BIOGEL PI MICRO - PF LF (50PR/BX 4BX/CA)

## (undated) DEVICE — PACK LOWER EXTREMITY - (2/CA)

## (undated) DEVICE — BIT DRILL L135MM DIA2MM SCALED ADD ON FITTING

## (undated) DEVICE — SET EXTENSION WITH 2 PORTS (48EA/CA) ***PART #2C8610 IS A SUBSTITUTE*****

## (undated) DEVICE — DRAPE C ARMOR (12EA/CA)

## (undated) DEVICE — SYRINGE 30 ML LL (56/BX)

## (undated) DEVICE — GLOVE BIOGEL PI ORTHO SZ 7.5 PF LF (40PR/BX)

## (undated) DEVICE — BLADE SURGICAL #15 - (50/BX 3BX/CA)

## (undated) DEVICE — ELECTRODE DUAL RETURN W/ CORD - (50/PK)

## (undated) DEVICE — SODIUM CHL IRRIGATION 0.9% 1000ML (12EA/CA)

## (undated) DEVICE — GLOVE BIOGEL PI INDICATOR SZ 6.5 SURGICAL PF LF - (50/BX 4BX/CA)

## (undated) DEVICE — SUTURE 3-0 ETHILON FS-1 - (36/BX) 30 INCH

## (undated) DEVICE — GUIDEWIRE ORTHOPEDIC L150MM DIA1.4MM STAINLESS STEEL THREADED FOR 4MM SCREW ASNIS III

## (undated) DEVICE — SUTURE 0 VICRYL PLUS CT-1 - 36 INCH (36/BX)

## (undated) DEVICE — CANISTER SUCTION 3000ML MECHANICAL FILTER AUTO SHUTOFF MEDI-VAC NONSTERILE LF DISP (40EA/CA)

## (undated) DEVICE — DRILL SURGICAL L122MM DIA2.7MM ADD ON FITTING VARIAX

## (undated) DEVICE — BOVIE BLADE COATED - (50/PK)

## (undated) DEVICE — TUBING CLEARLINK DUO-VENT - C-FLO (48EA/CA)

## (undated) DEVICE — WATER IRRIGATION STERILE 1000ML (12EA/CA)

## (undated) DEVICE — DRAPE LARGE 3 QUARTER - (20/CA)

## (undated) DEVICE — SUTURE GENERAL

## (undated) DEVICE — SLEEVE VASO CALF MED - (10PR/CA)